# Patient Record
Sex: MALE | Race: WHITE | NOT HISPANIC OR LATINO | Employment: OTHER | URBAN - METROPOLITAN AREA
[De-identification: names, ages, dates, MRNs, and addresses within clinical notes are randomized per-mention and may not be internally consistent; named-entity substitution may affect disease eponyms.]

---

## 2021-05-14 ENCOUNTER — IMMUNIZATION (OUTPATIENT)
Dept: FAMILY PLANNING/WOMEN'S HEALTH CLINIC | Facility: IMMUNIZATION CENTER | Age: 41
End: 2021-05-14
Payer: MEDICARE

## 2021-05-14 DIAGNOSIS — Z23 ENCOUNTER FOR VACCINATION: Primary | ICD-10-CM

## 2021-05-14 PROCEDURE — 0002A PFIZER SARS-COV-2 VACCINE: CPT

## 2021-05-14 PROCEDURE — 91300 PFIZER SARS-COV-2 VACCINE: CPT

## 2022-03-09 PROCEDURE — RXMED WILLOW AMBULATORY MEDICATION CHARGE

## 2022-03-10 ENCOUNTER — PHARMACY VISIT (OUTPATIENT)
Dept: PHARMACY | Facility: MEDICAL CENTER | Age: 42
End: 2022-03-10
Payer: COMMERCIAL

## 2022-05-18 ENCOUNTER — TELEPHONE (OUTPATIENT)
Dept: SCHEDULING | Facility: IMAGING CENTER | Age: 42
End: 2022-05-18

## 2022-05-18 PROCEDURE — RXMED WILLOW AMBULATORY MEDICATION CHARGE

## 2022-05-19 PROCEDURE — RXMED WILLOW AMBULATORY MEDICATION CHARGE: Performed by: STUDENT IN AN ORGANIZED HEALTH CARE EDUCATION/TRAINING PROGRAM

## 2022-05-20 ENCOUNTER — OFFICE VISIT (OUTPATIENT)
Dept: MEDICAL GROUP | Facility: MEDICAL CENTER | Age: 42
End: 2022-05-20
Payer: COMMERCIAL

## 2022-05-20 ENCOUNTER — HOSPITAL ENCOUNTER (OUTPATIENT)
Dept: LAB | Facility: MEDICAL CENTER | Age: 42
End: 2022-05-20
Attending: INTERNAL MEDICINE
Payer: COMMERCIAL

## 2022-05-20 VITALS
HEART RATE: 94 BPM | SYSTOLIC BLOOD PRESSURE: 122 MMHG | WEIGHT: 203.8 LBS | HEIGHT: 74 IN | TEMPERATURE: 97.3 F | OXYGEN SATURATION: 97 % | DIASTOLIC BLOOD PRESSURE: 84 MMHG | BODY MASS INDEX: 26.15 KG/M2

## 2022-05-20 DIAGNOSIS — R53.82 CHRONIC FATIGUE: ICD-10-CM

## 2022-05-20 DIAGNOSIS — G47.33 OBSTRUCTIVE SLEEP APNEA: ICD-10-CM

## 2022-05-20 DIAGNOSIS — K64.8 OTHER HEMORRHOIDS: ICD-10-CM

## 2022-05-20 DIAGNOSIS — Z12.83 SKIN CANCER SCREENING: ICD-10-CM

## 2022-05-20 DIAGNOSIS — M26.04 MANDIBULAR HYPOPLASIA: ICD-10-CM

## 2022-05-20 DIAGNOSIS — K60.2 ANAL FISSURE: ICD-10-CM

## 2022-05-20 DIAGNOSIS — G47.10 HYPERSOMNOLENCE: ICD-10-CM

## 2022-05-20 DIAGNOSIS — K58.0 IRRITABLE BOWEL SYNDROME WITH DIARRHEA: ICD-10-CM

## 2022-05-20 DIAGNOSIS — L98.9 SKIN LESION OF FACE: ICD-10-CM

## 2022-05-20 DIAGNOSIS — K62.89 ANAL PAIN: ICD-10-CM

## 2022-05-20 DIAGNOSIS — Z00.00 WELL ADULT EXAM: ICD-10-CM

## 2022-05-20 DIAGNOSIS — R79.89 LOW TESTOSTERONE: ICD-10-CM

## 2022-05-20 DIAGNOSIS — J45.40 MODERATE PERSISTENT ASTHMA WITHOUT COMPLICATION: ICD-10-CM

## 2022-05-20 DIAGNOSIS — J30.89 NON-SEASONAL ALLERGIC RHINITIS, UNSPECIFIED TRIGGER: ICD-10-CM

## 2022-05-20 DIAGNOSIS — K21.9 GASTROESOPHAGEAL REFLUX DISEASE, UNSPECIFIED WHETHER ESOPHAGITIS PRESENT: ICD-10-CM

## 2022-05-20 PROBLEM — K76.0 FATTY LIVER: Status: ACTIVE | Noted: 2018-11-07

## 2022-05-20 PROBLEM — G43.909 MIGRAINES: Status: ACTIVE | Noted: 2018-11-07

## 2022-05-20 PROBLEM — J45.909 ASTHMA: Status: ACTIVE | Noted: 2018-11-07

## 2022-05-20 PROBLEM — J34.3 HYPERTROPHY, NASAL, TURBINATE: Status: ACTIVE | Noted: 2019-07-03

## 2022-05-20 PROBLEM — R68.84 JAW PAIN: Status: ACTIVE | Noted: 2017-07-18

## 2022-05-20 PROBLEM — K64.9 HEMORRHOIDS: Status: ACTIVE | Noted: 2017-03-15

## 2022-05-20 PROBLEM — M26.02 MAXILLARY HYPOPLASIA: Status: ACTIVE | Noted: 2021-02-10

## 2022-05-20 LAB
ALBUMIN SERPL BCP-MCNC: 4.7 G/DL (ref 3.2–4.9)
ALBUMIN/GLOB SERPL: 1.6 G/DL
ALP SERPL-CCNC: 69 U/L (ref 30–99)
ALT SERPL-CCNC: 23 U/L (ref 2–50)
ANION GAP SERPL CALC-SCNC: 10 MMOL/L (ref 7–16)
AST SERPL-CCNC: 21 U/L (ref 12–45)
BASOPHILS # BLD AUTO: 0.3 % (ref 0–1.8)
BASOPHILS # BLD: 0.02 K/UL (ref 0–0.12)
BILIRUB SERPL-MCNC: 1 MG/DL (ref 0.1–1.5)
BUN SERPL-MCNC: 15 MG/DL (ref 8–22)
CALCIUM SERPL-MCNC: 9.5 MG/DL (ref 8.4–10.2)
CHLORIDE SERPL-SCNC: 104 MMOL/L (ref 96–112)
CHOLEST SERPL-MCNC: 223 MG/DL (ref 100–199)
CO2 SERPL-SCNC: 25 MMOL/L (ref 20–33)
CREAT SERPL-MCNC: 0.89 MG/DL (ref 0.5–1.4)
EOSINOPHIL # BLD AUTO: 0.09 K/UL (ref 0–0.51)
EOSINOPHIL NFR BLD: 1.5 % (ref 0–6.9)
ERYTHROCYTE [DISTWIDTH] IN BLOOD BY AUTOMATED COUNT: 38.2 FL (ref 35.9–50)
FASTING STATUS PATIENT QL REPORTED: NORMAL
FERRITIN SERPL-MCNC: 101 NG/ML (ref 22–322)
GFR SERPLBLD CREATININE-BSD FMLA CKD-EPI: 110 ML/MIN/1.73 M 2
GLOBULIN SER CALC-MCNC: 2.9 G/DL (ref 1.9–3.5)
GLUCOSE SERPL-MCNC: 101 MG/DL (ref 65–99)
HCT VFR BLD AUTO: 46.5 % (ref 42–52)
HDLC SERPL-MCNC: 62 MG/DL
HGB BLD-MCNC: 15.9 G/DL (ref 14–18)
IMM GRANULOCYTES # BLD AUTO: 0.01 K/UL (ref 0–0.11)
IMM GRANULOCYTES NFR BLD AUTO: 0.2 % (ref 0–0.9)
LDLC SERPL CALC-MCNC: 141 MG/DL
LYMPHOCYTES # BLD AUTO: 1.63 K/UL (ref 1–4.8)
LYMPHOCYTES NFR BLD: 26.7 % (ref 22–41)
MCH RBC QN AUTO: 30.8 PG (ref 27–33)
MCHC RBC AUTO-ENTMCNC: 34.2 G/DL (ref 33.7–35.3)
MCV RBC AUTO: 90.1 FL (ref 81.4–97.8)
MONOCYTES # BLD AUTO: 0.68 K/UL (ref 0–0.85)
MONOCYTES NFR BLD AUTO: 11.1 % (ref 0–13.4)
NEUTROPHILS # BLD AUTO: 3.67 K/UL (ref 1.82–7.42)
NEUTROPHILS NFR BLD: 60.2 % (ref 44–72)
NRBC # BLD AUTO: 0 K/UL
NRBC BLD-RTO: 0 /100 WBC
PLATELET # BLD AUTO: 221 K/UL (ref 164–446)
PMV BLD AUTO: 8.7 FL (ref 9–12.9)
POTASSIUM SERPL-SCNC: 4.4 MMOL/L (ref 3.6–5.5)
PROT SERPL-MCNC: 7.6 G/DL (ref 6–8.2)
RBC # BLD AUTO: 5.16 M/UL (ref 4.7–6.1)
SODIUM SERPL-SCNC: 139 MMOL/L (ref 135–145)
T4 FREE SERPL-MCNC: 0.95 NG/DL (ref 0.93–1.7)
TESTOST SERPL-MCNC: 355 NG/DL (ref 175–781)
TRIGL SERPL-MCNC: 99 MG/DL (ref 0–149)
TSH SERPL DL<=0.005 MIU/L-ACNC: 5.85 UIU/ML (ref 0.38–5.33)
VIT B12 SERPL-MCNC: 624 PG/ML (ref 211–911)
WBC # BLD AUTO: 6.1 K/UL (ref 4.8–10.8)

## 2022-05-20 PROCEDURE — 84439 ASSAY OF FREE THYROXINE: CPT

## 2022-05-20 PROCEDURE — 84403 ASSAY OF TOTAL TESTOSTERONE: CPT

## 2022-05-20 PROCEDURE — 82728 ASSAY OF FERRITIN: CPT

## 2022-05-20 PROCEDURE — 82607 VITAMIN B-12: CPT

## 2022-05-20 PROCEDURE — 84443 ASSAY THYROID STIM HORMONE: CPT

## 2022-05-20 PROCEDURE — 99204 OFFICE O/P NEW MOD 45 MIN: CPT | Performed by: INTERNAL MEDICINE

## 2022-05-20 PROCEDURE — 80061 LIPID PANEL: CPT

## 2022-05-20 PROCEDURE — 85025 COMPLETE CBC W/AUTO DIFF WBC: CPT

## 2022-05-20 PROCEDURE — 36415 COLL VENOUS BLD VENIPUNCTURE: CPT

## 2022-05-20 PROCEDURE — 80053 COMPREHEN METABOLIC PANEL: CPT

## 2022-05-20 RX ORDER — FLUTICASONE PROPIONATE 50 MCG
1 SPRAY, SUSPENSION (ML) NASAL DAILY
COMMUNITY
End: 2022-05-20

## 2022-05-20 RX ORDER — DIAZEPAM 2 MG/1
2 TABLET ORAL
COMMUNITY
End: 2022-09-07

## 2022-05-20 RX ORDER — DICYCLOMINE HYDROCHLORIDE 10 MG/1
10 CAPSULE ORAL
COMMUNITY
End: 2023-02-13

## 2022-05-20 RX ORDER — CHOLESTYRAMINE 4 G/9G
POWDER, FOR SUSPENSION ORAL
COMMUNITY
Start: 2022-05-19 | End: 2022-09-07

## 2022-05-20 RX ORDER — FLUTICASONE PROPIONATE 50 MCG
SPRAY, SUSPENSION (ML) NASAL DAILY
COMMUNITY
End: 2022-09-07

## 2022-05-20 RX ORDER — OMEPRAZOLE 20 MG/1
20 CAPSULE, DELAYED RELEASE ORAL DAILY
COMMUNITY
End: 2022-08-17 | Stop reason: SDUPTHER

## 2022-05-20 RX ORDER — ONDANSETRON 4 MG/1
4 TABLET, ORALLY DISINTEGRATING ORAL EVERY 6 HOURS PRN
COMMUNITY

## 2022-05-20 ASSESSMENT — ANXIETY QUESTIONNAIRES
5. BEING SO RESTLESS THAT IT IS HARD TO SIT STILL: SEVERAL DAYS
4. TROUBLE RELAXING: SEVERAL DAYS
2. NOT BEING ABLE TO STOP OR CONTROL WORRYING: SEVERAL DAYS
1. FEELING NERVOUS, ANXIOUS, OR ON EDGE: MORE THAN HALF THE DAYS
GAD7 TOTAL SCORE: 10
3. WORRYING TOO MUCH ABOUT DIFFERENT THINGS: SEVERAL DAYS
6. BECOMING EASILY ANNOYED OR IRRITABLE: NEARLY EVERY DAY
7. FEELING AFRAID AS IF SOMETHING AWFUL MIGHT HAPPEN: SEVERAL DAYS

## 2022-05-20 ASSESSMENT — PATIENT HEALTH QUESTIONNAIRE - PHQ9
SUM OF ALL RESPONSES TO PHQ QUESTIONS 1-9: 18
5. POOR APPETITE OR OVEREATING: 1 - SEVERAL DAYS
CLINICAL INTERPRETATION OF PHQ2 SCORE: 4

## 2022-05-20 NOTE — PROGRESS NOTES
New Patient to Establish      CC: Discussion of multiple medical problems    HPI:   Abel is a 41 y.o. male who came into clinic for above.    His main health issue over the past decade has been chronic fatigue which was thought to be secondary to obstructive sleep apnea.  He tried Pap therapy several times with various masks and did not tolerate it due to insomnia with CPAP.   He finally got corrective surgery with ENT which helped to some degree but unclear benefit persisted. He was planning to have a second surgery when pandemic hits and things were postponed. he continues to need armodafinil 250 mg ( breaking half and takes twice a day) which is the only thing that keeps him awake.  He has been on disability because of chronic fatigue.  His brain feels foggy and he cannot remember unless he writes things down.    He has history of IBS, diarrhea type which has been under control with cholestyramine.  He takes dicyclomine and Valium when it flares.  Lately, he has been stable with lifestyle measure and cholestyramine.  He takes omeprazole and Zofran for GERD and nausea.    He has been having constant anal pain around 8 o'clock, thought to be related to fissure vs hemorrhoid, did not respond to hydrocortisone or lidocaine. He would like to see a specialist. His stools has been soft.    He has skin itching around his eyes, similar to xanthelasma but was told to be unrelated to cholesterol since his cholesterol was normal.  One of the lesion on left eye corner is irritating currently and he would like to get it removed.  She would like to get skin cancer screening for various moles on his body.     He has developed pain in proximal nose.  He tried stopping Flonase to see if it is triggered by Flonase but is not better.  He has chronic rhinitis and asthma.   Would like to see ENT and allergy specialist.    He has history of anxiety and depression, secondary to unresolved medical issues. He would like to optimize  medical problems before considering about treating depression and anxiety separately.  SIERRA-7 Questionnaire  Feeling nervous, anxious, or on edge: More than half the days  Not being able to sop or control worrying: Several days  Worrying too much about different things: Several days  Trouble relaxing: Several days  Being so restless that it's hard to sit still: Several days  Becoming easily annoyed or irritable: Nearly every day  Feeling afraid as if something awful might happen: Several days  Total: 10    Depression Screening  Little interest or pleasure in doing things?  2 - more than half the days   Feeling down, depressed , or hopeless? 2 - more than half the days   Trouble falling or staying asleep, or sleeping too much?  3 - nearly every day   Feeling tired or having little energy?  3 - nearly every day   Poor appetite or overeating?  1 - several days   Feeling bad about yourself - or that you are a failure or have let yourself or your family down? 2 - more than half the days   Trouble concentrating on things, such as reading the newspaper or watching television? 3 - nearly every day   Moving or speaking so slowly that other people could have noticed.  Or the opposite - being so fidgety or restless that you have been moving around a lot more than usual?  2 - more than half the days   Thoughts that you would be better off dead, or of hurting yourself?  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   • Hypertrophy, nasal, turbinate 07/03/2019   • Fatty liver 11/07/2018   • Migraines 11/07/2018   • Asthma 11/07/2018   • Jaw pain 07/18/2017   • Hemorrhoids  03/15/2017   • Gastroesophageal reflux disease 03/14/2017   • Major depressive disorder, recurrent episode (HCC) 08/31/2016   • Arthropathy of lumbar facet joint 07/12/2016   • Chronic low back pain 02/14/2016   • Obstructive sleep apnea 09/09/2015   • Plantar fasciitis, bilateral 08/19/2015   • Anal pain 05/12/2015   • Irritable bowel syndrome 11/17/2011   • Allergic rhinitis 05/24/1996       Past Medical History:   Diagnosis Date   • Arthropathy    • Asthma    • Chronic fatigue    • Chronic pain    • Fatty liver    • GERD (gastroesophageal reflux disease)    • Hypoplasia of mandible    • IBS (irritable bowel syndrome)    • Maxillary hypoplasia        Current Outpatient Medications   Medication Sig Dispense Refill   • dicyclomine (BENTYL) 10 MG Cap Take 10 mg by mouth 4 Times a Day,Before Meals and at Bedtime.     • albuterol (PROVENTIL) 2.5mg/0.5ml Nebu Soln Take 2.5 mg by nebulization every four hours as needed for Shortness of Breath.     • omeprazole (PRILOSEC) 20 MG delayed-release capsule Take 20 mg by mouth every day.     • ondansetron (ZOFRAN ODT) 4 MG TABLET DISPERSIBLE Take 4 mg by mouth every 6 hours as needed for Nausea.     • cholestyramine (QUESTRAN) 4 g packet TAKE 1 SCOOP FULL TWICE A DAY (PT PREFERS CANS NDC 3414-0386-73)     • DICYCLOMINE HCL PO Take 1 Tablet by mouth 1 time a day as needed.     • asa/apap/caffeine (EXCEDRIN) 250-250-65 MG Tab Take 1 Tablet by mouth every 6 hours as needed.     • Cholecalciferol 2000 UNIT Cap Take 2,000 Units by mouth.     • diazePAM (VALIUM) 2 MG Tab Take 2 mg by mouth 1 time a day as needed.     • fluticasone (FLONASE) 50 MCG/ACT nasal spray Administer  into affected nostril(S) every day.     • Albuterol Sulfate 108 (90 Base) MCG/ACT AEROSOL POWDER, BREATH ACTIVATED Inhale 90 mcg.     • Armodafinil 250 MG Tab Take 1 Tablet (250 mg total) by mouth daily 30 Tablet 0   • cholestyramine (QUESTRAN) 4 GM/DOSE powder MIX 1 SCOOP FULL in liquid and drink TWICE A DAY (PT  "PREFERS CANS NDC 8760-7642-30) 7348 g 2   • budesonide-formoterol (SYMBICORT) 160-4.5 MCG/ACT Aerosol Inhale 2 puffs by mouth twice daily 30.6 g 2     No current facility-administered medications for this visit.       Allergies as of 05/20/2022   • (Not on File)       Social History     Socioeconomic History   • Marital status:      Spouse name: Not on file   • Number of children: Not on file   • Years of education: Not on file   • Highest education level: Not on file   Occupational History   • Not on file   Tobacco Use   • Smoking status: Never Smoker   • Smokeless tobacco: Never Used   Vaping Use   • Vaping Use: Never used   Substance and Sexual Activity   • Alcohol use: Yes     Comment: occ   • Drug use: Never   • Sexual activity: Not on file   Other Topics Concern   • Not on file   Social History Narrative   • Not on file     Social Determinants of Health     Financial Resource Strain: Not on file   Food Insecurity: Not on file   Transportation Needs: Not on file   Physical Activity: Not on file   Stress: Not on file   Social Connections: Not on file   Intimate Partner Violence: Not on file   Housing Stability: Not on file       Family History   Problem Relation Age of Onset   • Colorectal Cancer Paternal Uncle         after age 50       History reviewed. No pertinent surgical history.      /84 (BP Location: Right arm, Patient Position: Sitting, BP Cuff Size: Adult)   Pulse 94   Temp 36.3 °C (97.3 °F) (Temporal)   Ht 1.88 m (6' 2\")   Wt 92.4 kg (203 lb 12.8 oz)   SpO2 97%   BMI 26.17 kg/m²     Physical Exam  General: Alert and oriented, No apparent distress.  Throat: Clear no erythema or exudates noted.  Neck: Supple. No cervical or supraclavicular lymphadenopathy noted. Thyroid not enlarged.  Lungs: Clear to auscultation bilaterally without any wheezing, crepitations.  Cardiovascular: Regular rate and rhythm. No murmurs, rubs or gallops.  Abdomen: Bowel sound +, soft, non tender, no rebound " or guarding, no palpable organomegaly.  Rectal exam showed no palpable /visible fissure or hemorrhoid. No palpable mass.  Extremities: No clubbing, cyanosis, edema.         Assessment and Plan    1. Chronic fatigue  ?  Not proportionate to the degree of sleep apnea since his AHI was 7.7 on home sleep study. (home study at 4/24/17  showed AHI of 7.7, RDI of 7.7, JOSE JUAN of 7.5, and lowest oxygen saturation of 87%.)  This could be reevaluated by ENT and sleep medicine.   Check other causes for fatigue.   - TSH WITH REFLEX TO FT4; Future  - VITAMIN B12; Future  - FERRITIN; Future    2. Obstructive sleep apnea  - Referral to ENT  - Referral to Pulmonary and Sleep Medicine    3. Hypersomnolence  -Continue armodafinil. He requested the prescription from previous physician waiting to hear about refill. If not, he will get refill from me until he can see sleep physician.    4. Mandibular hypoplasia  - Referral to ENT    5. Non-seasonal allergic rhinitis, unspecified trigger  - Referral to Allergy    6. Skin lesion of face  7. Skin cancer screening  - Referral to Dermatology    8. Low testosterone  Discussed that his sleep apnea needs to be treated before we can start on testosterone therapy.  - TESTOSTERONE SERUM; Future    9. Gastroesophageal reflux disease, unspecified whether esophagitis present  10. Irritable bowel syndrome with diarrhea  -Continue current medications.  He had sigmoidoscopy but not colonoscopy.    11. Anal pain  12. Anal fissure  - Referral to Colorectal Surgery per request    13. Other hemorrhoids  - Referral to Colorectal Surgery    14. Well adult exam  - CBC WITH DIFFERENTIAL; Future  - Comp Metabolic Panel; Future  - Lipid Profile; Future  - TSH WITH REFLEX TO FT4; Future    15. Moderate persistent asthma without complication  -He is on Symbicort 160 twice daily, albuterol as needed.  He has albuterol nebulizer for flares.    Followup: Based on results.         Signed by: Charu Ardon M.D.

## 2022-05-24 ENCOUNTER — PHARMACY VISIT (OUTPATIENT)
Dept: PHARMACY | Facility: MEDICAL CENTER | Age: 42
End: 2022-05-24
Payer: COMMERCIAL

## 2022-06-03 ENCOUNTER — PHARMACY VISIT (OUTPATIENT)
Dept: PHARMACY | Facility: MEDICAL CENTER | Age: 42
End: 2022-06-03
Payer: COMMERCIAL

## 2022-06-03 ENCOUNTER — OFFICE VISIT (OUTPATIENT)
Dept: MEDICAL GROUP | Facility: MEDICAL CENTER | Age: 42
End: 2022-06-03
Payer: COMMERCIAL

## 2022-06-03 VITALS
DIASTOLIC BLOOD PRESSURE: 78 MMHG | TEMPERATURE: 97.7 F | HEART RATE: 95 BPM | SYSTOLIC BLOOD PRESSURE: 122 MMHG | WEIGHT: 202.8 LBS | HEIGHT: 74 IN | BODY MASS INDEX: 26.03 KG/M2 | OXYGEN SATURATION: 98 %

## 2022-06-03 DIAGNOSIS — E03.8 SUBCLINICAL HYPOTHYROIDISM: ICD-10-CM

## 2022-06-03 DIAGNOSIS — R73.01 IFG (IMPAIRED FASTING GLUCOSE): ICD-10-CM

## 2022-06-03 DIAGNOSIS — E78.00 PURE HYPERCHOLESTEROLEMIA: ICD-10-CM

## 2022-06-03 DIAGNOSIS — R53.82 CHRONIC FATIGUE: ICD-10-CM

## 2022-06-03 PROCEDURE — 99214 OFFICE O/P EST MOD 30 MIN: CPT | Performed by: INTERNAL MEDICINE

## 2022-06-03 PROCEDURE — RXMED WILLOW AMBULATORY MEDICATION CHARGE: Performed by: INTERNAL MEDICINE

## 2022-06-03 RX ORDER — LEVOTHYROXINE SODIUM 0.03 MG/1
25 TABLET ORAL
Qty: 90 TABLET | Refills: 1 | Status: SHIPPED | OUTPATIENT
Start: 2022-06-03 | End: 2022-09-27 | Stop reason: SDUPTHER

## 2022-06-03 ASSESSMENT — FIBROSIS 4 INDEX: FIB4 SCORE: 0.81

## 2022-06-03 NOTE — PROGRESS NOTES
Established Patient    Abel presents today with the following:    CC: Lab review    HPI:   Abel is a 41 y.o. male who came in for above.    He is feeling foggy today because he would normally be asleep in the afternoon with his regular circadian rhythm (awake at night, asleep during the day). He usually sleeps from 11AM to 7 PM (sometimes a few hrs off). He has been this way his whole life and thought to be genetically geared to be functional at nighttime. He had hard time with school when he was young due to this reason.    He is here to review labs. Mild fasting glucose elevation 101, , TSH 5.8, FT4 0.95 were significant.  Testosterone is normal 355.  He was hoping to get testosterone supplementation because he was told that he needed it due to low testosterone in the past.    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   • Hypertrophy, nasal, turbinate 07/03/2019   • Fatty liver 11/07/2018   • Migraines 11/07/2018   • Asthma 11/07/2018   • Jaw pain 07/18/2017   • Hemorrhoids 03/15/2017   • Gastroesophageal reflux disease 03/14/2017   • Major depressive disorder, recurrent episode (HCC) 08/31/2016   • Arthropathy of lumbar facet joint 07/12/2016   • Chronic low back pain 02/14/2016   • Obstructive sleep apnea 09/09/2015   • Plantar fasciitis, bilateral 08/19/2015   • Anal pain 05/12/2015   • Irritable bowel syndrome 11/17/2011   • Allergic rhinitis 05/24/1996       Current Outpatient Medications   Medication Sig Dispense Refill   • levothyroxine (SYNTHROID) 25 MCG Tab Take 1 Tablet by mouth every morning on an empty stomach. 90 Tablet 1   • dicyclomine (BENTYL) 10 MG Cap Take 10 mg by mouth 4 Times a Day,Before Meals  "and at Bedtime.     • albuterol (PROVENTIL) 2.5mg/0.5ml Nebu Soln Take 2.5 mg by nebulization every four hours as needed for Shortness of Breath.     • omeprazole (PRILOSEC) 20 MG delayed-release capsule Take 20 mg by mouth every day.     • ondansetron (ZOFRAN ODT) 4 MG TABLET DISPERSIBLE Take 4 mg by mouth every 6 hours as needed for Nausea.     • DICYCLOMINE HCL PO Take 1 Tablet by mouth 1 time a day as needed.     • asa/apap/caffeine (EXCEDRIN) 250-250-65 MG Tab Take 1 Tablet by mouth every 6 hours as needed.     • Cholecalciferol 2000 UNIT Cap Take 2,000 Units by mouth.     • cholestyramine (QUESTRAN) 4 g packet TAKE 1 SCOOP FULL TWICE A DAY (PT PREFERS CANS NDC 9096-0783-06)     • diazePAM (VALIUM) 2 MG Tab Take 2 mg by mouth 1 time a day as needed.     • fluticasone (FLONASE) 50 MCG/ACT nasal spray Administer  into affected nostril(S) every day.     • Albuterol Sulfate 108 (90 Base) MCG/ACT AEROSOL POWDER, BREATH ACTIVATED Inhale 90 mcg.     • Armodafinil 250 MG Tab Take 1 Tablet (250 mg total) by mouth daily 30 Tablet 0   • cholestyramine (QUESTRAN) 4 GM/DOSE powder MIX 1 SCOOP FULL in liquid and drink TWICE A DAY (PT PREFERS CANS NDC 3677-1508-94) 2268 g 2   • budesonide-formoterol (SYMBICORT) 160-4.5 MCG/ACT Aerosol Inhale 2 puffs by mouth twice daily 30.6 g 2     No current facility-administered medications for this visit.         /78 (BP Location: Left arm, Patient Position: Sitting, BP Cuff Size: Adult)   Pulse 95   Temp 36.5 °C (97.7 °F) (Temporal)   Ht 1.88 m (6' 2\")   Wt 92 kg (202 lb 12.8 oz)   SpO2 98%   BMI 26.04 kg/m²     Physical Exam  General: Alert and oriented, No apparent distress.            Assessment and Plan    1. Chronic fatigue  Discussed to check for adrenal insufficiency since his degree of fatigue is disproportionate to degree of sleep apnea. He take armodafinil at night when he wakes up. Check cortisol after he wakes up.  - CORTISOL; Future  - Sed Rate; Future  - CRP " QUANTITIVE (NON-CARDIAC); Future    2. IFG (impaired fasting glucose)  He plans to do intermittent fasting with keto diet.  - HEMOGLOBIN A1C; Future    3. Pure hypercholesterolemia  He plans to do intermittent fasting with keto diet.  Discussed that keto diet can possibly worsen cholesterol in some people. We will have to wait and see how he responds since treatment. The 10-year ASCVD risk score (Blackwelldavida RENDON Jr., et al., 2013) is: 1.1% appropriate for lifestyle management at this point.  - Comp Metabolic Panel; Future  - Lipid Profile; Future    4. Subclinical hypothyroidism  Try low-dose levothyroxine supplement due to severe fatigue.  - TSH WITH REFLEX TO FT4; Future  - levothyroxine (SYNTHROID) 25 MCG Tab; Take 1 Tablet by mouth every morning on an empty stomach.  Dispense: 90 Tablet; Refill: 1        Return in about 3 months (around 9/3/2022).       Signed by: Charu Ardon M.D.

## 2022-06-13 ENCOUNTER — PATIENT MESSAGE (OUTPATIENT)
Dept: MEDICAL GROUP | Facility: MEDICAL CENTER | Age: 42
End: 2022-06-13
Payer: COMMERCIAL

## 2022-06-13 DIAGNOSIS — R53.82 CHRONIC FATIGUE: ICD-10-CM

## 2022-06-14 ENCOUNTER — PHARMACY VISIT (OUTPATIENT)
Dept: PHARMACY | Facility: MEDICAL CENTER | Age: 42
End: 2022-06-14
Payer: COMMERCIAL

## 2022-06-14 PROCEDURE — RXMED WILLOW AMBULATORY MEDICATION CHARGE: Performed by: ALLERGY & IMMUNOLOGY

## 2022-06-14 RX ORDER — AZELASTINE 1 MG/ML
SPRAY, METERED NASAL
Qty: 30 ML | Refills: 5 | Status: SHIPPED | OUTPATIENT
Start: 2022-06-14 | End: 2022-09-07

## 2022-08-03 PROCEDURE — RXMED WILLOW AMBULATORY MEDICATION CHARGE: Performed by: INTERNAL MEDICINE

## 2022-08-05 ENCOUNTER — PHARMACY VISIT (OUTPATIENT)
Dept: PHARMACY | Facility: MEDICAL CENTER | Age: 42
End: 2022-08-05
Payer: COMMERCIAL

## 2022-08-12 ENCOUNTER — APPOINTMENT (RX ONLY)
Dept: URBAN - METROPOLITAN AREA CLINIC 31 | Facility: CLINIC | Age: 42
Setting detail: DERMATOLOGY
End: 2022-08-12

## 2022-08-12 DIAGNOSIS — Z71.89 OTHER SPECIFIED COUNSELING: ICD-10-CM

## 2022-08-12 DIAGNOSIS — L82.1 OTHER SEBORRHEIC KERATOSIS: ICD-10-CM

## 2022-08-12 DIAGNOSIS — L72.0 EPIDERMAL CYST: ICD-10-CM

## 2022-08-12 DIAGNOSIS — L81.4 OTHER MELANIN HYPERPIGMENTATION: ICD-10-CM

## 2022-08-12 DIAGNOSIS — H02.6 XANTHELASMA OF EYELID: ICD-10-CM

## 2022-08-12 DIAGNOSIS — D22 MELANOCYTIC NEVI: ICD-10-CM

## 2022-08-12 DIAGNOSIS — D18.0 HEMANGIOMA: ICD-10-CM

## 2022-08-12 PROBLEM — D18.01 HEMANGIOMA OF SKIN AND SUBCUTANEOUS TISSUE: Status: ACTIVE | Noted: 2022-08-12

## 2022-08-12 PROBLEM — D23.71 OTHER BENIGN NEOPLASM OF SKIN OF RIGHT LOWER LIMB, INCLUDING HIP: Status: ACTIVE | Noted: 2022-08-12

## 2022-08-12 PROBLEM — H02.60 XANTHELASMA OF UNSPECIFIED EYE, UNSPECIFIED EYELID: Status: ACTIVE | Noted: 2022-08-12

## 2022-08-12 PROBLEM — D22.5 MELANOCYTIC NEVI OF TRUNK: Status: ACTIVE | Noted: 2022-08-12

## 2022-08-12 PROBLEM — H02.61 XANTHELASMA OF RIGHT UPPER EYELID: Status: ACTIVE | Noted: 2022-08-12

## 2022-08-12 PROBLEM — H02.64 XANTHELASMA OF LEFT UPPER EYELID: Status: ACTIVE | Noted: 2022-08-12

## 2022-08-12 PROCEDURE — ? ADDITIONAL NOTES

## 2022-08-12 PROCEDURE — ? COUNSELING

## 2022-08-12 PROCEDURE — 99203 OFFICE O/P NEW LOW 30 MIN: CPT

## 2022-08-12 ASSESSMENT — LOCATION SIMPLE DESCRIPTION DERM
LOCATION SIMPLE: RIGHT SUPERIOR EYELID
LOCATION SIMPLE: LEFT SUPERIOR EYELID
LOCATION SIMPLE: LEFT CHEEK
LOCATION SIMPLE: RIGHT CHEEK
LOCATION SIMPLE: RIGHT UPPER BACK

## 2022-08-12 ASSESSMENT — LOCATION ZONE DERM
LOCATION ZONE: FACE
LOCATION ZONE: TRUNK
LOCATION ZONE: EYELID

## 2022-08-12 ASSESSMENT — LOCATION DETAILED DESCRIPTION DERM
LOCATION DETAILED: LEFT SUPRATARSAL CREASE
LOCATION DETAILED: LEFT SUPERIOR CENTRAL MALAR CHEEK
LOCATION DETAILED: LEFT MEDIAL SUPERIOR EYELID
LOCATION DETAILED: RIGHT SUPERIOR CENTRAL MALAR CHEEK
LOCATION DETAILED: RIGHT INFERIOR UPPER BACK
LOCATION DETAILED: RIGHT SUPERIOR UPPER BACK
LOCATION DETAILED: RIGHT MEDIAL SUPERIOR EYELID
LOCATION DETAILED: RIGHT MID-UPPER BACK

## 2022-08-12 NOTE — PROCEDURE: ADDITIONAL NOTES
Additional Notes: Pt given Dr Madison's info for cosmetic surgical options
Detail Level: Detailed
Render Risk Assessment In Note?: no

## 2022-08-17 ENCOUNTER — OFFICE VISIT (OUTPATIENT)
Dept: SLEEP MEDICINE | Facility: MEDICAL CENTER | Age: 42
End: 2022-08-17
Payer: COMMERCIAL

## 2022-08-17 VITALS
SYSTOLIC BLOOD PRESSURE: 124 MMHG | WEIGHT: 203 LBS | HEART RATE: 91 BPM | HEIGHT: 74 IN | BODY MASS INDEX: 26.05 KG/M2 | RESPIRATION RATE: 16 BRPM | DIASTOLIC BLOOD PRESSURE: 84 MMHG | OXYGEN SATURATION: 96 %

## 2022-08-17 DIAGNOSIS — G47.33 OSA (OBSTRUCTIVE SLEEP APNEA): ICD-10-CM

## 2022-08-17 DIAGNOSIS — K21.9 GASTROESOPHAGEAL REFLUX DISEASE, UNSPECIFIED WHETHER ESOPHAGITIS PRESENT: ICD-10-CM

## 2022-08-17 PROCEDURE — 99203 OFFICE O/P NEW LOW 30 MIN: CPT | Performed by: PREVENTIVE MEDICINE

## 2022-08-17 RX ORDER — OMEPRAZOLE 20 MG/1
20 CAPSULE, DELAYED RELEASE ORAL DAILY
Qty: 90 CAPSULE | Refills: 3 | Status: SHIPPED | OUTPATIENT
Start: 2022-08-17 | End: 2022-09-07

## 2022-08-17 ASSESSMENT — FIBROSIS 4 INDEX: FIB4 SCORE: 0.83

## 2022-08-17 NOTE — PROGRESS NOTES
"CHIEF COMPLIANT: \"I will need another sleep study, because I want to be evaluated for Inspire\"   LAST SS done by [facility]  HISTORY OF PRESENT ILLNESS:  Abel Webster is a 42 y.o.male .He is here to to discuss INSPIRE.  He has a past medical history of chronic fatigue, fatty liver, chronic low back pain, GERD, irritable bowel syndrome, jaw pain, major depressive disorder, mandibular hypoplasia, migraines, asthma, obstructive sleep apnea, maxillary hypoplasia and allergic rhinitis. Patient has been seeing  Dr Salazar with [facility] and he is willing to do the evaluation for Inspire. First home sleep study in 2017 attempted CPAP but could not tolerate it.     Sleep History:  Night #1  Sleep study results: Done by [facility]  TYPE: HST   DATE:7/29/22  Diagnostic NURIA 13.8   Diagnostic  Oxygen Joseph: 80.8 under 89% 6.4 mins       Night#2  Sleep study results: Done by [facility]  TYPE: HST   DATE:7/31/22  Diagnostic NURIA: 11.6   Diagnostic  Oxygen Joseph: 81.7 under 89% for 1 minute     Severe excessive daytime sleepiness, loud snoring, and witnessed apneas for more than 10 years. He does not have a set bedtime because he sleeps whenever he possible can. He describes waking up himself like a zombie, he is unemployed because he can not sleep. He does not smoke tobacco, or marijuana, or drink alcohol. He drinks 2-3 caffeinated beverages a day and describes himself as disabled. He can not sleep on his back due to breathing difficulties. Patient is currently using armodafinil to stay awake. Patient has had two surgeries to correct sleep apnea UPPP and palate expansion.     Significant comorbidities and modifying factors: see HPI  PROBLEM LIST:  Patient Active Problem List    Diagnosis Date Noted    Skin lesion of face 05/20/2022    Low testosterone 05/20/2022    Anal fissure 05/20/2022    Moderate persistent asthma without complication 05/20/2022    Chronic fatigue 02/10/2021    " 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    Hypertrophy, nasal, turbinate 07/03/2019    Fatty liver 11/07/2018    Migraines 11/07/2018    Asthma 11/07/2018    Jaw pain 07/18/2017    Hemorrhoids 03/15/2017    Gastroesophageal reflux disease 03/14/2017    Major depressive disorder, recurrent episode (HCC) 08/31/2016    Arthropathy of lumbar facet joint 07/12/2016    Chronic low back pain 02/14/2016    Obstructive sleep apnea 09/09/2015    Plantar fasciitis, bilateral 08/19/2015    Anal pain 05/12/2015    Irritable bowel syndrome 11/17/2011    Allergic rhinitis 05/24/1996     PAST MEDICAL HISTORY:  Past Medical History:   Diagnosis Date    Arthropathy     Asthma     Chickenpox     Chronic fatigue     Chronic pain     Fatty liver     GERD (gastroesophageal reflux disease)     Hypoplasia of mandible     IBS (irritable bowel syndrome)     Maxillary hypoplasia       PAST SOCIAL HISTORY:  History reviewed. No pertinent surgical history.  PAST FAMILY HISTORY:  Family History   Problem Relation Age of Onset    Colorectal Cancer Paternal Uncle         after age 50     SOCIAL HISTORY:  Social History     Socioeconomic History    Marital status:      Spouse name: Not on file    Number of children: Not on file    Years of education: Not on file    Highest education level: Not on file   Occupational History    Not on file   Tobacco Use    Smoking status: Never    Smokeless tobacco: Never   Vaping Use    Vaping Use: Never used   Substance and Sexual Activity    Alcohol use: Yes     Comment: occ    Drug use: Never    Sexual activity: Not on file   Other Topics Concern    Not on file   Social History Narrative    Not on file     Social Determinants of Health     Financial Resource Strain: Not on file   Food Insecurity: Not on file   Transportation Needs: Not on file   Physical Activity: Not on file   Stress: Not on file   Social Connections: Not on file   Intimate Partner Violence: Not on file    Housing Stability: Not on file     ALLERGIES: Patient has no known allergies.  MEDICATIONS:  Current Outpatient Medications   Medication Sig Dispense Refill    Armodafinil 250 MG Tab Take 1 tablet (250 mg) by mouth every day for 30 days. 30 Tablet 0    levothyroxine (SYNTHROID) 25 MCG Tab Take 1 Tablet by mouth every morning on an empty stomach. 90 Tablet 1    albuterol (PROVENTIL) 2.5mg/0.5ml Nebu Soln Take 2.5 mg by nebulization every four hours as needed for Shortness of Breath.      ondansetron (ZOFRAN ODT) 4 MG TABLET DISPERSIBLE Take 4 mg by mouth every 6 hours as needed for Nausea.      Albuterol Sulfate 108 (90 Base) MCG/ACT AEROSOL POWDER, BREATH ACTIVATED Inhale 90 mcg.      cholestyramine (QUESTRAN) 4 GM/DOSE powder MIX 1 SCOOP FULL in liquid and drink TWICE A DAY (PT PREFERS CANS NDC 1316-6734-37) 2268 g 2    budesonide-formoterol (SYMBICORT) 160-4.5 MCG/ACT Aerosol Inhale 2 puffs by mouth twice daily 30.6 g 2    omeprazole (PRILOSEC) 20 MG delayed-release capsule Take 1 Capsule by mouth every day. 90 Capsule 3    azelastine (ASTELIN) 137 MCG/SPRAY nasal spray AER SPRAY NASAL 1-2 sprays each nostril twice a day as needed (Patient not taking: Reported on 8/17/2022) 30 mL 6    azelastine (ASTELIN) 137 MCG/SPRAY nasal spray Administer 2 sprays in each nostril twice a day as needed for nasal symptoms. Stop if nose bleed occurs (Patient not taking: Reported on 8/17/2022) 30 mL 5    dicyclomine (BENTYL) 10 MG Cap Take 10 mg by mouth 4 Times a Day,Before Meals and at Bedtime.      DICYCLOMINE HCL PO Take 1 Tablet by mouth 1 time a day as needed.      asa/apap/caffeine (EXCEDRIN) 250-250-65 MG Tab Take 1 Tablet by mouth every 6 hours as needed. (Patient not taking: Reported on 8/17/2022)      Cholecalciferol 2000 UNIT Cap Take 2,000 Units by mouth. (Patient not taking: Reported on 8/17/2022)      cholestyramine (QUESTRAN) 4 g packet TAKE 1 SCOOP FULL TWICE A DAY (PT PREFERS CANS NDC 3045-2862-72) (Patient not  "taking: Reported on 8/17/2022)      diazePAM (VALIUM) 2 MG Tab Take 2 mg by mouth 1 time a day as needed. (Patient not taking: Reported on 8/17/2022)      fluticasone (FLONASE) 50 MCG/ACT nasal spray Administer  into affected nostril(S) every day. (Patient not taking: Reported on 8/17/2022)       No current facility-administered medications for this visit.    \"CURRENT RX\"    REVIEW OF SYSTEMS:  Constitutional: Denies weight loss, endorses chronic daytime fatigue  Eyes: Denies vision changes  Ears/Nose/Mouth/Throat: Denies rhinitis/nasal congestion, injury, decayed teeth/toothaches.  Cardiovascular: Denies chest pain, tightness, palpitations, swelling in legs/feet, difficulty breathing when lying down but gets better when sitting up.   Respiratory: Denies shortness of breath while awake,  Sleep: per HPI  Gastrointestinal: Denies  difficulty swallowing,  heartburn.  Genitourinary: REPORTS nocturia  Musculoskeletal: Denies painful joints, sore muscles, back pain.   Neurological: Denies frequent headaches,weakness, dizziness.    PHYSICAL EXAM/VITALS:  /84 (BP Location: Left arm, Patient Position: Sitting, BP Cuff Size: Adult)   Pulse 91   Resp 16   Ht 1.88 m (6' 2\")   Wt 92.1 kg (203 lb)   SpO2 96%   BMI 26.06 kg/m²   Appearance: Well-nourished, well-developed,  looks stated age, no acute distress  Eyes:   EOMI  ENMT: MASKED  Neck: Supple, trachea midline  Respiratory effort:  No intercostal retractions or use of accessory muscles  Musculoskeletal:  Grossly normal; gait and station normal  Neurologic:  oriented to person, time, place, and purpose; judgement intact  Psychiatric:  No depression, anxiety, agitation    MEDICAL DECISION MAKING:  The medical record was reviewed in its as pertains to this referral. This includes records from primary care, consultants notes,  referral request, hospital records, labs, imaging. Any available diagnostic and titration nocturnal polysomnograms, home sleep apnea tests, " continuous nocturnal oximetry results, multiple sleep latency tests, and compliance reports were reviewed with the patient.    ASSESSMENT/PLAN:   Patient is interested in getting to get INSPIRE but HST did not allow for AHI criteria will repeat with in lab polysomnography and attempt to sleep supine. Patient failed CPAP.   1. JAYESH (obstructive sleep apnea)  - Polysomnography, 4 or More; Future  - Referral to ENT  Has been advised to continue the current Pap Therapy.  Also advised to clean equipment frequently, and get new mask and supplies as allowed by insurance and DME.  Patient was advised to NEVER use  OZONE containing cleaning systems such as So Clean.  The risks of untreated sleep apnea were discussed with the patient at length. Patients with JAYESH are at increased risk of cardiovascular disease including coronary artery disease, systemic arterial hypertension, pulmonary arterial hypertension, cardiac arrhythmias, and stroke. JAYESH patients have an increased risk of motor vehicle accidents, type 2 diabetes, chronic kidney disease, and non-alcoholic liver disease. The patient was advised to avoid driving a motor vehicle when drowsy.  Have advised the patient to follow up with the appropriate healthcare practitioners for all other medical problems and issues.    RETURN TO CLINIC: Return for 2 wks after to discuss sleep study with Dr. Cohn.  My total time spent caring for the patient on the day of the encounter was 40minutes. This includes time time spent on a thorough chart review including other physician notes, any type of sleep study, as well as critical labs and pulmonary and cardiac studies.  Additionally it includes discussions of good sleep hygiene, stimulus control and going over the need for consistency in terms of sleep preparation and practice.     Please note that this dictation was created using voice recognition software.  I have made every reasonable attempt to correct obvious errors, I expect that  there are errors of grammar and possibly content that I did not discover before finalizing this note.

## 2022-08-18 PROCEDURE — RXMED WILLOW AMBULATORY MEDICATION CHARGE: Performed by: INTERNAL MEDICINE

## 2022-08-19 ENCOUNTER — PHARMACY VISIT (OUTPATIENT)
Dept: PHARMACY | Facility: MEDICAL CENTER | Age: 42
End: 2022-08-19
Payer: COMMERCIAL

## 2022-08-29 PROCEDURE — RXMED WILLOW AMBULATORY MEDICATION CHARGE: Performed by: INTERNAL MEDICINE

## 2022-08-31 ENCOUNTER — PHARMACY VISIT (OUTPATIENT)
Dept: PHARMACY | Facility: MEDICAL CENTER | Age: 42
End: 2022-08-31
Payer: COMMERCIAL

## 2022-08-31 ENCOUNTER — SLEEP STUDY (OUTPATIENT)
Dept: SLEEP MEDICINE | Facility: MEDICAL CENTER | Age: 42
End: 2022-08-31
Payer: COMMERCIAL

## 2022-08-31 DIAGNOSIS — G47.33 OSA (OBSTRUCTIVE SLEEP APNEA): ICD-10-CM

## 2022-08-31 PROCEDURE — 95810 POLYSOM 6/> YRS 4/> PARAM: CPT | Performed by: INTERNAL MEDICINE

## 2022-08-31 PROCEDURE — RXMED WILLOW AMBULATORY MEDICATION CHARGE: Performed by: INTERNAL MEDICINE

## 2022-08-31 RX ORDER — BUDESONIDE AND FORMOTEROL FUMARATE DIHYDRATE 160; 4.5 UG/1; UG/1
AEROSOL RESPIRATORY (INHALATION)
Qty: 30.6 G | Refills: 2 | Status: CANCELLED | OUTPATIENT
Start: 2022-08-29

## 2022-09-02 ENCOUNTER — PHARMACY VISIT (OUTPATIENT)
Dept: PHARMACY | Facility: MEDICAL CENTER | Age: 42
End: 2022-09-02
Payer: COMMERCIAL

## 2022-09-06 ENCOUNTER — HOSPITAL ENCOUNTER (OUTPATIENT)
Facility: MEDICAL CENTER | Age: 42
End: 2022-09-06
Attending: INTERNAL MEDICINE
Payer: COMMERCIAL

## 2022-09-06 DIAGNOSIS — R53.82 CHRONIC FATIGUE: ICD-10-CM

## 2022-09-06 PROCEDURE — 82533 TOTAL CORTISOL: CPT

## 2022-09-06 NOTE — PROCEDURES
MONTAGE: Standard    STUDY TYPE: Diagnostic    RECORDING TECHNIQUE:   After the scalp was prepared, gold plated electrodes were applied to the scalp according to the International 10-20 System. EEG (electroencephalogram) was continuously monitored from the O1-M2, O2-M1, C3-M2, C4-M1, F3-M2, and F4-M1. EOGs (electrooculograms) were monitored by electrodes placed at the left and right outer canthi. Chin EMG (electromyogram) was monitored by electrodes placed on the mentalis and sub-mentalis muscles. Nasal and oral airflow were monitored using a triple port thermocouple as well as oronasal pressure transducer. Respiratory effort was measured by inductive plethysmography technology employing abdominal and thoracic belts. Blood oxygen saturation and pulse were monitored by pulse oximetry. Heart rhythm was monitored by surface electrocardiogram. Leg EMG was studied using surface electrodes placed on left and right anterior tibialis. A microphone was used to monitor tracheal sounds and snoring. Body position was monitored and documented by technician observation.     SCORING CRITERIA:   A modification of the AASM manual for scoring of sleep and associated events was used. Obstructive apneas were scored by cessation of airflow for at least 10 seconds with continuing respiratory effort. Central apneas were scored by cessation of airflow for at least 10 seconds with no respiratory effort. Hypopneas were scored by a 30% or more reduction in airflow for at least 10 seconds accompanied by arterial oxygen desaturation of 3% or an arousal. For CMS (Medicare) patients, per AASM rule 1B, hypopneas are scored by 30% with mild reduction in airflow for at least 10 seconds accompanied by arterial saturation decreased at 4%.    Study start time was 10:24:30 PM. Diagnostic recording time was 7h 22.0m with a total sleep time of 6h 34.5m resulting in a sleep efficiency of 89.25%%. Sleep latency from the start of the study was 12 minutes and  the latency from sleep to REM was 66 minutes. In total,136 arousals were scored for an arousal index of 20.7.  Respiratory:  There were a total of 11 apneas consisting of 0 obstructive apneas, 0 mixed apneas, and 11 central apneas. A total of 101 hypopneas were scored. The apnea index was 1.67 per hour and the hypopnea index was 15.36 per hour resulting in an overall AHI of 17.03. AHI during rem was 19.8 and AHI while supine was 17.03.  Oximetry:  There was a mean oxygen saturation of 91.0%. The minimum oxygen saturation in NREM was 81.0 % and in REM was 84.0. The patient spent 47.9 minutes of TST with SaO2 <88%.  Cardiac:  The highest heart rate seen while awake was 98 BPM while the highest heart rate during sleep was 100 BPM with an average sleeping heart rate of 66 BPM.  Limb Movements:  There were a total of 10 PLMs during sleep which resulted in a PLMS index of 1.5. Of these, 19 were associated with arousals which resulted in a PLMS arousal index of 2.9.      ASSESSMENT:    Moderate obstructive sleep apnea hypopnea - AHI 17.0  Persistent nocturnal desaturation - gin saturation 81% - saturations less than or equal to 88% for 12.1% of the TST  Failure to meet the split-night protocol secondary to an insufficient number of qualifying respiratory events before 2 AM      RECOMMENDATION:    Recommend a Pap titration.  Clinical correlation is needed.  An empiric trial of auto titrating CPAP may be an acceptable option.        Dr. Dillon Esquivel MD

## 2022-09-07 ENCOUNTER — OFFICE VISIT (OUTPATIENT)
Dept: MEDICAL GROUP | Facility: MEDICAL CENTER | Age: 42
End: 2022-09-07
Payer: COMMERCIAL

## 2022-09-07 VITALS
SYSTOLIC BLOOD PRESSURE: 118 MMHG | HEIGHT: 74 IN | OXYGEN SATURATION: 97 % | TEMPERATURE: 97.5 F | HEART RATE: 111 BPM | DIASTOLIC BLOOD PRESSURE: 74 MMHG | BODY MASS INDEX: 25.49 KG/M2 | WEIGHT: 198.6 LBS

## 2022-09-07 DIAGNOSIS — E78.00 PURE HYPERCHOLESTEROLEMIA: ICD-10-CM

## 2022-09-07 DIAGNOSIS — E03.8 SUBCLINICAL HYPOTHYROIDISM: ICD-10-CM

## 2022-09-07 DIAGNOSIS — R73.01 IFG (IMPAIRED FASTING GLUCOSE): ICD-10-CM

## 2022-09-07 DIAGNOSIS — G47.33 OBSTRUCTIVE SLEEP APNEA: ICD-10-CM

## 2022-09-07 DIAGNOSIS — K21.9 GASTROESOPHAGEAL REFLUX DISEASE, UNSPECIFIED WHETHER ESOPHAGITIS PRESENT: ICD-10-CM

## 2022-09-07 DIAGNOSIS — R53.82 CHRONIC FATIGUE: ICD-10-CM

## 2022-09-07 PROCEDURE — 99214 OFFICE O/P EST MOD 30 MIN: CPT | Performed by: INTERNAL MEDICINE

## 2022-09-07 PROCEDURE — RXMED WILLOW AMBULATORY MEDICATION CHARGE: Performed by: INTERNAL MEDICINE

## 2022-09-07 RX ORDER — PANTOPRAZOLE SODIUM 40 MG/1
40 TABLET, DELAYED RELEASE ORAL DAILY
Qty: 90 TABLET | Refills: 3 | Status: SHIPPED | OUTPATIENT
Start: 2022-09-07 | End: 2023-11-09 | Stop reason: SDUPTHER

## 2022-09-07 RX ORDER — ARMODAFINIL 250 MG/1
250 TABLET ORAL DAILY
Qty: 30 TABLET | Refills: 2 | Status: SHIPPED | OUTPATIENT
Start: 2022-09-07 | End: 2023-03-21 | Stop reason: SDUPTHER

## 2022-09-07 ASSESSMENT — FIBROSIS 4 INDEX: FIB4 SCORE: 0.83

## 2022-09-08 ENCOUNTER — PHARMACY VISIT (OUTPATIENT)
Dept: PHARMACY | Facility: MEDICAL CENTER | Age: 42
End: 2022-09-08
Payer: COMMERCIAL

## 2022-09-08 NOTE — PROGRESS NOTES
Established Patient    Abel presents today with the following:    CC: Follow-up for chronic medical problems    HPI:   Abel is a 42 y.o. male who came in for above.    He has seen sleep medicine and repeat his sleep study which showed moderate sleep apnea with AHI of 17.  Sleep physician has recommended him to see ENT for inspire.     His GERD has not been under control.  He continues to take omeprazole.  He was recommended to start alginate complex supplement OTC which is expensive.  He is wondering if there is any other alternative.    He has not taken labs due to appointment date change.  He has been taking low-dose levothyroxine.  He has stopped the keto and is doing intermittent fasting.  He lost a few pounds low 200.  he is anxious because he has never lost below 200.  He is wondering what would be appropriate for further weight loss.      ROS:   As above    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    Hypertrophy, nasal, turbinate 07/03/2019    Fatty liver 11/07/2018    Migraines 11/07/2018    Asthma 11/07/2018    Jaw pain 07/18/2017    Hemorrhoids 03/15/2017    Gastroesophageal reflux disease 03/14/2017    Major depressive disorder, recurrent episode (HCC) 08/31/2016    Arthropathy of lumbar facet joint 07/12/2016    Chronic low back pain 02/14/2016    Obstructive sleep apnea 09/09/2015    Plantar fasciitis, bilateral 08/19/2015    Anal pain 05/12/2015    Irritable bowel syndrome 11/17/2011    Allergic rhinitis 05/24/1996       Current Outpatient Medications   Medication Sig Dispense Refill    pantoprazole (PROTONIX) 40 MG Tablet Delayed Response Take 1 Tablet by mouth every day. 90 Tablet 3    Armodafinil 250 MG Tab Take 250 mg by mouth  "every day for 90 days. 30 Tablet 2    budesonide-formoterol (SYMBICORT) 160-4.5 MCG/ACT Aerosol Inhale 2 puffs by mouth twice daily 30.6 g 11    levothyroxine (SYNTHROID) 25 MCG Tab Take 1 Tablet by mouth every morning on an empty stomach. 90 Tablet 1    dicyclomine (BENTYL) 10 MG Cap Take 10 mg by mouth 4 Times a Day,Before Meals and at Bedtime.      albuterol (PROVENTIL) 2.5mg/0.5ml Nebu Soln Take 2.5 mg by nebulization every four hours as needed for Shortness of Breath.      ondansetron (ZOFRAN ODT) 4 MG TABLET DISPERSIBLE Take 4 mg by mouth every 6 hours as needed for Nausea.      Albuterol Sulfate 108 (90 Base) MCG/ACT AEROSOL POWDER, BREATH ACTIVATED Inhale 90 mcg.      cholestyramine (QUESTRAN) 4 GM/DOSE powder MIX 1 SCOOP FULL in liquid and drink TWICE A DAY (PT PREFERS CANS NDC 6105-7733-75) 2268 g 2     No current facility-administered medications for this visit.         /74   Pulse (!) 111   Temp 36.4 °C (97.5 °F) (Temporal)   Ht 1.88 m (6' 2\")   Wt 90.1 kg (198 lb 9.6 oz)   SpO2 97%   BMI 25.50 kg/m²     Physical Exam  General: Alert and oriented, No apparent distress.         Assessment and Plan    1. Chronic fatigue  For now, the only thing that is keeping him awake is armodafinil.  Once his sleep apnea is treated, we agreed to stop this medication.  His general goal is to not be on any medication.  - Armodafinil 250 MG Tab; Take 250 mg by mouth every day for 90 days.  Dispense: 30 Tablet; Refill: 2  - CBC WITH DIFFERENTIAL; Future  - VITAMIN D,25 HYDROXY (DEFICIENCY); Future    2. Gastroesophageal reflux disease, unspecified whether esophagitis present  Consider using Tums instead of alginate complex which includes sodium bicarb.  This could be cheaper.   Once he figures out response to Tums, he can start pantoprazole next month for better acid suppression.  - pantoprazole (PROTONIX) 40 MG Tablet Delayed Response; Take 1 Tablet by mouth every day.  Dispense: 90 Tablet; Refill: 3    3. Pure " hypercholesterolemia  - Comp Metabolic Panel; Future  - Lipid Profile; Future    4. Subclinical hypothyroidism  - TSH WITH REFLEX TO FT4; Future    5. IFG (impaired fasting glucose)  - HEMOGLOBIN A1C; Future    6. Obstructive sleep apnea  - Armodafinil 250 MG Tab; Take 250 mg by mouth every day for 90 days.  Dispense: 30 Tablet; Refill: 2        Return in about 4 months (around 1/7/2023).        Signed by: Charu Ardon M.D.

## 2022-09-09 LAB — CORTIS SAL-MCNC: NORMAL UG/DL

## 2022-09-20 ENCOUNTER — HOSPITAL ENCOUNTER (OUTPATIENT)
Dept: LAB | Facility: MEDICAL CENTER | Age: 42
End: 2022-09-20
Attending: INTERNAL MEDICINE
Payer: COMMERCIAL

## 2022-09-26 ENCOUNTER — HOSPITAL ENCOUNTER (OUTPATIENT)
Dept: LAB | Facility: MEDICAL CENTER | Age: 42
End: 2022-09-26
Attending: INTERNAL MEDICINE
Payer: COMMERCIAL

## 2022-09-26 DIAGNOSIS — E03.8 SUBCLINICAL HYPOTHYROIDISM: ICD-10-CM

## 2022-09-26 DIAGNOSIS — R53.82 CHRONIC FATIGUE: ICD-10-CM

## 2022-09-26 DIAGNOSIS — R73.01 IFG (IMPAIRED FASTING GLUCOSE): ICD-10-CM

## 2022-09-26 DIAGNOSIS — E78.00 PURE HYPERCHOLESTEROLEMIA: ICD-10-CM

## 2022-09-26 LAB
ALBUMIN SERPL BCP-MCNC: 4.7 G/DL (ref 3.2–4.9)
ALBUMIN/GLOB SERPL: 1.7 G/DL
ALP SERPL-CCNC: 68 U/L (ref 30–99)
ALT SERPL-CCNC: 32 U/L (ref 2–50)
ANION GAP SERPL CALC-SCNC: 11 MMOL/L (ref 7–16)
AST SERPL-CCNC: 22 U/L (ref 12–45)
BILIRUB SERPL-MCNC: 1.6 MG/DL (ref 0.1–1.5)
BUN SERPL-MCNC: 10 MG/DL (ref 8–22)
CALCIUM SERPL-MCNC: 9.9 MG/DL (ref 8.5–10.5)
CHLORIDE SERPL-SCNC: 102 MMOL/L (ref 96–112)
CHOLEST SERPL-MCNC: 234 MG/DL (ref 100–199)
CO2 SERPL-SCNC: 26 MMOL/L (ref 20–33)
CREAT SERPL-MCNC: 0.93 MG/DL (ref 0.5–1.4)
CRP SERPL HS-MCNC: <0.3 MG/DL (ref 0–0.75)
ERYTHROCYTE [SEDIMENTATION RATE] IN BLOOD BY WESTERGREN METHOD: 7 MM/HOUR (ref 0–20)
FASTING STATUS PATIENT QL REPORTED: NORMAL
GFR SERPLBLD CREATININE-BSD FMLA CKD-EPI: 105 ML/MIN/1.73 M 2
GLOBULIN SER CALC-MCNC: 2.7 G/DL (ref 1.9–3.5)
GLUCOSE SERPL-MCNC: 105 MG/DL (ref 65–99)
HDLC SERPL-MCNC: 52 MG/DL
LDLC SERPL CALC-MCNC: 142 MG/DL
POTASSIUM SERPL-SCNC: 4.6 MMOL/L (ref 3.6–5.5)
PROT SERPL-MCNC: 7.4 G/DL (ref 6–8.2)
SODIUM SERPL-SCNC: 139 MMOL/L (ref 135–145)
TRIGL SERPL-MCNC: 199 MG/DL (ref 0–149)
TSH SERPL DL<=0.005 MIU/L-ACNC: 3.55 UIU/ML (ref 0.38–5.33)

## 2022-09-26 PROCEDURE — 80053 COMPREHEN METABOLIC PANEL: CPT

## 2022-09-26 PROCEDURE — 85652 RBC SED RATE AUTOMATED: CPT

## 2022-09-26 PROCEDURE — 84443 ASSAY THYROID STIM HORMONE: CPT

## 2022-09-26 PROCEDURE — 80061 LIPID PANEL: CPT

## 2022-09-26 PROCEDURE — 86140 C-REACTIVE PROTEIN: CPT

## 2022-09-26 PROCEDURE — 36415 COLL VENOUS BLD VENIPUNCTURE: CPT

## 2022-09-26 PROCEDURE — 83036 HEMOGLOBIN GLYCOSYLATED A1C: CPT

## 2022-09-27 DIAGNOSIS — E03.8 SUBCLINICAL HYPOTHYROIDISM: ICD-10-CM

## 2022-09-27 LAB
EST. AVERAGE GLUCOSE BLD GHB EST-MCNC: 105 MG/DL
HBA1C MFR BLD: 5.3 % (ref 4–5.6)

## 2022-09-27 RX ORDER — LEVOTHYROXINE SODIUM 0.03 MG/1
25 TABLET ORAL
Qty: 90 TABLET | Refills: 1 | Status: SHIPPED | OUTPATIENT
Start: 2022-09-27 | End: 2023-01-11 | Stop reason: SDUPTHER

## 2022-11-02 ENCOUNTER — PATIENT MESSAGE (OUTPATIENT)
Dept: HEALTH INFORMATION MANAGEMENT | Facility: OTHER | Age: 42
End: 2022-11-02

## 2022-11-06 PROCEDURE — RXMED WILLOW AMBULATORY MEDICATION CHARGE: Performed by: INTERNAL MEDICINE

## 2022-11-16 PROCEDURE — RXMED WILLOW AMBULATORY MEDICATION CHARGE: Performed by: INTERNAL MEDICINE

## 2022-11-17 ENCOUNTER — PHARMACY VISIT (OUTPATIENT)
Dept: PHARMACY | Facility: MEDICAL CENTER | Age: 42
End: 2022-11-17
Payer: COMMERCIAL

## 2022-11-17 PROCEDURE — RXMED WILLOW AMBULATORY MEDICATION CHARGE: Performed by: INTERNAL MEDICINE

## 2022-11-17 RX ORDER — CEPHALEXIN 500 MG/1
CAPSULE ORAL
Qty: 20 CAPSULE | Refills: 0 | Status: SHIPPED | OUTPATIENT
Start: 2022-11-17 | End: 2023-01-03 | Stop reason: SDUPTHER

## 2022-11-18 ENCOUNTER — PHARMACY VISIT (OUTPATIENT)
Dept: PHARMACY | Facility: MEDICAL CENTER | Age: 42
End: 2022-11-18
Payer: COMMERCIAL

## 2022-11-29 PROCEDURE — RXMED WILLOW AMBULATORY MEDICATION CHARGE: Performed by: OTOLARYNGOLOGY

## 2022-11-30 ENCOUNTER — PHARMACY VISIT (OUTPATIENT)
Dept: PHARMACY | Facility: MEDICAL CENTER | Age: 42
End: 2022-11-30
Payer: COMMERCIAL

## 2022-12-03 ENCOUNTER — TELEPHONE (OUTPATIENT)
Dept: MEDICAL GROUP | Facility: PHYSICIAN GROUP | Age: 42
End: 2022-12-03
Payer: COMMERCIAL

## 2022-12-03 NOTE — TELEPHONE ENCOUNTER
I received a message from the call center that patient needed a refill of his Norco.  Patient recently underwent septal surgery and had an incident where he hit his nose at home requiring additional medication use.  Patient currently has 6 tablets left.  Patient has tried to reach out to his surgeon but unfortunately they are unable to refill the medication over the weekend.  Patient does have a follow-up appointment with them on 12/6.  I discussed with the patient that unfortunately I cannot refill a controlled substance over the weekend and he needs to follow-up with his surgeon or his PCP.  Informed the patient that if the pain is uncontrolled and he runs out of [place] he will need to be seen in urgent care or the emergency room.

## 2022-12-05 ENCOUNTER — PHARMACY VISIT (OUTPATIENT)
Dept: PHARMACY | Facility: MEDICAL CENTER | Age: 42
End: 2022-12-05
Payer: COMMERCIAL

## 2022-12-05 PROCEDURE — RXMED WILLOW AMBULATORY MEDICATION CHARGE: Performed by: OTOLARYNGOLOGY

## 2022-12-05 RX ORDER — HYDROCODONE BITARTRATE AND ACETAMINOPHEN 5; 325 MG/1; MG/1
TABLET ORAL
Qty: 10 TABLET | Refills: 0 | OUTPATIENT
Start: 2022-12-05 | End: 2023-01-11

## 2022-12-05 RX ORDER — ONDANSETRON 4 MG/1
TABLET, ORALLY DISINTEGRATING ORAL
Qty: 20 TABLET | Refills: 0 | Status: SHIPPED | OUTPATIENT
Start: 2022-12-05 | End: 2023-01-11

## 2023-01-03 ENCOUNTER — PHARMACY VISIT (OUTPATIENT)
Dept: PHARMACY | Facility: MEDICAL CENTER | Age: 43
End: 2023-01-03
Payer: COMMERCIAL

## 2023-01-03 ENCOUNTER — HOSPITAL ENCOUNTER (OUTPATIENT)
Dept: LAB | Facility: MEDICAL CENTER | Age: 43
End: 2023-01-03
Attending: INTERNAL MEDICINE
Payer: COMMERCIAL

## 2023-01-03 DIAGNOSIS — E78.00 PURE HYPERCHOLESTEROLEMIA: ICD-10-CM

## 2023-01-03 DIAGNOSIS — R73.01 IFG (IMPAIRED FASTING GLUCOSE): ICD-10-CM

## 2023-01-03 DIAGNOSIS — R53.82 CHRONIC FATIGUE: ICD-10-CM

## 2023-01-03 DIAGNOSIS — E03.8 SUBCLINICAL HYPOTHYROIDISM: ICD-10-CM

## 2023-01-03 LAB
25(OH)D3 SERPL-MCNC: 14 NG/ML (ref 30–100)
ALBUMIN SERPL BCP-MCNC: 4.8 G/DL (ref 3.2–4.9)
ALBUMIN/GLOB SERPL: 1.6 G/DL
ALP SERPL-CCNC: 76 U/L (ref 30–99)
ALT SERPL-CCNC: 69 U/L (ref 2–50)
ANION GAP SERPL CALC-SCNC: 11 MMOL/L (ref 7–16)
AST SERPL-CCNC: 41 U/L (ref 12–45)
BASOPHILS # BLD AUTO: 0.5 % (ref 0–1.8)
BASOPHILS # BLD: 0.03 K/UL (ref 0–0.12)
BILIRUB SERPL-MCNC: 0.7 MG/DL (ref 0.1–1.5)
BUN SERPL-MCNC: 12 MG/DL (ref 8–22)
CALCIUM ALBUM COR SERPL-MCNC: 9 MG/DL (ref 8.5–10.5)
CALCIUM SERPL-MCNC: 9.6 MG/DL (ref 8.5–10.5)
CHLORIDE SERPL-SCNC: 102 MMOL/L (ref 96–112)
CHOLEST SERPL-MCNC: 246 MG/DL (ref 100–199)
CO2 SERPL-SCNC: 24 MMOL/L (ref 20–33)
CREAT SERPL-MCNC: 0.92 MG/DL (ref 0.5–1.4)
EOSINOPHIL # BLD AUTO: 0.11 K/UL (ref 0–0.51)
EOSINOPHIL NFR BLD: 1.8 % (ref 0–6.9)
ERYTHROCYTE [DISTWIDTH] IN BLOOD BY AUTOMATED COUNT: 41.2 FL (ref 35.9–50)
EST. AVERAGE GLUCOSE BLD GHB EST-MCNC: 108 MG/DL
FASTING STATUS PATIENT QL REPORTED: NORMAL
GFR SERPLBLD CREATININE-BSD FMLA CKD-EPI: 106 ML/MIN/1.73 M 2
GLOBULIN SER CALC-MCNC: 3 G/DL (ref 1.9–3.5)
GLUCOSE SERPL-MCNC: 86 MG/DL (ref 65–99)
HBA1C MFR BLD: 5.4 % (ref 4–5.6)
HCT VFR BLD AUTO: 45 % (ref 42–52)
HDLC SERPL-MCNC: 47 MG/DL
HGB BLD-MCNC: 15.3 G/DL (ref 14–18)
IMM GRANULOCYTES # BLD AUTO: 0.01 K/UL (ref 0–0.11)
IMM GRANULOCYTES NFR BLD AUTO: 0.2 % (ref 0–0.9)
LDLC SERPL CALC-MCNC: 149 MG/DL
LYMPHOCYTES # BLD AUTO: 2.38 K/UL (ref 1–4.8)
LYMPHOCYTES NFR BLD: 39.3 % (ref 22–41)
MCH RBC QN AUTO: 30.9 PG (ref 27–33)
MCHC RBC AUTO-ENTMCNC: 34 G/DL (ref 33.7–35.3)
MCV RBC AUTO: 90.9 FL (ref 81.4–97.8)
MONOCYTES # BLD AUTO: 0.64 K/UL (ref 0–0.85)
MONOCYTES NFR BLD AUTO: 10.6 % (ref 0–13.4)
NEUTROPHILS # BLD AUTO: 2.88 K/UL (ref 1.82–7.42)
NEUTROPHILS NFR BLD: 47.6 % (ref 44–72)
NRBC # BLD AUTO: 0 K/UL
NRBC BLD-RTO: 0 /100 WBC
PLATELET # BLD AUTO: 205 K/UL (ref 164–446)
PMV BLD AUTO: 9.3 FL (ref 9–12.9)
POTASSIUM SERPL-SCNC: 4.5 MMOL/L (ref 3.6–5.5)
PROT SERPL-MCNC: 7.8 G/DL (ref 6–8.2)
RBC # BLD AUTO: 4.95 M/UL (ref 4.7–6.1)
SODIUM SERPL-SCNC: 137 MMOL/L (ref 135–145)
TRIGL SERPL-MCNC: 250 MG/DL (ref 0–149)
TSH SERPL DL<=0.005 MIU/L-ACNC: 4.46 UIU/ML (ref 0.38–5.33)
WBC # BLD AUTO: 6.1 K/UL (ref 4.8–10.8)

## 2023-01-03 PROCEDURE — 85025 COMPLETE CBC W/AUTO DIFF WBC: CPT

## 2023-01-03 PROCEDURE — 83036 HEMOGLOBIN GLYCOSYLATED A1C: CPT

## 2023-01-03 PROCEDURE — 80061 LIPID PANEL: CPT

## 2023-01-03 PROCEDURE — 84443 ASSAY THYROID STIM HORMONE: CPT

## 2023-01-03 PROCEDURE — 36415 COLL VENOUS BLD VENIPUNCTURE: CPT

## 2023-01-03 PROCEDURE — 82306 VITAMIN D 25 HYDROXY: CPT

## 2023-01-03 PROCEDURE — RXMED WILLOW AMBULATORY MEDICATION CHARGE: Performed by: OTOLARYNGOLOGY

## 2023-01-03 PROCEDURE — 80053 COMPREHEN METABOLIC PANEL: CPT

## 2023-01-03 RX ORDER — CEPHALEXIN 500 MG/1
CAPSULE ORAL
Qty: 21 CAPSULE | Refills: 0 | Status: SHIPPED | OUTPATIENT
Start: 2023-01-03 | End: 2023-02-13

## 2023-01-11 ENCOUNTER — TELEMEDICINE (OUTPATIENT)
Dept: MEDICAL GROUP | Facility: MEDICAL CENTER | Age: 43
End: 2023-01-11
Payer: COMMERCIAL

## 2023-01-11 VITALS — HEIGHT: 74 IN | BODY MASS INDEX: 28.23 KG/M2 | WEIGHT: 220 LBS

## 2023-01-11 DIAGNOSIS — R73.01 IFG (IMPAIRED FASTING GLUCOSE): ICD-10-CM

## 2023-01-11 DIAGNOSIS — G47.33 OBSTRUCTIVE SLEEP APNEA: ICD-10-CM

## 2023-01-11 DIAGNOSIS — K62.89 RECTAL PAIN: ICD-10-CM

## 2023-01-11 DIAGNOSIS — R74.01 ELEVATED ALT MEASUREMENT: ICD-10-CM

## 2023-01-11 DIAGNOSIS — E55.9 VITAMIN D DEFICIENCY: ICD-10-CM

## 2023-01-11 DIAGNOSIS — G47.10 HYPERSOMNOLENCE: ICD-10-CM

## 2023-01-11 DIAGNOSIS — E03.8 SUBCLINICAL HYPOTHYROIDISM: ICD-10-CM

## 2023-01-11 DIAGNOSIS — E78.00 PURE HYPERCHOLESTEROLEMIA: ICD-10-CM

## 2023-01-11 DIAGNOSIS — K21.9 GASTROESOPHAGEAL REFLUX DISEASE, UNSPECIFIED WHETHER ESOPHAGITIS PRESENT: ICD-10-CM

## 2023-01-11 PROCEDURE — RXMED WILLOW AMBULATORY MEDICATION CHARGE: Performed by: INTERNAL MEDICINE

## 2023-01-11 PROCEDURE — 99214 OFFICE O/P EST MOD 30 MIN: CPT | Mod: 95 | Performed by: INTERNAL MEDICINE

## 2023-01-11 RX ORDER — LEVOTHYROXINE SODIUM 0.03 MG/1
25 TABLET ORAL
Qty: 90 TABLET | Refills: 1 | Status: SHIPPED | OUTPATIENT
Start: 2023-01-11 | End: 2023-05-30 | Stop reason: SDUPTHER

## 2023-01-11 ASSESSMENT — FIBROSIS 4 INDEX: FIB4 SCORE: 1.01

## 2023-01-11 ASSESSMENT — PATIENT HEALTH QUESTIONNAIRE - PHQ9: CLINICAL INTERPRETATION OF PHQ2 SCORE: 0

## 2023-01-11 NOTE — PROGRESS NOTES
Virtual Visit: Established Patient   This visit was conducted via Zoom using secure and encrypted videoconferencing technology. The patient was in a private location in the state of [place].    The patient's identity was confirmed and verbal consent was obtained for this virtual visit.    Subjective:   CC:   Chief Complaint   Patient presents with    Lab Results    Follow-Up       Abel Webster is a 42 y.o. male presenting for evaluation and management of above:    He got nasal septal surgery 5 weeks ago and recovering well.  He will get Inspire in February. He is planning to go to [facility] in May.  He is wondering about travel vaccines, malaria prophylaxis.    He saw colorectal surgeon for anal/ rectal pain which he is having for a decade.  Years ago, it was initially thought to be related to hemorrhoid or fissure that did not respond to hydrocortisone or lidocaine.  His stools have been soft.  Sigmoidoscopy was said to be normal.  This time, the surgeon thought that it may be related to prostatitis and prescribed antibiotics which did not make any difference. The surgeon he saw retired.         ROS    As above    No Known Allergies    Current medicines (including changes today)  Current Outpatient Medications   Medication Sig Dispense Refill    levothyroxine (SYNTHROID) 25 MCG Tab Take 1 Tablet by mouth every morning on an empty stomach. 90 Tablet 1    cephALEXin (KEFLEX) 500 MG Cap take 1 capsule by mouth three times daily 21 Capsule 0    pantoprazole (PROTONIX) 40 MG Tablet Delayed Response Take 1 Tablet by mouth every day. 90 Tablet 3    Armodafinil 250 MG Tab Take 1 tablet by mouth every day for 90 days. 30 Tablet 2    budesonide-formoterol (SYMBICORT) 160-4.5 MCG/ACT Aerosol Inhale 2 puffs by mouth twice daily 30.6 g 11    dicyclomine (BENTYL) 10 MG Cap Take 10 mg by mouth 4 Times a Day,Before Meals and at Bedtime.      albuterol (PROVENTIL) 2.5mg/0.5ml Nebu Soln Take 2.5 mg by nebulization every four  "hours as needed for Shortness of Breath.      ondansetron (ZOFRAN ODT) 4 MG TABLET DISPERSIBLE Take 4 mg by mouth every 6 hours as needed for Nausea.      Albuterol Sulfate 108 (90 Base) MCG/ACT AEROSOL POWDER, BREATH ACTIVATED Inhale 90 mcg.      cholestyramine (QUESTRAN) 4 GM/DOSE powder MIX 1 SCOOP FULL in liquid and drink TWICE A DAY (PT PREFERS CANS NDC 4906-3512-38) 2268 g 2     No current facility-administered medications for this visit.       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    Hypertrophy, nasal, turbinate 07/03/2019    Fatty liver 11/07/2018    Migraines 11/07/2018    Asthma 11/07/2018    Jaw pain 07/18/2017    Hemorrhoids 03/15/2017    Gastroesophageal reflux disease 03/14/2017    Major depressive disorder, recurrent episode (HCC) 08/31/2016    Arthropathy of lumbar facet joint 07/12/2016    Chronic low back pain 02/14/2016    Obstructive sleep apnea 09/09/2015    Plantar fasciitis, bilateral 08/19/2015    Anal pain 05/12/2015    Irritable bowel syndrome 11/17/2011    Allergic rhinitis 05/24/1996          Objective:   Ht 1.88 m (6' 2\")   Wt 99.8 kg (220 lb)   BMI 28.25 kg/m²     Physical Exam:   Constitutional: Alert, no distress, well-groomed.  Skin: No rashes in visible areas.  Eye: Round. Conjunctiva clear, lids normal. No icterus.   ENMT: Lips pink without lesions, moist mucous membranes. Phonation normal.  Neck: No visible masses   Respiratory: Unlabored respiratory effort, no cough or audible wheeze  Psych: Alert and oriented x3, normal affect and mood.       Assessment and Plan:   The following treatment plan was discussed:     1. Vitamin D deficiency  He has started vitamin D3 2000 IU since he saw the result.  Continue " indefinitely at this lower dose as he has moderate deficiency. We will recheck in 6 months.  - VITAMIN D,25 HYDROXY (DEFICIENCY); Future    2. Subclinical hypothyroidism  Stable on low-dose levothyroxine.  - TSH WITH REFLEX TO FT4; Future  - levothyroxine (SYNTHROID) 25 MCG Tab; Take 1 Tablet by mouth every morning on an empty stomach.  Dispense: 90 Tablet; Refill: 1    3. Elevated ALT measurement  -Likely secondary to fatty liver. He is continuing healthy lifestyle.  He expects treating sleep apnea will help with his metabolic syndrome, weight regulation and fatty liver. We will recheck in 6 months.    4. Pure hypercholesterolemia  The 10-year ASCVD risk score (Lee DK, et al., 2019) is: 2%  Continue healthy lifestyle.  - Comp Metabolic Panel; Future  - Lipid Profile; Future    5. IFG (impaired fasting glucose)  Stable.  - HEMOGLOBIN A1C; Future    6. Gastroesophageal reflux disease, unspecified whether esophagitis present  - on pantoprazole.  He would like to get off in the future when sleep apnea is under better control.    7. Rectal pain  ? Nerve pain or deep pelvic pain presenting as rectal pain as there is no structural explanation so far by previous sigmoidoscopy, MRI pelvis. We don't have pelvic physiatry in [place]. I'll try getting him to Dr Osorio for further opinion.  - Referral to Urogynecology    8. Obstructive sleep apnea  9. Hypersomnolence  -He remains on armodafinil until sleep apnea is treated better to be functional during the day. His last dispense was more than a month ago.  He has been getting by with extra pills he has from skipping a day here and there.      Follow-up: Return in about 6 months (around 7/11/2023).

## 2023-01-12 ENCOUNTER — PHARMACY VISIT (OUTPATIENT)
Dept: PHARMACY | Facility: MEDICAL CENTER | Age: 43
End: 2023-01-12
Payer: COMMERCIAL

## 2023-01-13 PROCEDURE — RXMED WILLOW AMBULATORY MEDICATION CHARGE: Performed by: INTERNAL MEDICINE

## 2023-01-17 NOTE — TELEPHONE ENCOUNTER
Received request via: Patient    Was the patient seen in the last year in this department? Yes    Does the patient have an active prescription (recently filled or refills available) for medication(s) requested? No    Does the patient have nursing home Plus and need 100 day supply (blood pressure, diabetes and cholesterol meds only)? Patient does not have SCP

## 2023-01-20 DIAGNOSIS — J45.40 MODERATE PERSISTENT ASTHMA WITHOUT COMPLICATION: ICD-10-CM

## 2023-01-20 PROCEDURE — RXMED WILLOW AMBULATORY MEDICATION CHARGE: Performed by: INTERNAL MEDICINE

## 2023-01-20 RX ORDER — ALBUTEROL SULFATE 90 UG/1
2 AEROSOL, METERED RESPIRATORY (INHALATION) EVERY 4 HOURS PRN
Qty: 8.5 G | Refills: 3 | Status: SHIPPED | OUTPATIENT
Start: 2023-01-20

## 2023-01-24 ENCOUNTER — PHARMACY VISIT (OUTPATIENT)
Dept: PHARMACY | Facility: MEDICAL CENTER | Age: 43
End: 2023-01-24
Payer: COMMERCIAL

## 2023-01-24 PROCEDURE — RXMED WILLOW AMBULATORY MEDICATION CHARGE: Performed by: STUDENT IN AN ORGANIZED HEALTH CARE EDUCATION/TRAINING PROGRAM

## 2023-02-01 PROCEDURE — RXMED WILLOW AMBULATORY MEDICATION CHARGE: Performed by: INTERNAL MEDICINE

## 2023-02-02 ENCOUNTER — PHARMACY VISIT (OUTPATIENT)
Dept: PHARMACY | Facility: MEDICAL CENTER | Age: 43
End: 2023-02-02
Payer: COMMERCIAL

## 2023-02-13 ENCOUNTER — PRE-ADMISSION TESTING (OUTPATIENT)
Dept: ADMISSIONS | Facility: MEDICAL CENTER | Age: 43
End: 2023-02-13
Attending: OTOLARYNGOLOGY
Payer: COMMERCIAL

## 2023-02-13 PROCEDURE — RXMED WILLOW AMBULATORY MEDICATION CHARGE: Performed by: INTERNAL MEDICINE

## 2023-02-13 RX ORDER — ARMODAFINIL 250 MG/1
TABLET ORAL DAILY
COMMUNITY
End: 2023-03-22

## 2023-02-14 ENCOUNTER — PHARMACY VISIT (OUTPATIENT)
Dept: PHARMACY | Facility: MEDICAL CENTER | Age: 43
End: 2023-02-14
Payer: COMMERCIAL

## 2023-02-27 ENCOUNTER — HOSPITAL ENCOUNTER (OUTPATIENT)
Facility: MEDICAL CENTER | Age: 43
End: 2023-02-27
Attending: OTOLARYNGOLOGY | Admitting: OTOLARYNGOLOGY
Payer: COMMERCIAL

## 2023-02-27 VITALS
RESPIRATION RATE: 18 BRPM | SYSTOLIC BLOOD PRESSURE: 135 MMHG | HEART RATE: 91 BPM | OXYGEN SATURATION: 97 % | HEIGHT: 74 IN | TEMPERATURE: 97.3 F | BODY MASS INDEX: 26.77 KG/M2 | WEIGHT: 208.56 LBS | DIASTOLIC BLOOD PRESSURE: 78 MMHG

## 2023-02-27 RX ORDER — OXYMETAZOLINE HYDROCHLORIDE 0.05 G/100ML
2 SPRAY NASAL ONCE
Status: DISCONTINUED | OUTPATIENT
Start: 2023-02-27 | End: 2023-02-27 | Stop reason: HOSPADM

## 2023-02-27 RX ORDER — SODIUM CHLORIDE, SODIUM LACTATE, POTASSIUM CHLORIDE, CALCIUM CHLORIDE 600; 310; 30; 20 MG/100ML; MG/100ML; MG/100ML; MG/100ML
INJECTION, SOLUTION INTRAVENOUS CONTINUOUS
Status: DISCONTINUED | OUTPATIENT
Start: 2023-02-27 | End: 2023-02-27 | Stop reason: HOSPADM

## 2023-02-27 ASSESSMENT — FIBROSIS 4 INDEX: FIB4 SCORE: 1.01

## 2023-03-06 ENCOUNTER — HOSPITAL ENCOUNTER (OUTPATIENT)
Facility: MEDICAL CENTER | Age: 43
End: 2023-03-06
Attending: OTOLARYNGOLOGY | Admitting: OTOLARYNGOLOGY
Payer: COMMERCIAL

## 2023-03-06 ENCOUNTER — ANESTHESIA (OUTPATIENT)
Dept: SURGERY | Facility: MEDICAL CENTER | Age: 43
End: 2023-03-06
Payer: COMMERCIAL

## 2023-03-06 ENCOUNTER — PHARMACY VISIT (OUTPATIENT)
Dept: PHARMACY | Facility: MEDICAL CENTER | Age: 43
End: 2023-03-06
Payer: COMMERCIAL

## 2023-03-06 ENCOUNTER — ANESTHESIA EVENT (OUTPATIENT)
Dept: SURGERY | Facility: MEDICAL CENTER | Age: 43
End: 2023-03-06
Payer: COMMERCIAL

## 2023-03-06 ENCOUNTER — APPOINTMENT (OUTPATIENT)
Dept: RADIOLOGY | Facility: MEDICAL CENTER | Age: 43
End: 2023-03-06
Attending: OTOLARYNGOLOGY
Payer: COMMERCIAL

## 2023-03-06 VITALS
HEIGHT: 74 IN | HEART RATE: 84 BPM | OXYGEN SATURATION: 94 % | BODY MASS INDEX: 26.45 KG/M2 | TEMPERATURE: 97.2 F | SYSTOLIC BLOOD PRESSURE: 104 MMHG | WEIGHT: 206.13 LBS | DIASTOLIC BLOOD PRESSURE: 63 MMHG | RESPIRATION RATE: 18 BRPM

## 2023-03-06 DIAGNOSIS — G89.18 POSTOPERATIVE PAIN: ICD-10-CM

## 2023-03-06 LAB
ALBUMIN SERPL BCP-MCNC: 4.8 G/DL (ref 3.2–4.9)
ALBUMIN/GLOB SERPL: 1.6 G/DL
ALP SERPL-CCNC: 83 U/L (ref 30–99)
ALT SERPL-CCNC: 50 U/L (ref 2–50)
ANION GAP SERPL CALC-SCNC: 11 MMOL/L (ref 7–16)
AST SERPL-CCNC: 29 U/L (ref 12–45)
BILIRUB SERPL-MCNC: 1 MG/DL (ref 0.1–1.5)
BUN SERPL-MCNC: 12 MG/DL (ref 8–22)
CALCIUM ALBUM COR SERPL-MCNC: 9.1 MG/DL (ref 8.5–10.5)
CALCIUM SERPL-MCNC: 9.7 MG/DL (ref 8.5–10.5)
CHLORIDE SERPL-SCNC: 104 MMOL/L (ref 96–112)
CO2 SERPL-SCNC: 22 MMOL/L (ref 20–33)
CREAT SERPL-MCNC: 0.85 MG/DL (ref 0.5–1.4)
GFR SERPLBLD CREATININE-BSD FMLA CKD-EPI: 111 ML/MIN/1.73 M 2
GLOBULIN SER CALC-MCNC: 3 G/DL (ref 1.9–3.5)
GLUCOSE SERPL-MCNC: 109 MG/DL (ref 65–99)
POTASSIUM SERPL-SCNC: 4.3 MMOL/L (ref 3.6–5.5)
PROT SERPL-MCNC: 7.8 G/DL (ref 6–8.2)
SODIUM SERPL-SCNC: 137 MMOL/L (ref 135–145)

## 2023-03-06 PROCEDURE — 85027 COMPLETE CBC AUTOMATED: CPT

## 2023-03-06 PROCEDURE — 71045 X-RAY EXAM CHEST 1 VIEW: CPT

## 2023-03-06 PROCEDURE — 160009 HCHG ANES TIME/MIN: Performed by: OTOLARYNGOLOGY

## 2023-03-06 PROCEDURE — A9270 NON-COVERED ITEM OR SERVICE: HCPCS | Performed by: ANESTHESIOLOGY

## 2023-03-06 PROCEDURE — 160035 HCHG PACU - 1ST 60 MINS PHASE I: Performed by: OTOLARYNGOLOGY

## 2023-03-06 PROCEDURE — 700101 HCHG RX REV CODE 250: Performed by: ANESTHESIOLOGY

## 2023-03-06 PROCEDURE — 700102 HCHG RX REV CODE 250 W/ 637 OVERRIDE(OP): Performed by: OTOLARYNGOLOGY

## 2023-03-06 PROCEDURE — A9270 NON-COVERED ITEM OR SERVICE: HCPCS | Performed by: OTOLARYNGOLOGY

## 2023-03-06 PROCEDURE — 00300 ANES ALL PX INTEG H/N/PTRUNK: CPT | Performed by: ANESTHESIOLOGY

## 2023-03-06 PROCEDURE — 700105 HCHG RX REV CODE 258: Performed by: ANESTHESIOLOGY

## 2023-03-06 PROCEDURE — 160048 HCHG OR STATISTICAL LEVEL 1-5: Performed by: OTOLARYNGOLOGY

## 2023-03-06 PROCEDURE — C1767 GENERATOR, NEURO NON-RECHARG: HCPCS | Performed by: OTOLARYNGOLOGY

## 2023-03-06 PROCEDURE — 36415 COLL VENOUS BLD VENIPUNCTURE: CPT

## 2023-03-06 PROCEDURE — 700101 HCHG RX REV CODE 250: Performed by: OTOLARYNGOLOGY

## 2023-03-06 PROCEDURE — 700111 HCHG RX REV CODE 636 W/ 250 OVERRIDE (IP): Performed by: ANESTHESIOLOGY

## 2023-03-06 PROCEDURE — 160046 HCHG PACU - 1ST 60 MINS PHASE II: Performed by: OTOLARYNGOLOGY

## 2023-03-06 PROCEDURE — C1778 LEAD, NEUROSTIMULATOR: HCPCS | Performed by: OTOLARYNGOLOGY

## 2023-03-06 PROCEDURE — RXMED WILLOW AMBULATORY MEDICATION CHARGE: Performed by: OTOLARYNGOLOGY

## 2023-03-06 PROCEDURE — 160029 HCHG SURGERY MINUTES - 1ST 30 MINS LEVEL 4: Performed by: OTOLARYNGOLOGY

## 2023-03-06 PROCEDURE — 160036 HCHG PACU - EA ADDL 30 MINS PHASE I: Performed by: OTOLARYNGOLOGY

## 2023-03-06 PROCEDURE — 80053 COMPREHEN METABOLIC PANEL: CPT

## 2023-03-06 PROCEDURE — 160041 HCHG SURGERY MINUTES - EA ADDL 1 MIN LEVEL 4: Performed by: OTOLARYNGOLOGY

## 2023-03-06 PROCEDURE — 160002 HCHG RECOVERY MINUTES (STAT): Performed by: OTOLARYNGOLOGY

## 2023-03-06 PROCEDURE — 160025 RECOVERY II MINUTES (STATS): Performed by: OTOLARYNGOLOGY

## 2023-03-06 PROCEDURE — 700102 HCHG RX REV CODE 250 W/ 637 OVERRIDE(OP): Performed by: ANESTHESIOLOGY

## 2023-03-06 DEVICE — IMPLANTABLE PULSE GENERATOR INSPIRE (1EA): Type: IMPLANTABLE DEVICE | Site: CHEST | Status: FUNCTIONAL

## 2023-03-06 DEVICE — IMPLANTABLE STIMULATION LEAD INSPIRE (1EA): Type: IMPLANTABLE DEVICE | Site: CHEST | Status: FUNCTIONAL

## 2023-03-06 DEVICE — IMPLANTABLE RESPIRATORY SENSING LEAD INSPIRE (1EA): Type: IMPLANTABLE DEVICE | Site: CHEST | Status: FUNCTIONAL

## 2023-03-06 RX ORDER — DIPHENHYDRAMINE HYDROCHLORIDE 50 MG/ML
12.5 INJECTION INTRAMUSCULAR; INTRAVENOUS
Status: DISCONTINUED | OUTPATIENT
Start: 2023-03-06 | End: 2023-03-06 | Stop reason: HOSPADM

## 2023-03-06 RX ORDER — LIDOCAINE HYDROCHLORIDE 20 MG/ML
INJECTION, SOLUTION EPIDURAL; INFILTRATION; INTRACAUDAL; PERINEURAL PRN
Status: DISCONTINUED | OUTPATIENT
Start: 2023-03-06 | End: 2023-03-06 | Stop reason: SURG

## 2023-03-06 RX ORDER — CEFAZOLIN SODIUM 1 G/3ML
INJECTION, POWDER, FOR SOLUTION INTRAMUSCULAR; INTRAVENOUS PRN
Status: DISCONTINUED | OUTPATIENT
Start: 2023-03-06 | End: 2023-03-06 | Stop reason: SURG

## 2023-03-06 RX ORDER — IPRATROPIUM BROMIDE AND ALBUTEROL SULFATE 2.5; .5 MG/3ML; MG/3ML
3 SOLUTION RESPIRATORY (INHALATION)
Status: DISCONTINUED | OUTPATIENT
Start: 2023-03-06 | End: 2023-03-06 | Stop reason: HOSPADM

## 2023-03-06 RX ORDER — DEXAMETHASONE SODIUM PHOSPHATE 4 MG/ML
INJECTION, SOLUTION INTRA-ARTICULAR; INTRALESIONAL; INTRAMUSCULAR; INTRAVENOUS; SOFT TISSUE PRN
Status: DISCONTINUED | OUTPATIENT
Start: 2023-03-06 | End: 2023-03-06 | Stop reason: SURG

## 2023-03-06 RX ORDER — EPINEPHRINE 1 MG/ML(1)
AMPUL (ML) INJECTION
Status: DISCONTINUED
Start: 2023-03-06 | End: 2023-03-06 | Stop reason: HOSPADM

## 2023-03-06 RX ORDER — ONDANSETRON 2 MG/ML
4 INJECTION INTRAMUSCULAR; INTRAVENOUS ONCE
Status: COMPLETED | OUTPATIENT
Start: 2023-03-06 | End: 2023-03-06

## 2023-03-06 RX ORDER — KETOROLAC TROMETHAMINE 30 MG/ML
INJECTION, SOLUTION INTRAMUSCULAR; INTRAVENOUS PRN
Status: DISCONTINUED | OUTPATIENT
Start: 2023-03-06 | End: 2023-03-06 | Stop reason: SURG

## 2023-03-06 RX ORDER — LIDOCAINE HYDROCHLORIDE 10 MG/ML
INJECTION, SOLUTION EPIDURAL; INFILTRATION; INTRACAUDAL; PERINEURAL
Status: DISCONTINUED
Start: 2023-03-06 | End: 2023-03-06 | Stop reason: HOSPADM

## 2023-03-06 RX ORDER — LIDOCAINE HYDROCHLORIDE AND EPINEPHRINE 10; 10 MG/ML; UG/ML
INJECTION, SOLUTION INFILTRATION; PERINEURAL
Status: DISCONTINUED | OUTPATIENT
Start: 2023-03-06 | End: 2023-03-06 | Stop reason: HOSPADM

## 2023-03-06 RX ORDER — ONDANSETRON 2 MG/ML
INJECTION INTRAMUSCULAR; INTRAVENOUS PRN
Status: DISCONTINUED | OUTPATIENT
Start: 2023-03-06 | End: 2023-03-06 | Stop reason: SURG

## 2023-03-06 RX ORDER — SODIUM CHLORIDE, SODIUM LACTATE, POTASSIUM CHLORIDE, CALCIUM CHLORIDE 600; 310; 30; 20 MG/100ML; MG/100ML; MG/100ML; MG/100ML
INJECTION, SOLUTION INTRAVENOUS
Status: DISCONTINUED | OUTPATIENT
Start: 2023-03-06 | End: 2023-03-06 | Stop reason: SURG

## 2023-03-06 RX ORDER — OXYCODONE HCL 5 MG/5 ML
10 SOLUTION, ORAL ORAL
Status: COMPLETED | OUTPATIENT
Start: 2023-03-06 | End: 2023-03-06

## 2023-03-06 RX ORDER — HYDROMORPHONE HYDROCHLORIDE 1 MG/ML
0.1 INJECTION, SOLUTION INTRAMUSCULAR; INTRAVENOUS; SUBCUTANEOUS
Status: DISCONTINUED | OUTPATIENT
Start: 2023-03-06 | End: 2023-03-06 | Stop reason: HOSPADM

## 2023-03-06 RX ORDER — SODIUM CHLORIDE, SODIUM LACTATE, POTASSIUM CHLORIDE, CALCIUM CHLORIDE 600; 310; 30; 20 MG/100ML; MG/100ML; MG/100ML; MG/100ML
INJECTION, SOLUTION INTRAVENOUS CONTINUOUS
Status: DISCONTINUED | OUTPATIENT
Start: 2023-03-06 | End: 2023-03-06 | Stop reason: HOSPADM

## 2023-03-06 RX ORDER — MEPERIDINE HYDROCHLORIDE 25 MG/ML
25 INJECTION INTRAMUSCULAR; INTRAVENOUS; SUBCUTANEOUS
Status: DISCONTINUED | OUTPATIENT
Start: 2023-03-06 | End: 2023-03-06 | Stop reason: HOSPADM

## 2023-03-06 RX ORDER — BACITRACIN ZINC 500 [USP'U]/G
OINTMENT TOPICAL
Status: DISCONTINUED | OUTPATIENT
Start: 2023-03-06 | End: 2023-03-06 | Stop reason: HOSPADM

## 2023-03-06 RX ORDER — HYDROMORPHONE HYDROCHLORIDE 1 MG/ML
0.5 INJECTION, SOLUTION INTRAMUSCULAR; INTRAVENOUS; SUBCUTANEOUS
Status: DISCONTINUED | OUTPATIENT
Start: 2023-03-06 | End: 2023-03-06 | Stop reason: HOSPADM

## 2023-03-06 RX ORDER — PHENYLEPHRINE HYDROCHLORIDE 10 MG/ML
INJECTION, SOLUTION INTRAMUSCULAR; INTRAVENOUS; SUBCUTANEOUS PRN
Status: DISCONTINUED | OUTPATIENT
Start: 2023-03-06 | End: 2023-03-06 | Stop reason: SURG

## 2023-03-06 RX ORDER — MIDAZOLAM HYDROCHLORIDE 1 MG/ML
1 INJECTION INTRAMUSCULAR; INTRAVENOUS
Status: DISCONTINUED | OUTPATIENT
Start: 2023-03-06 | End: 2023-03-06 | Stop reason: HOSPADM

## 2023-03-06 RX ORDER — OXYCODONE HCL 5 MG/5 ML
5 SOLUTION, ORAL ORAL
Status: COMPLETED | OUTPATIENT
Start: 2023-03-06 | End: 2023-03-06

## 2023-03-06 RX ORDER — BACITRACIN ZINC 500 [USP'U]/G
OINTMENT TOPICAL
Status: DISCONTINUED
Start: 2023-03-06 | End: 2023-03-06 | Stop reason: HOSPADM

## 2023-03-06 RX ORDER — HYDROMORPHONE HYDROCHLORIDE 1 MG/ML
0.2 INJECTION, SOLUTION INTRAMUSCULAR; INTRAVENOUS; SUBCUTANEOUS
Status: DISCONTINUED | OUTPATIENT
Start: 2023-03-06 | End: 2023-03-06 | Stop reason: HOSPADM

## 2023-03-06 RX ORDER — HYDROCODONE BITARTRATE AND ACETAMINOPHEN 7.5; 325 MG/1; MG/1
.5-1 TABLET ORAL EVERY 4 HOURS PRN
Qty: 20 TABLET | Refills: 0 | Status: SHIPPED | OUTPATIENT
Start: 2023-03-06 | End: 2023-03-11

## 2023-03-06 RX ORDER — ONDANSETRON 4 MG/1
4 TABLET, ORALLY DISINTEGRATING ORAL EVERY 6 HOURS PRN
Qty: 10 TABLET | Refills: 0 | Status: SHIPPED | OUTPATIENT
Start: 2023-03-06 | End: 2023-03-21 | Stop reason: SDUPTHER

## 2023-03-06 RX ADMIN — PHENYLEPHRINE HYDROCHLORIDE 100 MCG: 10 INJECTION INTRAVENOUS at 14:30

## 2023-03-06 RX ADMIN — HYDROMORPHONE HYDROCHLORIDE 0.5 MG: 1 INJECTION, SOLUTION INTRAMUSCULAR; INTRAVENOUS; SUBCUTANEOUS at 16:31

## 2023-03-06 RX ADMIN — PHENYLEPHRINE HYDROCHLORIDE 100 MCG: 10 INJECTION INTRAVENOUS at 14:45

## 2023-03-06 RX ADMIN — PHENYLEPHRINE HYDROCHLORIDE 100 MCG: 10 INJECTION INTRAVENOUS at 13:50

## 2023-03-06 RX ADMIN — ONDANSETRON 4 MG: 2 INJECTION INTRAMUSCULAR; INTRAVENOUS at 13:18

## 2023-03-06 RX ADMIN — PHENYLEPHRINE HYDROCHLORIDE 100 MCG: 10 INJECTION INTRAVENOUS at 14:40

## 2023-03-06 RX ADMIN — OXYCODONE HYDROCHLORIDE 10 MG: 5 SOLUTION ORAL at 15:35

## 2023-03-06 RX ADMIN — PHENYLEPHRINE HYDROCHLORIDE 100 MCG: 10 INJECTION INTRAVENOUS at 14:50

## 2023-03-06 RX ADMIN — FENTANYL CITRATE 50 MCG: 50 INJECTION, SOLUTION INTRAMUSCULAR; INTRAVENOUS at 13:56

## 2023-03-06 RX ADMIN — FENTANYL CITRATE 50 MCG: 50 INJECTION, SOLUTION INTRAMUSCULAR; INTRAVENOUS at 16:06

## 2023-03-06 RX ADMIN — MIDAZOLAM 2 MG: 1 INJECTION INTRAMUSCULAR; INTRAVENOUS at 13:00

## 2023-03-06 RX ADMIN — CEFAZOLIN 2 G: 330 INJECTION, POWDER, FOR SOLUTION INTRAMUSCULAR; INTRAVENOUS at 13:00

## 2023-03-06 RX ADMIN — DEXAMETHASONE SODIUM PHOSPHATE 8 MG: 4 INJECTION, SOLUTION INTRA-ARTICULAR; INTRALESIONAL; INTRAMUSCULAR; INTRAVENOUS; SOFT TISSUE at 13:18

## 2023-03-06 RX ADMIN — PHENYLEPHRINE HYDROCHLORIDE 100 MCG: 10 INJECTION INTRAVENOUS at 14:20

## 2023-03-06 RX ADMIN — KETOROLAC TROMETHAMINE 30 MG: 30 INJECTION, SOLUTION INTRAMUSCULAR at 13:18

## 2023-03-06 RX ADMIN — PHENYLEPHRINE HYDROCHLORIDE 100 MCG: 10 INJECTION INTRAVENOUS at 14:00

## 2023-03-06 RX ADMIN — PHENYLEPHRINE HYDROCHLORIDE 100 MCG: 10 INJECTION INTRAVENOUS at 14:34

## 2023-03-06 RX ADMIN — SUGAMMADEX 200 MG: 100 INJECTION, SOLUTION INTRAVENOUS at 14:50

## 2023-03-06 RX ADMIN — PHENYLEPHRINE HYDROCHLORIDE 100 MCG: 10 INJECTION INTRAVENOUS at 13:40

## 2023-03-06 RX ADMIN — ROCURONIUM BROMIDE 40 MG: 10 INJECTION, SOLUTION INTRAVENOUS at 13:05

## 2023-03-06 RX ADMIN — FENTANYL CITRATE 50 MCG: 50 INJECTION, SOLUTION INTRAMUSCULAR; INTRAVENOUS at 14:44

## 2023-03-06 RX ADMIN — PROPOFOL 200 MG: 10 INJECTION, EMULSION INTRAVENOUS at 13:05

## 2023-03-06 RX ADMIN — FENTANYL CITRATE 100 MCG: 50 INJECTION, SOLUTION INTRAMUSCULAR; INTRAVENOUS at 13:05

## 2023-03-06 RX ADMIN — PHENYLEPHRINE HYDROCHLORIDE 100 MCG: 10 INJECTION INTRAVENOUS at 13:31

## 2023-03-06 RX ADMIN — SODIUM CHLORIDE, POTASSIUM CHLORIDE, SODIUM LACTATE AND CALCIUM CHLORIDE: 600; 310; 30; 20 INJECTION, SOLUTION INTRAVENOUS at 13:00

## 2023-03-06 RX ADMIN — ONDANSETRON 4 MG: 2 INJECTION INTRAMUSCULAR; INTRAVENOUS at 15:33

## 2023-03-06 RX ADMIN — LIDOCAINE HYDROCHLORIDE 60 MG: 20 INJECTION, SOLUTION EPIDURAL; INFILTRATION; INTRACAUDAL at 13:05

## 2023-03-06 RX ADMIN — PHENYLEPHRINE HYDROCHLORIDE 100 MCG: 10 INJECTION INTRAVENOUS at 13:25

## 2023-03-06 RX ADMIN — PHENYLEPHRINE HYDROCHLORIDE 100 MCG: 10 INJECTION INTRAVENOUS at 14:10

## 2023-03-06 RX ADMIN — FENTANYL CITRATE 25 MCG: 50 INJECTION, SOLUTION INTRAMUSCULAR; INTRAVENOUS at 15:33

## 2023-03-06 ASSESSMENT — FIBROSIS 4 INDEX: FIB4 SCORE: 1.01

## 2023-03-06 ASSESSMENT — PAIN SCALES - GENERAL: PAIN_LEVEL: 3

## 2023-03-06 NOTE — ANESTHESIA POSTPROCEDURE EVALUATION
Patient: Abel Webster    Procedure Summary     Date: 03/06/23 Room / Location: [facility] ROOM 28 / SURGERY SAME DAY [facility]    Anesthesia Start: 1300 Anesthesia Stop: 1508    Procedure: INSERTION OF HYPOGLOSSAL NERVE NEUROSTIMULATOR ELECTRODE AND GENERATOR AND BREATHING SENSOR ELECTRODE RIGHT (Right: Chest) Diagnosis: (OBSTRUCTIVE SLEEP APNEA, BODY MASS INDEX 25)    Surgeons: Pedro Wen M.D. Responsible Provider: Edin Fitzgerald M.D.    Anesthesia Type: general ASA Status: 2          Final Anesthesia Type: general  Last vitals  BP   Blood Pressure: 101/62    Temp   36.2 °C (97.2 °F)    Pulse   95   Resp   18    SpO2   97 %      Anesthesia Post Evaluation    Patient location during evaluation: PACU  Patient participation: complete - patient participated  Level of consciousness: awake and alert  Pain score: 3    Airway patency: patent  Anesthetic complications: no  Cardiovascular status: hemodynamically stable  Respiratory status: acceptable  Hydration status: euvolemic    PONV: none          No notable events documented.

## 2023-03-06 NOTE — OP REPORT
DATE OF OPERATION: 3/6/23     PREOPERATIVE DIAGNOSIS:Obstructive Sleep Apnea    POSTOPERATIVE DIAGNOSIS: Same    PROCEDURE PERFORMED: Right cranial nerve stimulation implant (Inspire)    SURGEON: Pedro Wen M.D.    ANESTHESIA: General endotracheal anesthesia.    INDICATIONS: The patient is a 42 y.o.-year-old male with obstructive sleep apnea refractory to CPAP. They have passed their DISE evaluation.     FINDINGS: Implant stimulated well at the end of the case    SPECIMEN: None    ESTIMATED BLOOD LOSS: 35 mL.    PROCEDURE: Following informed consent, the patient was properly identified, taken to the operating room and placed in supine position where general endotracheal anesthesia was administered. Intravenous antibiotics were administered by the anesthesiologist in correct time interval. Sequential compression devices were employed. The neck and chest were draped and prepared in the normal surgical fashion for this procedure with betadine and ioban dressing.     After two leads were placed in the right tongue and the right floor of mouth, patient was then   marked at the appropriate location for the 2 incisions in the right sub-mentum   and the right chest.      First, the submental area was addressed.  An incision   was made through the skin and platysma.  The anterior border of the   submandibular gland was developed and carried to the mylohyoid.  The digastric   nerve was retracted inferiorly with two 3-0 silks and secured in place with   clamps.  The mylohyoid was retracted anteriorly and the submandibular gland   was retracted posteriorly and the hypoglossal nerve was visualized.  The   fascia overlying it was carefully removed.  C1 was visualized inferiorly and   the vasa vasorum breakpoint was visualized.  Dissection was carried through   this area and a small pocket was created.  It was then stimulated and found to   be with inclusion branches while superior to this were found to be exclusion    branches.  The cuff was then placed around the nerve in this location and   secured with the previously placed 3-0 silks.  Saline was introduced   underneath of the cuff and then to the right neck incision, which was then   packed with a wet 4 x 4.      Attention was then turned to the chest where an   incision was made through the skin just inferior to the second clavicle.    Dissection was carried down to the anterior border of the pectoralis major and   a small pocket was created in this area inferiorly for the processor.  Blunt   dissection was carried through the pectoralis major to the anterior border of   the chest where the external and internal intercostal muscles were visualized.    Small pocket was then created through the external intercostal muscles and   the sensory probe was then placed in the pocket and secured anteriorly with   four 3-0 silks at the anchor site.  It was then further secured at the second   anchor site with four 3-0 silks.  The previously placed lead in the right neck   was then tunneled under the skin through the inferior flap into the chest   incision where it was then hooked up to the battery/processor along with the   sensory lead was then coiled underneath of the processor, which was then placed in   the chest pocket. Testing indicated an appropriate response. The processor was then   further secured with two 2-0 silks. Then the wound was closed with   interrupted 2-0 Vicryl for deep, 3-0 Vicryl for the skin and then a running   2-0 Prolene for the skin.  Attention was then turned back up to the neck where   it too was closed using a 2-0 Vicryl for the platysma, 3-0 Vicryl for the   deep dermal and then a running 5-0 subcuticular Monocryl for the skin with   overlying bacitracin ointment placed on both incisions.  At this point, my   portion of the procedure was terminated and the patient was turned back over   to anesthesia for emergence.    The patient tolerated the  procedure well and there were no apparent complication. All sponge, needle, and instrument counts were correct on 2 separate occasions. He  was awakened, extubated, and transferred to the recovery room in satisfactory condition.           ____________________________________   Pedro Wen M.D.    DD: 12/6/2021  2:28 PM

## 2023-03-06 NOTE — OR NURSING
1456 Pt arrived from OR, anesthesia and RN gave report. Pt resting comfortably, VSS, no oral airway in place.     1529 Report given to Rhiannon PELAYO

## 2023-03-06 NOTE — DISCHARGE INSTRUCTIONS
Special instructions:   -Keep incision dry for 48 hours, then may get wet but do not soak.   -Clean incisions twice a day with gentle soap and keep covered with ointment such as bacitracin neomycin or Vaseline    If any questions arise, call your provider.  If your provider is not available, please feel free to call the Surgical Center at [phone].    MEDICATIONS: Resume taking daily medication.  Take prescribed pain medication with food.  If no medication is prescribed, you may take non-aspirin pain medication if needed.  PAIN MEDICATION CAN BE VERY CONSTIPATING.  Take a stool softener or laxative such as senokot, pericolace, or milk of magnesia if needed.    Last pain medication given at Oxycodone at 3:30 pm, next dose after 7:30 pm    What to Expect Post Anesthesia    Rest and take it easy for the first 24 hours.  A responsible adult is recommended to remain with you during that time.  It is normal to feel sleepy.  We encourage you to not do anything that requires balance, judgment or coordination.    FOR 24 HOURS DO NOT:  Drive, operate machinery or run household appliances.  Drink beer or alcoholic beverages.  Make important decisions or sign legal documents.    To avoid nausea, slowly advance diet as tolerated, avoiding spicy or greasy foods for the first day.  Add more substantial food to your diet according to your provider's instructions.  Babies can be fed formula or breast milk as soon as they are hungry.  INCREASE FLUIDS AND FIBER TO AVOID CONSTIPATION.    MILD FLU-LIKE SYMPTOMS ARE NORMAL.  YOU MAY EXPERIENCE GENERALIZED MUSCLE ACHES, THROAT IRRITATION, HEADACHE AND/OR SOME NAUSEA.

## 2023-03-06 NOTE — ANESTHESIA TIME REPORT
Anesthesia Start and Stop Event Times     Date Time Event    3/6/2023 1217 Ready for Procedure     1300 Anesthesia Start     1508 Anesthesia Stop        Responsible Staff  03/06/23    Name Role Begin End    Edin Fitzgerald M.D. Anesth 1300 1508        Overtime Reason:  no overtime (within assigned shift)    Comments:

## 2023-03-06 NOTE — ANESTHESIA PROCEDURE NOTES
Airway    Date/Time: 3/6/2023 1:06 PM  Performed by: Edin Fitzgerald M.D.  Authorized by: Edin Fitzgerald M.D.     Location:  OR  Urgency:  Elective  Indications for Airway Management:  Anesthesia      Spontaneous Ventilation: absent    Sedation Level:  Deep  Preoxygenated: Yes    Patient Position:  Sniffing  Final Airway Type:  Endotracheal airway  Final Endotracheal Airway:  ETT  Cuffed: Yes    Technique Used for Successful ETT Placement:  Direct laryngoscopy    Insertion Site:  Oral  Blade Type:  Tor  Laryngoscope Blade/Videolaryngoscope Blade Size:  3  ETT Size (mm):  7.0  Measured from:  Teeth  ETT to Teeth (cm):  21  Placement Verified by: auscultation and capnometry    Cormack-Lehane Classification:  Grade I - full view of glottis  Number of Attempts at Approach:  1

## 2023-03-06 NOTE — OR NURSING
1529 Assumed care    1533 Oxycodone, fentanyl and zofran given for pain and nausea     1550 Handoff to Layne PELAYO

## 2023-03-06 NOTE — ANESTHESIA PREPROCEDURE EVALUATION
Case: [id number] Date/Time: 03/06/23 1400    Procedure: INSERTION OF HYPOGLOSSAL NERVE NEUROSTIMULATOR ELECTRODE AND GENERATOR AND BREATHING SENSOR ELECTRODE RIGHT    Pre-op diagnosis: OBSTRUCTIVE SLEEP APNEA, BODY MASS INDEX 25    Location: [facility] ROOM 28 / SURGERY SAME DAY [facility]    Surgeons: Pedro Wen M.D.          Relevant Problems   ANESTHESIA   (positive) Obstructive sleep apnea      PULMONARY   (positive) Asthma   (positive) Moderate persistent asthma without complication      NEURO   (positive) Migraines      CARDIAC   (positive) Hemorrhoids   (positive) Migraines      GI   (positive) Gastroesophageal reflux disease         (positive) Fatty liver       Physical Exam    Airway   Mallampati: II  TM distance: >3 FB  Neck ROM: full       Cardiovascular - normal exam  Rhythm: regular  Rate: normal  (-) murmur     Dental - normal exam           Pulmonary - normal exam  Breath sounds clear to auscultation     Abdominal    Neurological - normal exam                 Anesthesia Plan    ASA 2       Plan - general       Airway plan will be ETT          Induction: intravenous    Postoperative Plan: Postoperative administration of opioids is intended.    Pertinent diagnostic labs and testing reviewed    Informed Consent:    Anesthetic plan and risks discussed with patient.    Use of blood products discussed with: patient whom consented to blood products.

## 2023-03-07 LAB
ERYTHROCYTE [DISTWIDTH] IN BLOOD BY AUTOMATED COUNT: 42.5 FL (ref 35.9–50)
HCT VFR BLD AUTO: 48.6 % (ref 42–52)
HGB BLD-MCNC: 16.4 G/DL (ref 14–18)
MCH RBC QN AUTO: 30.6 PG (ref 27–33)
MCHC RBC AUTO-ENTMCNC: 33.7 G/DL (ref 33.7–35.3)
MCV RBC AUTO: 90.7 FL (ref 81.4–97.8)
PLATELET # BLD AUTO: 247 K/UL (ref 164–446)
PMV BLD AUTO: 10.1 FL (ref 9–12.9)
RBC # BLD AUTO: 5.36 M/UL (ref 4.7–6.1)
WBC # BLD AUTO: 7.5 K/UL (ref 4.8–10.8)

## 2023-03-07 NOTE — OR NURSING
1550 Report received from Rhiannon PELAYO.     1606 Patient stating he has 8/10 pain in face. Subsequent dose of fentanyl given per MAR.     1630 RN went over discharge instructions with patients wife. All questions answered.     1631 Patient requesting more pain medication for pain in head. 0.5 of dilaudid given per MAR.     1645 Patient meets phase two criteria. Patient sipping PO liquids without complication.    1720 IV removed    1725 Patient meets discharge criteria. VSS. Pt discharged via wheelchair by RN. Pt in possession of all personal belongings.

## 2023-03-14 ENCOUNTER — TELEPHONE (OUTPATIENT)
Dept: SLEEP MEDICINE | Facility: MEDICAL CENTER | Age: 43
End: 2023-03-14
Payer: COMMERCIAL

## 2023-03-14 NOTE — TELEPHONE ENCOUNTER
NIM for the pt wanting to schedule an [employer] activation [facility] apt. I have an apt on hold for the pt on 4/20/23 with Dr. Cohn at 2:40 and asked pt if this apt works for him and I advise him to give me a call back to confirm. I did provide pt my direct number.

## 2023-03-21 DIAGNOSIS — G47.33 OBSTRUCTIVE SLEEP APNEA: ICD-10-CM

## 2023-03-21 DIAGNOSIS — R53.82 CHRONIC FATIGUE: ICD-10-CM

## 2023-03-21 DIAGNOSIS — G89.18 POSTOPERATIVE PAIN: ICD-10-CM

## 2023-03-22 PROCEDURE — RXMED WILLOW AMBULATORY MEDICATION CHARGE: Performed by: INTERNAL MEDICINE

## 2023-03-22 RX ORDER — ARMODAFINIL 250 MG/1
250 TABLET ORAL DAILY
Qty: 30 TABLET | Refills: 2 | Status: SHIPPED | OUTPATIENT
Start: 2023-03-22 | End: 2023-08-04 | Stop reason: SDUPTHER

## 2023-03-22 RX ORDER — ONDANSETRON 4 MG/1
4 TABLET, ORALLY DISINTEGRATING ORAL EVERY 6 HOURS PRN
Qty: 30 TABLET | Refills: 1 | Status: SHIPPED | OUTPATIENT
Start: 2023-03-22 | End: 2023-08-12

## 2023-03-29 ENCOUNTER — PHARMACY VISIT (OUTPATIENT)
Dept: PHARMACY | Facility: MEDICAL CENTER | Age: 43
End: 2023-03-29
Payer: COMMERCIAL

## 2023-04-03 PROCEDURE — RXMED WILLOW AMBULATORY MEDICATION CHARGE: Performed by: STUDENT IN AN ORGANIZED HEALTH CARE EDUCATION/TRAINING PROGRAM

## 2023-04-10 PROCEDURE — RXMED WILLOW AMBULATORY MEDICATION CHARGE: Performed by: INTERNAL MEDICINE

## 2023-04-11 ENCOUNTER — OFFICE VISIT (OUTPATIENT)
Dept: SLEEP MEDICINE | Facility: MEDICAL CENTER | Age: 43
End: 2023-04-11
Attending: STUDENT IN AN ORGANIZED HEALTH CARE EDUCATION/TRAINING PROGRAM
Payer: COMMERCIAL

## 2023-04-11 VITALS
RESPIRATION RATE: 16 BRPM | SYSTOLIC BLOOD PRESSURE: 108 MMHG | HEART RATE: 94 BPM | DIASTOLIC BLOOD PRESSURE: 78 MMHG | OXYGEN SATURATION: 98 % | HEIGHT: 74 IN | BODY MASS INDEX: 26.44 KG/M2 | WEIGHT: 206 LBS

## 2023-04-11 DIAGNOSIS — Z96.82 S/P INSERTION OF HYPOGLOSSAL NERVE STIMULATOR: ICD-10-CM

## 2023-04-11 DIAGNOSIS — Z45.42 ENCOUNTER FOR ADJUSTMENT AND MANAGEMENT OF NEUROSTIMULATOR: ICD-10-CM

## 2023-04-11 DIAGNOSIS — G47.33 OSA (OBSTRUCTIVE SLEEP APNEA): Primary | ICD-10-CM

## 2023-04-11 PROCEDURE — 99214 OFFICE O/P EST MOD 30 MIN: CPT | Mod: 25 | Performed by: STUDENT IN AN ORGANIZED HEALTH CARE EDUCATION/TRAINING PROGRAM

## 2023-04-11 PROCEDURE — 99212 OFFICE O/P EST SF 10 MIN: CPT | Performed by: STUDENT IN AN ORGANIZED HEALTH CARE EDUCATION/TRAINING PROGRAM

## 2023-04-11 PROCEDURE — 95977 ALYS CPLX CN NPGT PRGRMG: CPT | Performed by: STUDENT IN AN ORGANIZED HEALTH CARE EDUCATION/TRAINING PROGRAM

## 2023-04-11 ASSESSMENT — FIBROSIS 4 INDEX: FIB4 SCORE: 0.7

## 2023-04-11 NOTE — PROGRESS NOTES
[facility] Follow Up Note     Date: 4/11/2023 / Time: 2:26 PM    Patient ID:   Name:             Abel Webster   YOB: 1980  Age:                 42 y.o.  male   MRN:               [id number]    CC: Management of obstructive sleep apnea and activation of hypoglossal nerve stimulator    HISTORY OF PRESENT ILLNESS:       Abel Webster is a 42 y.o. male with MDD, GERD, Asthma, and obstructive sleep apnea status post hypoglossal nerve stimulator.  Presents to Sleep Clinic for management of obstructive sleep apnea and hypoglossal nerve stimulator.     He is currently not using his CPAP.  He has had difficulty using CPAP in the past.  He had his stimulator implanted at the beginning of March.  Since implantation he had some neck discomfort.  States he was chewing a steak or other hard foods he will sometimes have some discomfort.  Overall he has not needed any pain medication.  Denies any trouble with speech or swallowing.    He does report that he goes back and forth between sleeping during the night and sleeping during the day.  When he is on a dayshift schedule he will sleep between 11 PM and 7 AM.  When he is on a night schedule he will go to bed between 8 and 10 AM and sleep approximately 7 to 8 hours.  He states it generally takes him 15 to 20 minutes to fall asleep.  Once he is asleep he may awaken 3-4 times a night.  He generally does not have trouble getting back to sleep.    He states he is somewhat nervous regarding his hypoglossal nerve stimulator.    He is eager to start therapy and get his sleep apnea treated.  He reports that he does not feel rested and is extremely tired at times during the day.  He is helpful with that using inspire device will help improve his sleep apnea.    He is planning a trip to [facility] in 10 days.    Sleep Schedule    Sleep-onset latency: 15-20min   Awakenings from sleep: 3-4 times a night   Difficulty falling back asleep: no  Bedroom  partner: yes   Naps: No       Programming and interrogation of hypoglossal nerve stimulator  Device was interrogated, thresholds were obtained consisting of:    (+-+)  Sensation: 0.5 v, Functional Threshold: 0.6v:     Waveform to asses respiratory sense lead integrity was run and saved to  report: normal    Programmed settings   1)Start Delay: 45 min  2)Pause Time: 30 min  3)Total run time: 9 hours  4) Minimum voltage 0.4 v   5) Maximum voltage 1.2v  6) Pulse configuration (+-+)  7) Current voltage 0.5v      SLEEP HISTORY   8/31/2022 PSG showed moderate obstructive sleep apnea with an overall AHI of 17 events an hour.  Minimal oxygen saturation of 81%, time out of below 88% saturation of 48 minutes         REVIEW OF SYSTEMS  Constitutional: Denies fevers, Denies weight changes  Ears/Nose/Throat/Mouth: Denies nasal congestion or sore throat, difficulty swallowing, speech changes, facial numbness  Cardiovascular: Denies chest pain or palpitations   Respiratory: Denies shortness of breath, Denies cough  Gastrointestinal/Hepatic: Denies abdominal pain, nausea, vomiting, diarrhea  Musculoskeletal/Rheum: Denies  joint pain and swelling   Sleep: See HPI    PMH:  has a past medical history of Arthropathy, Asthma (02/13/2023), Chickenpox, Chronic fatigue, Chronic pain (02/13/2023), Disorder of thyroid (02/13/2023), Fatty liver, GERD (gastroesophageal reflux disease), Heart burn (02/13/2023), Hypoplasia of mandible, IBS (irritable bowel syndrome) (02/13/2023), Maxillary hypoplasia, and Sleep apnea (02/13/2023).  MEDS:   Current Outpatient Medications:     ondansetron (ZOFRAN ODT) 4 MG TABLET DISPERSIBLE, Take 1 Tablet by mouth every 6 hours as needed for Nausea/Vomiting., Disp: 30 Tablet, Rfl: 1    Armodafinil 250 MG Tab, Take 1 tablet by mouth every day for 90 days., Disp: 30 Tablet, Rfl: 2    VITAMIN D PO, Take  by mouth every day., Disp: , Rfl:     B Complex Vitamins (VITAMIN B COMPLEX PO), Take  by mouth every  day., Disp: , Rfl:     albuterol 108 (90 Base) MCG/ACT Aero Soln inhalation aerosol, Inhale 2 Puffs every four hours as needed for Shortness of Breath., Disp: 8.5 g, Rfl: 3    levothyroxine (SYNTHROID) 25 MCG Tab, Take 1 Tablet by mouth every morning on an empty stomach., Disp: 90 Tablet, Rfl: 1    pantoprazole (PROTONIX) 40 MG Tablet Delayed Response, Take 1 Tablet by mouth every day., Disp: 90 Tablet, Rfl: 3    budesonide-formoterol (SYMBICORT) 160-4.5 MCG/ACT Aerosol, Inhale 2 puffs by mouth twice daily, Disp: 30.6 g, Rfl: 11    albuterol (PROVENTIL) 2.5mg/0.5ml Nebu Soln, Take 2.5 mg by nebulization every four hours as needed for Shortness of Breath., Disp: , Rfl:     ondansetron (ZOFRAN ODT) 4 MG TABLET DISPERSIBLE, Take 4 mg by mouth every 6 hours as needed for Nausea., Disp: , Rfl:     cholestyramine (QUESTRAN) 4 GM/DOSE powder, MIX 1 SCOOP FULL in liquid and drink TWICE A DAY (PT PREFERS CANS NDC 7639-5670-40), Disp: 2268 g, Rfl: 2  ALLERGIES: No Known Allergies  SURGHX:   Past Surgical History:   Procedure Laterality Date    SD OPEN IMPLTJ HPGLSL NRV NSTIM RA PG&RESPIR SENSOR Right 3/6/2023    Procedure: INSERTION OF HYPOGLOSSAL NERVE NEUROSTIMULATOR ELECTRODE AND GENERATOR AND BREATHING SENSOR ELECTRODE RIGHT;  Surgeon: Pedro Wen M.D.;  Location: SURGERY SAME DAY [facility];  Service: Ent    OTHER  02/13/2023 11/2021 septoplasty    OTHER  02/13/2023 2021 Hard palate expander placed    OTHER ABDOMINAL SURGERY       SOCHX:  reports that he has never smoked. He has never used smokeless tobacco. He reports current alcohol use. He reports that he does not use drugs..  FH:   Family History   Problem Relation Age of Onset    Colorectal Cancer Paternal Uncle         after age 50         Physical Exam:  Vitals/ General Appearance:   Weight/BMI: Body mass index is 26.45 kg/m².  /78 (BP Location: Left arm, Patient Position: Sitting, BP Cuff Size: Large adult)   Pulse 94   Resp 16   Ht 1.88 m (6'  "2\")   Wt 93.4 kg (206 lb)   SpO2 98%   Vitals:    04/11/23 1422   BP: 108/78   BP Location: Left arm   Patient Position: Sitting   BP Cuff Size: Large adult   Pulse: 94   Resp: 16   SpO2: 98%   Weight: 93.4 kg (206 lb)   Height: 1.88 m (6' 2\")       Pt. is alert and oriented to time, place and person. Cooperative and in no apparent distress.     Constitutional: Alert, no distress, well-groomed.  Skin: Surgical incision sites are C/D/I on chest and neck. No signs of hematoma, infection or drainage.  Eye: Round. Conjunctiva clear, lids normal. No icterus.   ENMT: Lips pink without lesions, good dentition, moist mucous membranes. Phonation normal.  Tongue: movement bilaterally intact, no fasciculations, no atrophy, Up and down movement intact.    Neck: No masses, no thyromegaly. Moves freely without pain.  CV: Pulse as reported by patient  Respiratory: Unlabored respiratory effort, no cough or audible wheeze  Psych: Alert and oriented x3, normal affect and mood.     ASSESSMENT AND PLAN   Abel Webster is a 42 y.o. male with MDD, GERD, Asthma, and obstructive sleep apnea status post hypoglossal nerve stimulator.  Presents to Sleep Clinic for management of obstructive sleep apnea and hypoglossal nerve stimulator.     1.  Moderate sleep Apnea .He  was previously on CPAP CPAP however due to intolerance He had hypoglossal nerve stimulator implanted on 3/6/2023.     The pathophysiology of sleep anea and the increased risk of cardiovascular morbidity from untreated sleep apnea is discussed in detail with the patient.    He is urged to avoid supine sleep, weight gain and alcoholic beverages since all of these can worsen sleep apnea. He is cautioned against drowsy driving. If He feels sleepy while driving, He must pull over for a break/nap, rather than persist on the road, in the interest of He own safety and that of others on the road.   Plan   - Hypoglossal nerve stimulator was interrogated.  Programming was done and " initial settings are (+-+) Sensation: 0.5 v, Functional Threshold: 0.6 v: .Patient control: 0.4 v - 1.2 v   - F/u in 4-8 weeks to assess the efficiacy of the treatment    - The patient will advance his voltage 1 setting every week as tolerable.. He will reach out to us if any questions or concerns arise before then. Teaching performed with: batteries, remote use, how to titrate voltage and to providers if patient requires dental work or pacemaker in the future. Additional teaching included: MRI/CT/ultrasound/airport security screening precautions and compatibility. Handout was provided to the patient.       2. Regarding treatment of other past medical problems and general health maintenance,  He is urged to follow up with PCP.    Spent 31 minutes discussing care plan along with care pathway related to his hypoglossal nerve stimulator.  An additional 3 minutes was spent reviewing patient chart total care time spent 34 minutes.  This time is separate from device programming and analysis on this date.      Please note portions of this record was created using voice recognition software. I have made every reasonable attempt to correct obvious errors, but I expect that there are errors of grammar and possibly content I did not discover before finalizing the note.

## 2023-04-17 ENCOUNTER — PHARMACY VISIT (OUTPATIENT)
Dept: PHARMACY | Facility: MEDICAL CENTER | Age: 43
End: 2023-04-17
Payer: COMMERCIAL

## 2023-04-17 PROCEDURE — RXMED WILLOW AMBULATORY MEDICATION CHARGE: Performed by: INTERNAL MEDICINE

## 2023-04-17 RX ORDER — INFLUENZA A VIRUS A/BRISBANE/02/2018 IVR-190 (H1N1) ANTIGEN (FORMALDEHYDE INACTIVATED), INFLUENZA A VIRUS A/KANSAS/14/2017 X-327 (H3N2) ANTIGEN (FORMALDEHYDE INACTIVATED), INFLUENZA B VIRUS B/PHUKET/3073/2013 ANTIGEN (FORMALDEHYDE INACTIVATED), AND INFLUENZA B VIRUS B/MARYLAND/15/2016 BX-69A ANTIGEN (FORMALDEHYDE INACTIVATED) 15; 15; 15; 15 UG/.5ML; UG/.5ML; UG/.5ML; UG/.5ML
0.5 INJECTION, SUSPENSION INTRAMUSCULAR
Qty: 0.5 ML | Refills: 0 | OUTPATIENT
Start: 2023-04-17 | End: 2023-04-18

## 2023-04-17 RX ORDER — BNT162B2 ORIGINAL AND OMICRON BA.4/BA.5 .1125; .1125 MG/2.25ML; MG/2.25ML
0.3 INJECTION, SUSPENSION INTRAMUSCULAR
Qty: 0.3 ML | Refills: 0 | OUTPATIENT
Start: 2023-04-17 | End: 2023-04-18

## 2023-04-18 ENCOUNTER — PHARMACY VISIT (OUTPATIENT)
Dept: PHARMACY | Facility: MEDICAL CENTER | Age: 43
End: 2023-04-18
Payer: COMMERCIAL

## 2023-04-18 PROCEDURE — RXMED WILLOW AMBULATORY MEDICATION CHARGE: Performed by: INTERNAL MEDICINE

## 2023-04-19 ENCOUNTER — PHARMACY VISIT (OUTPATIENT)
Dept: PHARMACY | Facility: MEDICAL CENTER | Age: 43
End: 2023-04-19
Payer: COMMERCIAL

## 2023-04-19 PROCEDURE — RXOTC WILLOW AMBULATORY OTC CHARGE: Performed by: PHARMACIST

## 2023-05-23 ENCOUNTER — APPOINTMENT (OUTPATIENT)
Dept: MEDICAL GROUP | Facility: MEDICAL CENTER | Age: 43
End: 2023-05-23
Payer: COMMERCIAL

## 2023-05-30 ENCOUNTER — OFFICE VISIT (OUTPATIENT)
Dept: SLEEP MEDICINE | Facility: MEDICAL CENTER | Age: 43
End: 2023-05-30
Attending: PREVENTIVE MEDICINE
Payer: COMMERCIAL

## 2023-05-30 VITALS
DIASTOLIC BLOOD PRESSURE: 70 MMHG | HEIGHT: 75 IN | BODY MASS INDEX: 27.35 KG/M2 | SYSTOLIC BLOOD PRESSURE: 120 MMHG | RESPIRATION RATE: 16 BRPM | HEART RATE: 76 BPM | WEIGHT: 220 LBS | OXYGEN SATURATION: 99 %

## 2023-05-30 DIAGNOSIS — G47.33 OSA (OBSTRUCTIVE SLEEP APNEA): ICD-10-CM

## 2023-05-30 DIAGNOSIS — Z96.82 S/P INSERTION OF HYPOGLOSSAL NERVE STIMULATOR: ICD-10-CM

## 2023-05-30 DIAGNOSIS — E03.8 SUBCLINICAL HYPOTHYROIDISM: ICD-10-CM

## 2023-05-30 PROCEDURE — 99214 OFFICE O/P EST MOD 30 MIN: CPT | Performed by: PREVENTIVE MEDICINE

## 2023-05-30 PROCEDURE — 3074F SYST BP LT 130 MM HG: CPT | Performed by: PREVENTIVE MEDICINE

## 2023-05-30 PROCEDURE — RXMED WILLOW AMBULATORY MEDICATION CHARGE: Performed by: INTERNAL MEDICINE

## 2023-05-30 PROCEDURE — 99212 OFFICE O/P EST SF 10 MIN: CPT | Performed by: PREVENTIVE MEDICINE

## 2023-05-30 PROCEDURE — 3078F DIAST BP <80 MM HG: CPT | Performed by: PREVENTIVE MEDICINE

## 2023-05-30 ASSESSMENT — FIBROSIS 4 INDEX: FIB4 SCORE: 0.7

## 2023-05-31 ENCOUNTER — PHARMACY VISIT (OUTPATIENT)
Dept: PHARMACY | Facility: MEDICAL CENTER | Age: 43
End: 2023-05-31
Payer: COMMERCIAL

## 2023-05-31 NOTE — TELEPHONE ENCOUNTER
Received request via: Pharmacy    Was the patient seen in the last year in this department? Yes    Does the patient have an active prescription (recently filled or refills available) for medication(s) requested?  yes    Does the patient have [facility] and need 100 day supply (blood pressure, diabetes and cholesterol meds only)? Patient does not have SCP  Requested Prescriptions    Pending Prescriptions Disp Refills   levothyroxine (SYNTHROID) 25 MCG Tab 90 Tablet 1    Sig: Take 1 Tablet by mouth every morning on an empty stomach.

## 2023-06-01 RX ORDER — LEVOTHYROXINE SODIUM 0.03 MG/1
25 TABLET ORAL
Qty: 90 TABLET | Refills: 1 | Status: SHIPPED | OUTPATIENT
Start: 2023-06-01 | End: 2024-02-28 | Stop reason: SDUPTHER

## 2023-06-01 NOTE — PROGRESS NOTES
[facility] Follow Up Note    Hypoglossal nerve stimulation follow-up visit    Patient ID:  Name:             Abel Webster   YOB: 1980  Age:                 42 y.o.  male   MRN:               [id number]    HISTORY OF PRESENT ILLNESS:   Today the pt reports, difficult with initial voltage surge, and intermittent arousals at night, and painful voltage increases over the past few weeks. The patient was traveling in [place] so his sleep hours were challenged the patient describes himself as exhausted with no benefit from INSPIRE.  Patient thought he was advised to increase voltage every 5 to 7 days regardless of pain or discomfort.     JAYESH History:  Sleep History:  Night #1  Sleep study results: Done by [place] [facility]  TYPE: HST   DATE:7/29/22  Diagnostic NURIA 13.8   Diagnostic  Oxygen Joseph: 80.8 under 89% 6.4 mins      Night#2  Sleep study results: Done by [place] [facility]  TYPE: HST   DATE:7/31/22  Diagnostic NURIA: 11.6   Diagnostic  Oxygen Joseph: 81.7 under 89% for 1 minute     TYPE: diagnostic   DATE: 08/31/2022  Diagnostic:AHI:overall AHI of 17.03. AHI during rem was 19.8 and AHI while supine was 17.03.  Diagnostic  Oxygen Joseph: 81% with 47.9mins at or under 88%       Severe excessive daytime sleepiness, loud snoring, and witnessed apneas for more than 10 years. He does not have a set bedtime because he sleeps whenever he possible can. He describes waking up himself like a zombie, he is unemployed because he can not sleep. He does not smoke tobacco, or marijuana, or drink alcohol. He drinks 2-3 caffeinated beverages a day and describes himself as disabled. He can not sleep on his back due to breathing difficulties. Patient is currently using armodafinil to stay awake. Patient has had two surgeries to correct sleep apnea UPPP and palate expansion.      POST INSPIRE DEVICE IMPLANTATION: HYPOGLOSSAL nerve stimulation        This is an INSPIRE patient. Implant date  was 03/06/23   by Dr. Wen.     ACTIVATION  DATE: 04/11/23 Dr. Osborn     POST IMPLANTATION:  Incisions: well-healed  Speech impairment: none   SENSATION (V):   0.5   FUNCTIONAL (V):   0.6  Electrical configuration:+-+  Waveform:    within normal limits   Symptoms reported: none   Surgical complications: none    POST IMPLANT SLEEP STUDIES DONE : N/a         KEY INSPIRE CHANGES MADE  TO DATE: Rate decreased to 30 Hz, pulse width increased to 120mcs on 05/30/23.       TODAY:  OVER the past 6 weeks this  patient increased his voltage from 0.5 up to 1.2V.     Patient usage:  7hrs 17min  @  90% greater than 4 hours.     INCOMING SETTINGS:  Start Delay (min): 45  Pause Time (min): 30  Total Sleep Time: 9 hours   Range (V):  0.4  to 1.2     AMPLITUDE (V): 1.1 (painful and disruptive)  Electrical configuration:+-+  RATE: 33  PULSE WIDTH: 90    CHANGED TODAY:  OUTGOING SETTINGS: patient reports significant improvement in decreased pain with lower voltage and no significant tug or pull of the tongue. The range was limited this time and the patient was instructed to not move his voltage up no more frequently than every 10 days.     SETTINGS:  Start Delay (min): 75  Pause Time (min): 30  Total Sleep Time:11 hours   Range (V):  0.6  to 1.0     AMPLITUDE (V): 0.7  Electrical configuration:+-+  Pulse width: 120  Rate: 30    REVIEW OF SYSTEMS:   Sleep: see HPI/CC    PMH:  has a past medical history of Arthropathy, Asthma (02/13/2023), Chickenpox, Chronic fatigue, Chronic pain (02/13/2023), Disorder of thyroid (02/13/2023), Fatty liver, GERD (gastroesophageal reflux disease), Heart burn (02/13/2023), Hypoplasia of mandible, IBS (irritable bowel syndrome) (02/13/2023), Maxillary hypoplasia, and Sleep apnea (02/13/2023).  MEDS:   Current Outpatient Medications:     ondansetron (ZOFRAN ODT) 4 MG TABLET DISPERSIBLE, Dissolve 1 Tablet by mouth every 6 hours as needed for Nausea/Vomiting., Disp: 30 Tablet, Rfl: 1    Armodafinil 250 MG  Tab, Take 1 tablet by mouth every day for 90 days., Disp: 30 Tablet, Rfl: 2    VITAMIN D PO, Take  by mouth every day., Disp: , Rfl:     B Complex Vitamins (VITAMIN B COMPLEX PO), Take  by mouth every day., Disp: , Rfl:     albuterol 108 (90 Base) MCG/ACT Aero Soln inhalation aerosol, Inhale 2 Puffs every four hours as needed for Shortness of Breath., Disp: 8.5 g, Rfl: 3    levothyroxine (SYNTHROID) 25 MCG Tab, Take 1 Tablet by mouth every morning on an empty stomach., Disp: 90 Tablet, Rfl: 1    pantoprazole (PROTONIX) 40 MG Tablet Delayed Response, Take 1 Tablet by mouth every day., Disp: 90 Tablet, Rfl: 3    budesonide-formoterol (SYMBICORT) 160-4.5 MCG/ACT Aerosol, Inhale 2 puffs by mouth twice daily, Disp: 30.6 g, Rfl: 11    albuterol (PROVENTIL) 2.5mg/0.5ml Nebu Soln, Take 2.5 mg by nebulization every four hours as needed for Shortness of Breath., Disp: , Rfl:     ondansetron (ZOFRAN ODT) 4 MG TABLET DISPERSIBLE, Take 4 mg by mouth every 6 hours as needed for Nausea., Disp: , Rfl:     cholestyramine (QUESTRAN) 4 GM/DOSE powder, MIX 1 SCOOP FULL in liquid and drink TWICE A DAY (PT PREFERS CANS NDC 3300-5552-84), Disp: 2268 g, Rfl: 2  ALLERGIES: No Known Allergies  SURGHX:   Past Surgical History:   Procedure Laterality Date    OK OPEN IMPLTJ HPGLSL NRV NSTIM RA PG&RESPIR SENSOR Right 3/6/2023    Procedure: INSERTION OF HYPOGLOSSAL NERVE NEUROSTIMULATOR ELECTRODE AND GENERATOR AND BREATHING SENSOR ELECTRODE RIGHT;  Surgeon: Pedro Wen M.D.;  Location: SURGERY SAME DAY [facility];  Service: Ent    OTHER  02/13/2023 11/2021 septoplasty    OTHER  02/13/2023 2021 Hard palate expander placed    OTHER ABDOMINAL SURGERY       SOCHX:  reports that he has never smoked. He has never used smokeless tobacco. He reports current alcohol use. He reports that he does not use drugs..  FH:   Family History   Problem Relation Age of Onset    Colorectal Cancer Paternal Uncle         after age 50       Physical  "Exam:  Vitals/ General Appearance:   Weight/BMI: Body mass index is 27.5 kg/m².  /70 (BP Location: Left arm, Patient Position: Sitting, BP Cuff Size: Adult)   Pulse 76   Resp 16   Ht 1.905 m (6' 3\")   Wt 99.8 kg (220 lb)   SpO2 99%   Vitals:    05/30/23 1500   BP: 120/70   BP Location: Left arm   Patient Position: Sitting   BP Cuff Size: Adult   Pulse: 76   Resp: 16   SpO2: 99%   Weight: 99.8 kg (220 lb)   Height: 1.905 m (6' 3\")         MEDICAL DECISION MAKING:  The medical record was reviewed as it pertains to the diagnoses listed below. This includes records from primary care,consultants notes, referral request, hospital records, labs and imaging. Any available diagnostic and titration nocturnal polysomnograms, home sleep apnea tests, continuous nocturnal oximetry results, multiple sleep latency tests, and recent compliance reports were reviewed with the patient.     ASSESSMENT AND PLAN  Abel Webster is a 42 y.o.male who is not doing well using INSPIRE due to the fact that he advanced his voltage too quickly. He has been advised to slow his voltage titration upward and to keep a sleep diary with any symptom improvement.   1. S/P insertion of hypoglossal nerve stimulator    2. JAYESH (obstructive sleep apnea)       OUTGOING settings: See above in note      I. The patient will advance his voltage every 10 days days if it is tolerated. He will reach out to us if any questions or concerns arise before then. Teaching performed with: batteries, remote use, how to titrate voltage and to providers if patient requires dental work or pacemaker in the future. Additional teaching included: MRI/CT/ultrasound/airport security screening precautions and compatibility.       II. Regarding treatment of other past medical problems and general health maintenance,  He is urged to follow up with PCP.    RETURN TO CLINIC: Return in about 8 weeks (around 7/25/2023) for inspire  follow up, with any available " provider.    My total time spent caring for the patient on the day of the encounter was 40 minutes. This includes time spent on a thorough chart review including other physician notes, all sleep studies, as well as critical labs and pulmonary and cardiac studies.  Additionally, it includes a thorough discussion of good sleep hygiene and stimulus control, as well as  the need for consistency in terms of sleep preparation and practice.    Please note that this dictation was created using voice recognition software.  I have made every reasonable attempt to correct obvious errors, I expect that there are errors of grammar and possibly content that I did not discover before finalizing this note.

## 2023-06-05 ENCOUNTER — HOSPITAL ENCOUNTER (OUTPATIENT)
Dept: LAB | Facility: MEDICAL CENTER | Age: 43
End: 2023-06-05
Attending: INTERNAL MEDICINE
Payer: COMMERCIAL

## 2023-06-05 DIAGNOSIS — E55.9 VITAMIN D DEFICIENCY: ICD-10-CM

## 2023-06-05 DIAGNOSIS — E03.8 SUBCLINICAL HYPOTHYROIDISM: ICD-10-CM

## 2023-06-05 DIAGNOSIS — E78.00 PURE HYPERCHOLESTEROLEMIA: ICD-10-CM

## 2023-06-05 DIAGNOSIS — R73.01 IFG (IMPAIRED FASTING GLUCOSE): ICD-10-CM

## 2023-06-05 LAB
25(OH)D3 SERPL-MCNC: 23 NG/ML (ref 30–100)
ALBUMIN SERPL BCP-MCNC: 4.9 G/DL (ref 3.2–4.9)
ALBUMIN/GLOB SERPL: 1.7 G/DL
ALP SERPL-CCNC: 76 U/L (ref 30–99)
ALT SERPL-CCNC: 44 U/L (ref 2–50)
ANION GAP SERPL CALC-SCNC: 11 MMOL/L (ref 7–16)
AST SERPL-CCNC: 28 U/L (ref 12–45)
BILIRUB SERPL-MCNC: 1.1 MG/DL (ref 0.1–1.5)
BUN SERPL-MCNC: 14 MG/DL (ref 8–22)
CALCIUM ALBUM COR SERPL-MCNC: 9.3 MG/DL (ref 8.5–10.5)
CALCIUM SERPL-MCNC: 10 MG/DL (ref 8.5–10.5)
CHLORIDE SERPL-SCNC: 104 MMOL/L (ref 96–112)
CHOLEST SERPL-MCNC: 207 MG/DL (ref 100–199)
CO2 SERPL-SCNC: 26 MMOL/L (ref 20–33)
CREAT SERPL-MCNC: 0.92 MG/DL (ref 0.5–1.4)
EST. AVERAGE GLUCOSE BLD GHB EST-MCNC: 114 MG/DL
GFR SERPLBLD CREATININE-BSD FMLA CKD-EPI: 106 ML/MIN/1.73 M 2
GLOBULIN SER CALC-MCNC: 2.9 G/DL (ref 1.9–3.5)
GLUCOSE SERPL-MCNC: 107 MG/DL (ref 65–99)
HBA1C MFR BLD: 5.6 % (ref 4–5.6)
HDLC SERPL-MCNC: 47 MG/DL
LDLC SERPL CALC-MCNC: 132 MG/DL
POTASSIUM SERPL-SCNC: 4.9 MMOL/L (ref 3.6–5.5)
PROT SERPL-MCNC: 7.8 G/DL (ref 6–8.2)
SODIUM SERPL-SCNC: 141 MMOL/L (ref 135–145)
TRIGL SERPL-MCNC: 142 MG/DL (ref 0–149)
TSH SERPL DL<=0.005 MIU/L-ACNC: 2.88 UIU/ML (ref 0.38–5.33)

## 2023-06-05 PROCEDURE — 84443 ASSAY THYROID STIM HORMONE: CPT

## 2023-06-05 PROCEDURE — 80053 COMPREHEN METABOLIC PANEL: CPT

## 2023-06-05 PROCEDURE — 82306 VITAMIN D 25 HYDROXY: CPT

## 2023-06-05 PROCEDURE — 80061 LIPID PANEL: CPT

## 2023-06-05 PROCEDURE — 83036 HEMOGLOBIN GLYCOSYLATED A1C: CPT

## 2023-06-05 PROCEDURE — 36415 COLL VENOUS BLD VENIPUNCTURE: CPT

## 2023-06-07 ENCOUNTER — APPOINTMENT (OUTPATIENT)
Dept: MEDICAL GROUP | Facility: MEDICAL CENTER | Age: 43
End: 2023-06-07
Payer: COMMERCIAL

## 2023-06-14 ENCOUNTER — TELEMEDICINE (OUTPATIENT)
Dept: MEDICAL GROUP | Facility: MEDICAL CENTER | Age: 43
End: 2023-06-14
Payer: COMMERCIAL

## 2023-06-14 VITALS — BODY MASS INDEX: 26.11 KG/M2 | WEIGHT: 210 LBS | HEIGHT: 75 IN

## 2023-06-14 DIAGNOSIS — G47.33 OBSTRUCTIVE SLEEP APNEA: ICD-10-CM

## 2023-06-14 DIAGNOSIS — S76.312A STRAIN OF LEFT HAMSTRING MUSCLE, INITIAL ENCOUNTER: ICD-10-CM

## 2023-06-14 DIAGNOSIS — R73.01 IFG (IMPAIRED FASTING GLUCOSE): ICD-10-CM

## 2023-06-14 DIAGNOSIS — E55.9 VITAMIN D DEFICIENCY: ICD-10-CM

## 2023-06-14 DIAGNOSIS — E03.8 SUBCLINICAL HYPOTHYROIDISM: ICD-10-CM

## 2023-06-14 DIAGNOSIS — Z11.59 NEED FOR HEPATITIS C SCREENING TEST: ICD-10-CM

## 2023-06-14 DIAGNOSIS — E78.00 PURE HYPERCHOLESTEROLEMIA: ICD-10-CM

## 2023-06-14 PROCEDURE — 99214 OFFICE O/P EST MOD 30 MIN: CPT | Mod: 95 | Performed by: INTERNAL MEDICINE

## 2023-06-14 ASSESSMENT — PATIENT HEALTH QUESTIONNAIRE - PHQ9
SUM OF ALL RESPONSES TO PHQ QUESTIONS 1-9: 0
4. FEELING TIRED OR HAVING LITTLE ENERGY: NOT AT ALL
8. MOVING OR SPEAKING SO SLOWLY THAT OTHER PEOPLE COULD HAVE NOTICED. OR THE OPPOSITE, BEING SO FIGETY OR RESTLESS THAT YOU HAVE BEEN MOVING AROUND A LOT MORE THAN USUAL: NOT AT ALL
7. TROUBLE CONCENTRATING ON THINGS, SUCH AS READING THE NEWSPAPER OR WATCHING TELEVISION: NOT AT ALL
3. TROUBLE FALLING OR STAYING ASLEEP OR SLEEPING TOO MUCH: NOT AT ALL
9. THOUGHTS THAT YOU WOULD BE BETTER OFF DEAD, OR OF HURTING YOURSELF: NOT AT ALL
SUM OF ALL RESPONSES TO PHQ9 QUESTIONS 1 AND 2: 0
1. LITTLE INTEREST OR PLEASURE IN DOING THINGS: NOT AT ALL
2. FEELING DOWN, DEPRESSED, IRRITABLE, OR HOPELESS: NOT AT ALL
6. FEELING BAD ABOUT YOURSELF - OR THAT YOU ARE A FAILURE OR HAVE LET YOURSELF OR YOUR FAMILY DOWN: NOT AL ALL
5. POOR APPETITE OR OVEREATING: NOT AT ALL

## 2023-06-14 ASSESSMENT — FIBROSIS 4 INDEX: FIB4 SCORE: 0.72

## 2023-06-14 NOTE — PROGRESS NOTES
Virtual Visit: Established Patient   This visit was conducted via Zoom using secure and encrypted videoconferencing technology. The patient was in a private location in the state of [place].    The patient's identity was confirmed and verbal consent was obtained for this virtual visit.    Subjective:   CC:   Chief Complaint   Patient presents with    Follow-Up    Lab Results       Abel Webster is a 42 y.o. male presenting for evaluation and management of above:    Came back from international trip to [place] [employer] and [place] a few weeks ago. He is feeling fine now.  Asthma was flaring up a little with adjustment which is okay now.  He has been noticing back of upper leg pain when he swings his legs to walk.  This is intermittent.    He is getting adjustments will inspire device.  It is not functioning optimally yet.  He has follow-up in 40 days after adjustment.  He is frustrated that the process is taking longer.    ROS    As above    No Known Allergies    Current medicines (including changes today)  Current Outpatient Medications   Medication Sig Dispense Refill    levothyroxine (SYNTHROID) 25 MCG Tab Take 1 Tablet by mouth every morning on an empty stomach. 90 Tablet 1    ondansetron (ZOFRAN ODT) 4 MG TABLET DISPERSIBLE Dissolve 1 Tablet by mouth every 6 hours as needed for Nausea/Vomiting. 30 Tablet 1    Armodafinil 250 MG Tab Take 1 tablet by mouth every day for 90 days. 30 Tablet 2    VITAMIN D PO Take  by mouth every day.      B Complex Vitamins (VITAMIN B COMPLEX PO) Take  by mouth every day.      albuterol 108 (90 Base) MCG/ACT Aero Soln inhalation aerosol Inhale 2 Puffs every four hours as needed for Shortness of Breath. 8.5 g 3    pantoprazole (PROTONIX) 40 MG Tablet Delayed Response Take 1 Tablet by mouth every day. 90 Tablet 3    budesonide-formoterol (SYMBICORT) 160-4.5 MCG/ACT Aerosol Inhale 2 puffs by mouth twice daily 30.6 g 11    albuterol (PROVENTIL) 2.5mg/0.5ml Nebu Soln Take 2.5 mg by  "nebulization every four hours as needed for Shortness of Breath.      ondansetron (ZOFRAN ODT) 4 MG TABLET DISPERSIBLE Take 4 mg by mouth every 6 hours as needed for Nausea.      cholestyramine (QUESTRAN) 4 GM/DOSE powder MIX 1 SCOOP FULL in liquid and drink TWICE A DAY (PT PREFERS [place] NDC 4765-3605-54) 2268 g 2     No current facility-administered medications for this visit.       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    Hypertrophy, nasal, turbinate 07/03/2019    Fatty liver 11/07/2018    Migraines 11/07/2018    Asthma 11/07/2018    Jaw pain 07/18/2017    Hemorrhoids 03/15/2017    Gastroesophageal reflux disease 03/14/2017    Major depressive disorder, recurrent episode (HCC) 08/31/2016    Arthropathy of lumbar facet joint 07/12/2016    Chronic low back pain 02/14/2016    Obstructive sleep apnea 09/09/2015    Plantar fasciitis, bilateral 08/19/2015    Anal pain 05/12/2015    Irritable bowel syndrome 11/17/2011    Allergic rhinitis 05/24/1996          Objective:   Ht 1.905 m (6' 3\")   Wt 95.3 kg (210 lb)   BMI 26.25 kg/m²     Physical Exam:   Constitutional: Alert, no distress, well-groomed.  Skin: No rashes in visible areas.  Eye: Round. Conjunctiva clear, lids normal. No icterus.   ENMT: Lips pink without lesions, moist mucous membranes. Phonation normal.  Neck: No visible masses   Respiratory: Unlabored respiratory effort, no cough or audible wheeze  Psych: Alert and oriented x3, normal affect and mood.       Assessment and Plan:   The following treatment plan was discussed:     1. Strain of left hamstring muscle, initial encounter  - Likely muscle tension/tightness after airplane rides.  Recommended regular mobility, repositioning every hour. "     2. Subclinical hypothyroidism  Stable.  Continue current dose.  - TSH WITH REFLEX TO FT4; Future    3. Vitamin D deficiency  Improving but not normal yet.  He did not take supplement during the trip.  She is restarting now.  Continue current dose and recheck in 6 months.  - VITAMIN D,25 HYDROXY (DEFICIENCY); Future    4. Pure hypercholesterolemia  The 10-year ASCVD risk score (Lee GARCIA, et al., 2019) is: 1.5%  No indication for statin.  Continue healthy lifestyle.  - Comp Metabolic Panel; Future  - Lipid Profile; Future    5. IFG (impaired fasting glucose)  A1c is increasing to normal upper level.  Discussed healthy plate model, healthy protein, whole grain carb, vegetables, healthy fat.   - HEMOGLOBIN A1C; Future    6. Need for hepatitis C screening test  - HEP C VIRUS ANTIBODY; Future    7. Obstructive sleep apnea  - f/b sleep medicine.      Follow-up: Return in about 6 months (around 12/14/2023).

## 2023-07-18 PROCEDURE — RXMED WILLOW AMBULATORY MEDICATION CHARGE: Performed by: PREVENTIVE MEDICINE

## 2023-07-19 ENCOUNTER — PHARMACY VISIT (OUTPATIENT)
Dept: PHARMACY | Facility: MEDICAL CENTER | Age: 43
End: 2023-07-19
Payer: COMMERCIAL

## 2023-08-03 ENCOUNTER — OFFICE VISIT (OUTPATIENT)
Dept: SLEEP MEDICINE | Facility: MEDICAL CENTER | Age: 43
End: 2023-08-03
Attending: STUDENT IN AN ORGANIZED HEALTH CARE EDUCATION/TRAINING PROGRAM
Payer: COMMERCIAL

## 2023-08-03 VITALS
DIASTOLIC BLOOD PRESSURE: 82 MMHG | HEART RATE: 100 BPM | BODY MASS INDEX: 26.18 KG/M2 | HEIGHT: 74 IN | WEIGHT: 204 LBS | RESPIRATION RATE: 16 BRPM | OXYGEN SATURATION: 98 % | SYSTOLIC BLOOD PRESSURE: 110 MMHG

## 2023-08-03 DIAGNOSIS — Z45.42 ENCOUNTER FOR ADJUSTMENT AND MANAGEMENT OF NEUROSTIMULATOR: ICD-10-CM

## 2023-08-03 DIAGNOSIS — G47.33 OSA (OBSTRUCTIVE SLEEP APNEA): Primary | ICD-10-CM

## 2023-08-03 DIAGNOSIS — Z96.82 S/P INSERTION OF HYPOGLOSSAL NERVE STIMULATOR: ICD-10-CM

## 2023-08-03 PROCEDURE — 95977 ALYS CPLX CN NPGT PRGRMG: CPT | Performed by: STUDENT IN AN ORGANIZED HEALTH CARE EDUCATION/TRAINING PROGRAM

## 2023-08-03 PROCEDURE — 3074F SYST BP LT 130 MM HG: CPT | Performed by: STUDENT IN AN ORGANIZED HEALTH CARE EDUCATION/TRAINING PROGRAM

## 2023-08-03 PROCEDURE — 99214 OFFICE O/P EST MOD 30 MIN: CPT | Mod: 25 | Performed by: STUDENT IN AN ORGANIZED HEALTH CARE EDUCATION/TRAINING PROGRAM

## 2023-08-03 PROCEDURE — 3079F DIAST BP 80-89 MM HG: CPT | Performed by: STUDENT IN AN ORGANIZED HEALTH CARE EDUCATION/TRAINING PROGRAM

## 2023-08-03 PROCEDURE — 99212 OFFICE O/P EST SF 10 MIN: CPT | Performed by: STUDENT IN AN ORGANIZED HEALTH CARE EDUCATION/TRAINING PROGRAM

## 2023-08-03 ASSESSMENT — FIBROSIS 4 INDEX: FIB4 SCORE: 0.72

## 2023-08-03 NOTE — PROGRESS NOTES
[facility] Follow-up Visit    CC: Follow-up regarding obstructive sleep apnea management      HPI:  Abel Webster is a 42 y.o.male  with MDD, GERD, asthma, and obstructive sleep apnea status post hypoglossal nerve stimulator.  Presents today to follow-up regarding management of obstructive sleep apnea.    He is accompanied by his wife at today's visit.    He states he is been having difficulty using the device.  This pertains to if he tries to go above level 1 it will wake him up at night.  He feels that he has shallow periods of sleep during his sleep time that if the device is on it wakes him up immediately.  He will then have a hard time getting back to sleep.  He feels that the cycles throughout the night and he never gets a decent night rest.    He did try the hydroxyzine which was prescribed since last visit.  In doing so he felt that he was extensively drowsy the next day.  He tried of both with inspire device and without.    He feels a sensation at times that with the device is on he has a throat closing sensation.    He is becoming frustrated with not getting enough sleep.  He feels that due to not getting enough sleep and having poor sleep that he is tired during his weight.  During the night.  He has low energy during the day and motivation to do anything.  He does not exercise regularly due to his decreased energy.    When he lays on his left side with his arm near his neck and his head slightly down he feels a buzzing sensation from the device when it is on.  He has the buzzing in his ear which wakes him up.  He states he does not have sensation if he uses a pillow but more if it is a hard device.    His wife did call inspire since last visit who recommended slowly decreasing the voltage and seeing if his sensation as well as his buzzing improves.  He reports he has not been using his device in the past week.      Current wake and sleep times  6-7am bedtime   6-7pm awake time      Programming and interrogation of hypoglossal nerve stimulator  Stimulation was delivered to incoming voltage and settings which elicited tongue protrusion midline.  Increasing voltage to 0.7 V did increase the tongue protrusion but patient found this uncomfortable.  Decreased pulse width and changed rate back to default settings and patient did not notice a significant difference from previous.  Due to difficulty using device at default settings electrode configurations were explored.  Off minus off, minus off minus, minus minus minus, overall use.  Voltage range during this time was between 0.2 to 0.5 V.    Patient did best with off minus off.  At this setting there was a leftward deviation noted on his tongue protrusion.  Different voltages were tried at 0.3 to 0.4 V.  In addition to different pulse width were used between 90 µs and 120 µs.  Patient did feel that at 0.4 V in the off minus off setting at 90 µs pulse width he found this to be comfortable with a leftward deviation that was opening the back of his tongue and not giving him a choking sensation as previous settings had.  Waveform was ran at this voltage to allow for patient to experience stimulation.  He found it comfortable and would like to proceed with this at home.  Normal waveform was seen.    Incoming settings  1)Start Delay: 75 min  2)Pause Time: 30 min  3)Total run time: 11 hours  4) Minimum voltage 0.6 v   5) Maximum voltage 1.0v  6) Pulse configuration (+-+)  7) Pulse Width 120 microseconds  8) Rate 30 Hz  9) Current voltage 0.6v    Outgoing (changes in bold)  1)Start Delay: 75 min  2)Pause Time: 30 min  3)Total run time: 11 hours  4) Minimum voltage 0.3 v   5) Maximum voltage 0.7v  6) Pulse configuration (-off-)  7) Pulse Width 90 microseconds  8) Rate 33 Hz  9) Current voltage 0.4v        Patient Active Problem List    Diagnosis Date Noted    Skin lesion of face 05/20/2022    Low testosterone 05/20/2022    Anal fissure 05/20/2022     Moderate persistent asthma without complication 05/20/2022    Chronic fatigue 02/10/2021    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    Hypertrophy, nasal, turbinate 07/03/2019    Fatty liver 11/07/2018    Migraines 11/07/2018    Asthma 11/07/2018    Jaw pain 07/18/2017    Hemorrhoids 03/15/2017    Gastroesophageal reflux disease 03/14/2017    Major depressive disorder, recurrent episode (HCC) 08/31/2016    Arthropathy of lumbar facet joint 07/12/2016    Chronic low back pain 02/14/2016    Obstructive sleep apnea 09/09/2015    Plantar fasciitis, bilateral 08/19/2015    Anal pain 05/12/2015    Irritable bowel syndrome 11/17/2011    Allergic rhinitis 05/24/1996       Past Medical History:   Diagnosis Date    Arthropathy     Asthma 02/13/2023    inhalers daily and prn    Chickenpox     Chronic fatigue     Chronic pain 02/13/2023    back, buttocks    Disorder of thyroid 02/13/2023    medicated    Fatty liver     GERD (gastroesophageal reflux disease)     Heart burn 02/13/2023    medicated    Hypoplasia of mandible     IBS (irritable bowel syndrome) 02/13/2023    medicated    Maxillary hypoplasia     Sleep apnea 02/13/2023    positive JAYESH, but is unable to use CPAP        Past Surgical History:   Procedure Laterality Date    [place] OPEN IMPLTJ HPGLSL NRV NSTIM RA PG&RESPIR SENSOR Right 3/6/2023    Procedure: INSERTION OF HYPOGLOSSAL NERVE NEUROSTIMULATOR ELECTRODE AND GENERATOR AND BREATHING SENSOR ELECTRODE RIGHT;  Surgeon: Pedro Wen M.D.;  Location: SURGERY SAME DAY [facility];  Service: Ent    OTHER  02/13/2023 11/2021 septoplasty    OTHER  02/13/2023 2021 Hard palate expander placed    OTHER ABDOMINAL SURGERY         Family History   Problem Relation Age of Onset    Colorectal Cancer Paternal Uncle         after age 50       Social History     Socioeconomic History    Marital status:      Spouse name: Not on file    Number of children: Not on file    Years of education: Not on  file    Highest education level: Not on file   Occupational History    Not on file   Tobacco Use    Smoking status: Never    Smokeless tobacco: Never   Vaping Use    Vaping Use: Never used   Substance and Sexual Activity    Alcohol use: Yes     Comment: very rarely    Drug use: Never    Sexual activity: Yes     Partners: Female     Birth control/protection: None   Other Topics Concern    Not on file   Social History Narrative    Not on file     Social Determinants of Health     Financial Resource Strain: Not on file   Food Insecurity: Not on file   Transportation Needs: Not on file   Physical Activity: Not on file   Stress: Not on file   Social Connections: Not on file   Intimate Partner Violence: Not on file   Housing Stability: Not on file       Current Outpatient Medications   Medication Sig Dispense Refill    hydrOXYzine HCl (ATARAX) 25 MG Tab Take 1 Tablet by mouth at bedtime as needed for Anxiety (May taking 1 or 2 tabs as needed for sleep.) for up to 20 days. 40 tablets is enough for 20 days. 40 Tablet 0    levothyroxine (SYNTHROID) 25 MCG Tab Take 1 Tablet by mouth every morning on an empty stomach. 90 Tablet 1    ondansetron (ZOFRAN ODT) 4 MG TABLET DISPERSIBLE Dissolve 1 Tablet by mouth every 6 hours as needed for Nausea/Vomiting. 30 Tablet 1    VITAMIN D PO Take  by mouth every day.      B Complex Vitamins (VITAMIN B COMPLEX PO) Take  by mouth every day.      albuterol 108 (90 Base) MCG/ACT Aero Soln inhalation aerosol Inhale 2 Puffs every four hours as needed for Shortness of Breath. 8.5 g 3    pantoprazole (PROTONIX) 40 MG Tablet Delayed Response Take 1 Tablet by mouth every day. 90 Tablet 3    budesonide-formoterol (SYMBICORT) 160-4.5 MCG/ACT Aerosol Inhale 2 puffs by mouth twice daily 30.6 g 11    albuterol (PROVENTIL) 2.5mg/0.5ml Nebu Soln Take 2.5 mg by nebulization every four hours as needed for Shortness of Breath.      ondansetron (ZOFRAN ODT) 4 MG TABLET DISPERSIBLE Take 4 mg by mouth every 6  "hours as needed for Nausea.      cholestyramine (QUESTRAN) 4 GM/DOSE powder MIX 1 SCOOP FULL in liquid and drink TWICE A DAY (PT PREFERS CANS NDC 6518-8321-79) 2268 g 2     No current facility-administered medications for this visit.        ALLERGIES: Patient has no known allergies.    ROS  Constitutional: Denies fevers, Denies weight changes  Ears/Nose/Throat/Mouth: Denies nasal congestion or sore throat   Cardiovascular: Denies chest pain  Respiratory: Denies shortness of breath, Denies cough  Gastrointestinal/Hepatic: Denies nausea, vomiting  Sleep: see HPI      PHYSICAL EXAM  /82 (BP Location: Left arm, Patient Position: Sitting, BP Cuff Size: Large adult)   Pulse 100   Resp 16   Ht 1.88 m (6' 2\")   Wt 92.5 kg (204 lb)   SpO2 98%   BMI 26.19 kg/m²   Appearance: Well-nourished, well-developed, no acute distress  Eyes:  No scleral icterus , EOMI  Musculoskeletal:  Grossly normal; gait and station normal; digits and nails normal  Skin:  No rashes, petechiae, cyanosis  Neurologic: without focal signs; oriented to person, time, place, and purpose; judgement intact      Medical Decision Making   Assessment and Plan  Abel Webster is a 42 y.o.male  with MDD, GERD, asthma, and obstructive sleep apnea status post hypoglossal nerve stimulator.  Presents today to follow-up regarding management of obstructive sleep apnea.    The medical record was reviewed.    Obstructive sleep apnea  Discussed inspire device in depth including advanced settings that can be used.  Discussed this with him and his partner. Reviewed how changing different settings on the device can modify the stimulation and movement of the tongue.  Advised that patient would benefit from continued use of device if we can find comfortable settings at today's visit.  Extensive programming was done at today's visit exploring multiple options.    Answered all of patient's questions related to inspire and sleep apnea and sleep.  There is multiple " discussions about sleep schedule as well as importance of controlling sleep apnea.    Advised patient not to increase his voltage for at least 2 weeks.  If the stimulation is too strong 10 at tonight's visit may decrease voltage by 1 setting.  Patient has a very low threshold for tongue movement as well as sensation.  Discussed that this may be making it difficult to have titration and may have over titrated initially.    PLAN:   -Continue to use hypoglossal nerve stimulator nightly  -Increase voltage settings very slowly and may stop once he starts noticing symptomatic benefit  -Advised to reach out via MyChart with questions     Patients with JAYESH are at increased risk of cardiovascular disease including coronary artery disease, systemic arterial hypertension, pulmonary arterial hypertension, cardiac arrythmias, and stroke. The patient was advised to avoid driving a motor vehicle when drowsy.    Total time care for the patient this date was 40 minutes. Time spent includes chart review, lab review, discussion with myself. This time was spent separate from device analysis /programming on this date.   Total length of visit with patient face to face with procedure was 80 min     Have advised the patient to follow up with the appropriate healthcare practitioners for all other medical problems and issues.    Return in about 2 months (around 10/3/2023).      Please note portions of this record was created using voice recognition software. I have made every reasonable attempt to correct obvious errors, but I expect that there are errors of grammar and possibly content I did not discover before finalizing the note.

## 2023-08-04 ENCOUNTER — PHARMACY VISIT (OUTPATIENT)
Dept: PHARMACY | Facility: MEDICAL CENTER | Age: 43
End: 2023-08-04

## 2023-08-04 DIAGNOSIS — G47.33 OBSTRUCTIVE SLEEP APNEA: ICD-10-CM

## 2023-08-04 DIAGNOSIS — R53.82 CHRONIC FATIGUE: ICD-10-CM

## 2023-08-04 PROCEDURE — RXMED WILLOW AMBULATORY MEDICATION CHARGE: Performed by: INTERNAL MEDICINE

## 2023-08-04 PROCEDURE — RXOTC WILLOW AMBULATORY OTC CHARGE: Performed by: PHARMACIST

## 2023-08-04 RX ORDER — ARMODAFINIL 250 MG/1
250 TABLET ORAL DAILY
Qty: 30 TABLET | Refills: 2 | Status: SHIPPED | OUTPATIENT
Start: 2023-08-04 | End: 2023-12-13 | Stop reason: SDUPTHER

## 2023-08-08 ENCOUNTER — PHARMACY VISIT (OUTPATIENT)
Dept: PHARMACY | Facility: MEDICAL CENTER | Age: 43
End: 2023-08-08
Payer: COMMERCIAL

## 2023-08-08 PROCEDURE — RXMED WILLOW AMBULATORY MEDICATION CHARGE: Performed by: INTERNAL MEDICINE

## 2023-08-09 ENCOUNTER — PHARMACY VISIT (OUTPATIENT)
Dept: PHARMACY | Facility: MEDICAL CENTER | Age: 43
End: 2023-08-09
Payer: COMMERCIAL

## 2023-08-09 RX ORDER — BUDESONIDE AND FORMOTEROL FUMARATE DIHYDRATE 160; 4.5 UG/1; UG/1
AEROSOL RESPIRATORY (INHALATION)
Qty: 30.6 G | Refills: 11 | Status: SHIPPED | OUTPATIENT
Start: 2023-08-09

## 2023-08-12 ENCOUNTER — OFFICE VISIT (OUTPATIENT)
Dept: URGENT CARE | Facility: CLINIC | Age: 43
End: 2023-08-12
Payer: COMMERCIAL

## 2023-08-12 VITALS
BODY MASS INDEX: 27.27 KG/M2 | TEMPERATURE: 97.7 F | OXYGEN SATURATION: 96 % | HEIGHT: 74 IN | HEART RATE: 102 BPM | SYSTOLIC BLOOD PRESSURE: 116 MMHG | WEIGHT: 212.5 LBS | RESPIRATION RATE: 16 BRPM | DIASTOLIC BLOOD PRESSURE: 82 MMHG

## 2023-08-12 DIAGNOSIS — M79.672 LEFT FOOT PAIN: ICD-10-CM

## 2023-08-12 PROCEDURE — 3079F DIAST BP 80-89 MM HG: CPT | Performed by: NURSE PRACTITIONER

## 2023-08-12 PROCEDURE — 99213 OFFICE O/P EST LOW 20 MIN: CPT | Performed by: NURSE PRACTITIONER

## 2023-08-12 PROCEDURE — 3074F SYST BP LT 130 MM HG: CPT | Performed by: NURSE PRACTITIONER

## 2023-08-12 ASSESSMENT — FIBROSIS 4 INDEX: FIB4 SCORE: 0.73

## 2023-08-12 ASSESSMENT — ENCOUNTER SYMPTOMS
NUMBNESS: 0
JOINT SWELLING: 0

## 2023-08-13 NOTE — PROGRESS NOTES
Subjective:   Abel Webster is a 43 y.o. male who presents for Foot Injury ((L) foot bottom arch, painful, kicked a rock in the river X today)      Foot Problem  This is a new problem. The current episode started today (Injured left foot while floating the river today.  Hit his foot directly on a rock causing pain). The problem occurs constantly. The problem has been gradually worsening. Pertinent negatives include no joint swelling, numbness or rash. Associated symptoms comments: Left foot pain. The symptoms are aggravated by walking. He has tried nothing for the symptoms.       Review of Systems   Musculoskeletal:  Positive for joint pain. Negative for joint swelling.   Skin:  Negative for itching and rash.   Neurological:  Negative for numbness.       Medications:    albuterol Aers  albuterol Nebu  Armodafinil Tabs  budesonide-formoterol Aero  cholestyramine  levothyroxine Tabs  ondansetron Tbdp  pantoprazole Tbec  VITAMIN B COMPLEX PO  VITAMIN D PO    Allergies: Patient has no known allergies.    Problem List: Abel Webster does not have any pertinent problems on file.    Surgical History:  Past Surgical History:   Procedure Laterality Date    [place] OPEN IMPLTJ HPGLSL NRV NSTIM RA PG&RESPIR SENSOR Right 3/6/2023    Procedure: INSERTION OF HYPOGLOSSAL NERVE NEUROSTIMULATOR ELECTRODE AND GENERATOR AND BREATHING SENSOR ELECTRODE RIGHT;  Surgeon: Pedro Wen M.D.;  Location: SURGERY SAME DAY [facility];  Service: Ent    OTHER  02/13/2023 11/2021 septoplasty    OTHER  02/13/2023 2021 Hard palate expander placed    OTHER ABDOMINAL SURGERY         Past Social Hx: Abel Webster  reports that he has never smoked. He has never used smokeless tobacco. He reports current alcohol use. He reports that he does not use drugs.     Past Family Hx:  Abel Webster family history includes Colorectal Cancer in his paternal uncle.     Problem list, medications, and allergies reviewed by myself today in  "Epic.     Objective:     /82 (BP Location: Left arm, Patient Position: Sitting, BP Cuff Size: Large adult long)   Pulse (!) 102   Temp 36.5 °C (97.7 °F) (Temporal)   Resp 16   Ht 1.88 m (6' 2\")   Wt 96.4 kg (212 lb 8 oz)   SpO2 96%   BMI 27.28 kg/m²     Physical Exam  Constitutional:       Appearance: Normal appearance. He is not ill-appearing or toxic-appearing.   HENT:      Head: Normocephalic.      Right Ear: External ear normal.      Left Ear: External ear normal.      Nose: Nose normal.      Mouth/Throat:      Lips: Pink.   Eyes:      General: Lids are normal.   Pulmonary:      Effort: Pulmonary effort is normal. No accessory muscle usage.   Musculoskeletal:      Cervical back: Full passive range of motion without pain.      Left foot: Normal range of motion and normal capillary refill. Tenderness and bony tenderness present. No swelling, deformity or crepitus. Normal pulse.        Feet:    Feet:      Left foot:      Skin integrity: No erythema.      Comments: Tenderness palpation to the plantar aspect left foot, no gross deformities,  Skin:     Findings: No bruising or ecchymosis.   Neurological:      Mental Status: He is alert and oriented to person, place, and time.   Psychiatric:         Mood and Affect: Mood normal.         Thought Content: Thought content normal.         Assessment/Plan:     Diagnosis and associated orders:     1. Left foot pain  DX-FOOT-COMPLETE 3+ LEFT    CANCELED: DX-FOOT-COMPLETE 3+ RIGHT         Comments/MDM:     Patient is a 43-year-old male present with the stated above, patient have an injury to the left foot unfortunately x-ray imaging unavailable at clinical location this evening.  Provided patient with a foot x-ray will follow-up with results.  Patient having pain with ambulation did provide a walking boot and crutches advised weightbearing as tolerated.  Recommended to rest, ice, Tyle Motrin as needed for pain.  Patient will follow-up with orthopedics if fracture " is noted on x-ray.  Differential diagnosis, natural history, supportive care, and indications for immediate follow-up discussed.                Please note that this dictation was created using voice recognition software. I have made a reasonable attempt to correct obvious errors, but I expect that there are errors of grammar and possibly content that I did not discover before finalizing the note.    This note was electronically signed by Sabas JOSE.

## 2023-09-05 PROCEDURE — RXMED WILLOW AMBULATORY MEDICATION CHARGE: Performed by: INTERNAL MEDICINE

## 2023-09-07 ENCOUNTER — PHARMACY VISIT (OUTPATIENT)
Dept: PHARMACY | Facility: MEDICAL CENTER | Age: 43
End: 2023-09-07
Payer: COMMERCIAL

## 2023-10-03 ENCOUNTER — OFFICE VISIT (OUTPATIENT)
Dept: SLEEP MEDICINE | Facility: MEDICAL CENTER | Age: 43
End: 2023-10-03
Attending: STUDENT IN AN ORGANIZED HEALTH CARE EDUCATION/TRAINING PROGRAM
Payer: COMMERCIAL

## 2023-10-03 ENCOUNTER — PHARMACY VISIT (OUTPATIENT)
Dept: PHARMACY | Facility: MEDICAL CENTER | Age: 43
End: 2023-10-03
Payer: COMMERCIAL

## 2023-10-03 VITALS
HEART RATE: 105 BPM | SYSTOLIC BLOOD PRESSURE: 118 MMHG | HEIGHT: 74 IN | RESPIRATION RATE: 16 BRPM | WEIGHT: 217 LBS | BODY MASS INDEX: 27.85 KG/M2 | DIASTOLIC BLOOD PRESSURE: 86 MMHG | OXYGEN SATURATION: 98 %

## 2023-10-03 DIAGNOSIS — G47.33 OSA (OBSTRUCTIVE SLEEP APNEA): Primary | ICD-10-CM

## 2023-10-03 DIAGNOSIS — Z45.42 ENCOUNTER FOR ADJUSTMENT AND MANAGEMENT OF NEUROSTIMULATOR: ICD-10-CM

## 2023-10-03 DIAGNOSIS — Z96.82 S/P INSERTION OF HYPOGLOSSAL NERVE STIMULATOR: ICD-10-CM

## 2023-10-03 DIAGNOSIS — F51.04 CHRONIC INSOMNIA: ICD-10-CM

## 2023-10-03 PROCEDURE — 99212 OFFICE O/P EST SF 10 MIN: CPT | Performed by: STUDENT IN AN ORGANIZED HEALTH CARE EDUCATION/TRAINING PROGRAM

## 2023-10-03 PROCEDURE — 95977 ALYS CPLX CN NPGT PRGRMG: CPT | Performed by: STUDENT IN AN ORGANIZED HEALTH CARE EDUCATION/TRAINING PROGRAM

## 2023-10-03 PROCEDURE — RXMED WILLOW AMBULATORY MEDICATION CHARGE: Performed by: STUDENT IN AN ORGANIZED HEALTH CARE EDUCATION/TRAINING PROGRAM

## 2023-10-03 PROCEDURE — RXOTC WILLOW AMBULATORY OTC CHARGE: Performed by: PHARMACIST

## 2023-10-03 PROCEDURE — 99214 OFFICE O/P EST MOD 30 MIN: CPT | Mod: 25 | Performed by: STUDENT IN AN ORGANIZED HEALTH CARE EDUCATION/TRAINING PROGRAM

## 2023-10-03 PROCEDURE — 3074F SYST BP LT 130 MM HG: CPT | Performed by: STUDENT IN AN ORGANIZED HEALTH CARE EDUCATION/TRAINING PROGRAM

## 2023-10-03 PROCEDURE — 3079F DIAST BP 80-89 MM HG: CPT | Performed by: STUDENT IN AN ORGANIZED HEALTH CARE EDUCATION/TRAINING PROGRAM

## 2023-10-03 RX ORDER — ESZOPICLONE 3 MG/1
3 TABLET, FILM COATED ORAL NIGHTLY
Qty: 30 TABLET | Refills: 0 | Status: SHIPPED | OUTPATIENT
Start: 2023-10-03 | End: 2023-11-02

## 2023-10-03 ASSESSMENT — FIBROSIS 4 INDEX: FIB4 SCORE: 0.73

## 2023-10-03 NOTE — PROGRESS NOTES
[facility] Follow-up Visit    CC: Follow-up regarding management of obstructive sleep apnea      HPI:  Abel Webster is a 43 y.o.male  with MDD, GERD, asthma, chronic insomnia, and obstructive sleep apnea status post hypoglossal nerve stimulator.  Presents today to follow-up regarding management of obstructive sleep apnea.    He is accompanied by his wife at today's visit.    He continues to have difficulty using device.  He feels that he would not level 1 he does not get a quality night sleep.  He feels although level 1 does not wake him up he feels as though does not improve his sleep.-Increase to level 2 he feels that stimulation wakes him out of sleep.  He feels that he is always in a lighter sleep and never gets into a deep sleep.  He is still sleeping during the day and being awake at night.    He has not been using his device regularly.    He continues to have difficulty getting a quality night sleep.  He feels he never gets into a deep sleep and he awakens very easily.  He has no trouble falling asleep initially.  He is tired throughout the day he feels he is exhausted throughout the day which makes it difficult to function.  He feels that this been happening for the past 13 years and he does not have an answer.    With using the device he still feels a buzzing sensation if he lays on his right side and has his arm up against the wire.  If he uses a pillow this is improved.  He states that he cannot control it at times when he is sleeping.  He does not unknowingly.  This will wake him up.  He does feel that the choking sensation he has with the device has been improved since the last adjustments to his device.    He does voice his frustration with regarding his poor quality of sleep and that the device has not made a huge difference.    Programming interrogation of hypoglossal nerve stimulator  Stimulation was delivered to incoming settings which elicited tongue protrusion with leftward  deviation in supine position.  Adjusted voltage decreasing which elicited less protrusion but still tongue movement was noted down to 0.2 V.  At 0.1 V last time protrusion was noted.  Due to patient's buzzing sensation and increased pulse with.  With the increased pulse width of 120 µs patient did feel as a little longer and smoother.  This may improve the buzzing.    All adjustments were made with patient laying down in the exam room today.    At outgoing settings waveform was allowed to run to allow patient to feel stimulation being on.  He did find it to be tolerable he states it is difficult to know here how to work at home.    Incoming settings  1)Start Delay: 75 min  2)Pause Time: 30 min  3)Total run time: 11 hours  4) Minimum voltage 0.3 v   5) Maximum voltage 0.7v  6) Pulse configuration (-off-)  7) Pulse Width 90 microseconds  8) Rate 33 Hz  9) Current voltage 0.4v    Outgoing settings changes in bold  1)Start Delay: 75 min  2)Pause Time: 30 min  3)Total run time: 11 hours  4) Minimum voltage 0.1 v   5) Maximum voltage 0.4v  6) Pulse configuration (-off-)  7) Pulse Width 120 microseconds  8) Rate 33 Hz  9) Current voltage 0.2v      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    Hypertrophy, nasal, turbinate 07/03/2019    Fatty liver 11/07/2018    Migraines 11/07/2018    Asthma 11/07/2018    Jaw pain 07/18/2017    Hemorrhoids 03/15/2017    Gastroesophageal reflux disease 03/14/2017    Major depressive disorder, recurrent episode (HCC) 08/31/2016    Arthropathy of lumbar facet joint 07/12/2016    Chronic low back pain 02/14/2016    Obstructive sleep apnea 09/09/2015    Plantar fasciitis, bilateral 08/19/2015    Anal pain 05/12/2015    Irritable bowel  syndrome 11/17/2011    Allergic rhinitis 05/24/1996       Past Medical History:   Diagnosis Date    Arthropathy     Asthma 02/13/2023    inhalers daily and prn    Chickenpox     Chronic fatigue     Chronic pain 02/13/2023    back, buttocks    Disorder of thyroid 02/13/2023    medicated    Fatty liver     GERD (gastroesophageal reflux disease)     Heart burn 02/13/2023    medicated    Hypoplasia of mandible     IBS (irritable bowel syndrome) 02/13/2023    medicated    Maxillary hypoplasia     Sleep apnea 02/13/2023    positive JAYESH, but is unable to use CPAP        Past Surgical History:   Procedure Laterality Date    [place] OPEN IMPLTJ HPGLSL NRV NSTIM RA PG&RESPIR SENSOR Right 3/6/2023    Procedure: INSERTION OF HYPOGLOSSAL NERVE NEUROSTIMULATOR ELECTRODE AND GENERATOR AND BREATHING SENSOR ELECTRODE RIGHT;  Surgeon: Pedro Wen M.D.;  Location: SURGERY SAME DAY [facility];  Service: Ent    OTHER  02/13/2023 11/2021 septoplasty    OTHER  02/13/2023 2021 Hard palate expander placed    OTHER ABDOMINAL SURGERY         Family History   Problem Relation Age of Onset    Colorectal Cancer Paternal Uncle         after age 50       Social History     Socioeconomic History    Marital status:      Spouse name: Not on file    Number of children: Not on file    Years of education: Not on file    Highest education level: Not on file   Occupational History    Not on file   Tobacco Use    Smoking status: Never    Smokeless tobacco: Never   Vaping Use    Vaping Use: Never used   Substance and Sexual Activity    Alcohol use: Yes     Comment: very rarely    Drug use: Never    Sexual activity: Yes     Partners: Female     Birth control/protection: None   Other Topics Concern    Not on file   Social History Narrative    Not on file     Social Determinants of Health     Financial Resource Strain: Not on file   Food Insecurity: Not on file   Transportation Needs: Not on file   Physical Activity: Not on file   Stress: Not on file  "  Social Connections: Not on file   Intimate Partner Violence: Not on file   Housing Stability: Not on file       Current Outpatient Medications   Medication Sig Dispense Refill    Eszopiclone 3 MG Tab Take 1 Tablet by mouth every evening for 30 days. 30 Tablet 0    budesonide-formoterol (SYMBICORT) 160-4.5 MCG/ACT Aerosol Inhale 2 puffs by mouth twice daily 30.6 g 11    Armodafinil 250 MG Tab Take 1 tablet by mouth every day for 90 days. 30 Tablet 2    levothyroxine (SYNTHROID) 25 MCG Tab Take 1 Tablet by mouth every morning on an empty stomach. 90 Tablet 1    VITAMIN D PO Take  by mouth every day.      B Complex Vitamins (VITAMIN B COMPLEX PO) Take  by mouth every day.      albuterol 108 (90 Base) MCG/ACT Aero Soln inhalation aerosol Inhale 2 Puffs every four hours as needed for Shortness of Breath. 8.5 g 3    pantoprazole (PROTONIX) 40 MG Tablet Delayed Response Take 1 Tablet by mouth every day. 90 Tablet 3    albuterol (PROVENTIL) 2.5mg/0.5ml Nebu Soln Take 2.5 mg by nebulization every four hours as needed for Shortness of Breath.      ondansetron (ZOFRAN ODT) 4 MG TABLET DISPERSIBLE Take 4 mg by mouth every 6 hours as needed for Nausea.      cholestyramine (QUESTRAN) 4 GM/DOSE powder MIX 1 SCOOP FULL in liquid and drink TWICE A DAY (PT PREFERS CANS NDC 0972-8896-72) 2268 g 2     No current facility-administered medications for this visit.        ALLERGIES: Patient has no known allergies.    ROS  Constitutional: Denies fevers, Denies weight changes  Ears/Nose/Throat/Mouth: Denies nasal congestion or sore throat   Cardiovascular: Denies chest pain  Respiratory: Denies shortness of breath, Denies cough  Gastrointestinal/Hepatic: Denies nausea, vomiting  Sleep: see HPI      PHYSICAL EXAM  /86 (BP Location: Left arm, Patient Position: Sitting, BP Cuff Size: Large adult)   Pulse (!) 105   Resp 16   Ht 1.88 m (6' 2\")   Wt 98.4 kg (217 lb)   SpO2 98%   BMI 27.86 kg/m²   Appearance: Well-nourished, " well-developed, no acute distress  Eyes:  No scleral icterus , EOMI  Musculoskeletal:  Grossly normal; gait and station normal; digits and nails normal  Skin:  No rashes, petechiae, cyanosis  Neurologic: without focal signs; oriented to person, time, place, and purpose; judgement intact      Medical Decision Making   Assessment and Plan  Abel Webster is a 43 y.o.male  with MDD, GERD, asthma, chronic insomnia, and obstructive sleep apnea status post hypoglossal nerve stimulator.  Presents today to follow-up regarding management of obstructive sleep apnea.    The medical record was reviewed.    Obstructive sleep apnea  Discussed management of obstructive sleep apnea.  Patient is having difficulty using his inspire device at his current settings.  Patient's sensitivity to is at a very low threshold.  Suspect this is making him have difficulty with using the device more regularly.  Discussed importance of regular usage.  Advised even if he has to go to level 1 to use it consistently each sleep.  This to be recommended over not using it at all.  Suspect with continued use he will develop some tolerance to his stimulation which may help control for sleep apnea.  Discussed options of further evaluating efficacy of his device.  Given his difficulty with using it regularly and having minimal thresholds if he continues have difficulty patient may benefit from undergoing an awake endoscopy.  This to be done through ENT.  Discussed benefit of awake endoscopy versus in lab sleep study.    Adjustments were made at today's visit which patient found to be tolerable.    PLAN:   -Use inspire device nightly  -Patient may stay at this level for the coming weeks and increase slowly  -Advised patient to reach out in 1 week with update on his ability to use the device  -If unable to tolerate device and current settings patient would benefit from undergoing awake endoscopy  -Advised to reach out via [facility] with questions      Patients with JAYESH are at increased risk of cardiovascular disease including coronary artery disease, systemic arterial hypertension, pulmonary arterial hypertension, cardiac arrythmias, and stroke. The patient was advised to avoid driving a motor vehicle when drowsy.    Chronic insomnia  Patient continues to awaken multiple times at night and feel as though he is not getting restorative sleep.  This been happening for many years.  Discussed that his insomnia symptoms appear to be more of a concern than his sleep apnea symptoms.  Majority of patient's complaints appear to be focused more towards quality of sleep and ability to stay asleep with or without the device.  He has tried hydroxyzine in the past with little benefit.  Discussed importance of having same schedule all week long.  Discussed cognitive behavioral therapy for insomnia.    Given his difficulty with using the device along with having poor quality of sleep discussed the sleep aid may be reasonable to try in the short-term to see if he can allow for a deeper sleep and regular usage of his inspire device.  Patient is open to starting a sleep aid.  We will plan to start Lunesta 3 mg.    Plan  -Start Lunesta 3 mg  -Recommend reaching out in 7 days regarding if there is any benefit to using Lunesta  -Patient may benefit from cognitive behavioral therapy for insomnia.  Would like to make sure sleep apnea is not adding to his insomnia prior to referring to cognitive behavioral therapy.    Have advised the patient to follow up with the appropriate healthcare practitioners for all other medical problems and issues.        Return in about 2 months (around 12/3/2023).      Total time care for the patient this date was 50 minutes. Time spent includes chart review, lab review, discussion with myself. This time was spent separate from device analysis /programming on this date.        Please note portions of this record was created using voice recognition  software. I have made every reasonable attempt to correct obvious errors, but I expect that there are errors of grammar and possibly content I did not discover before finalizing the note.

## 2023-10-04 ENCOUNTER — APPOINTMENT (OUTPATIENT)
Dept: SLEEP MEDICINE | Facility: MEDICAL CENTER | Age: 43
End: 2023-10-04
Attending: STUDENT IN AN ORGANIZED HEALTH CARE EDUCATION/TRAINING PROGRAM
Payer: COMMERCIAL

## 2023-10-10 PROCEDURE — RXMED WILLOW AMBULATORY MEDICATION CHARGE: Performed by: STUDENT IN AN ORGANIZED HEALTH CARE EDUCATION/TRAINING PROGRAM

## 2023-10-17 ENCOUNTER — PHARMACY VISIT (OUTPATIENT)
Dept: PHARMACY | Facility: MEDICAL CENTER | Age: 43
End: 2023-10-17
Payer: COMMERCIAL

## 2023-11-09 DIAGNOSIS — K21.9 GASTROESOPHAGEAL REFLUX DISEASE, UNSPECIFIED WHETHER ESOPHAGITIS PRESENT: ICD-10-CM

## 2023-11-09 PROCEDURE — RXMED WILLOW AMBULATORY MEDICATION CHARGE: Performed by: INTERNAL MEDICINE

## 2023-11-10 ENCOUNTER — PHARMACY VISIT (OUTPATIENT)
Dept: PHARMACY | Facility: MEDICAL CENTER | Age: 43
End: 2023-11-10
Payer: COMMERCIAL

## 2023-11-10 PROCEDURE — RXMED WILLOW AMBULATORY MEDICATION CHARGE: Performed by: INTERNAL MEDICINE

## 2023-11-10 RX ORDER — PANTOPRAZOLE SODIUM 40 MG/1
40 TABLET, DELAYED RELEASE ORAL DAILY
Qty: 90 TABLET | Refills: 3 | Status: SHIPPED | OUTPATIENT
Start: 2023-11-10

## 2023-11-14 ENCOUNTER — PHARMACY VISIT (OUTPATIENT)
Dept: PHARMACY | Facility: MEDICAL CENTER | Age: 43
End: 2023-11-14
Payer: COMMERCIAL

## 2023-11-18 ENCOUNTER — PHARMACY VISIT (OUTPATIENT)
Dept: PHARMACY | Facility: MEDICAL CENTER | Age: 43
End: 2023-11-18
Payer: COMMERCIAL

## 2023-11-18 PROCEDURE — RXMED WILLOW AMBULATORY MEDICATION CHARGE: Performed by: INTERNAL MEDICINE

## 2023-11-18 PROCEDURE — RXOTC WILLOW AMBULATORY OTC CHARGE

## 2023-11-30 ENCOUNTER — OFFICE VISIT (OUTPATIENT)
Dept: SLEEP MEDICINE | Facility: MEDICAL CENTER | Age: 43
End: 2023-11-30
Attending: STUDENT IN AN ORGANIZED HEALTH CARE EDUCATION/TRAINING PROGRAM
Payer: COMMERCIAL

## 2023-11-30 VITALS
HEART RATE: 125 BPM | WEIGHT: 216 LBS | BODY MASS INDEX: 27.72 KG/M2 | DIASTOLIC BLOOD PRESSURE: 78 MMHG | SYSTOLIC BLOOD PRESSURE: 128 MMHG | OXYGEN SATURATION: 97 % | RESPIRATION RATE: 16 BRPM | HEIGHT: 74 IN

## 2023-11-30 DIAGNOSIS — G47.33 OSA (OBSTRUCTIVE SLEEP APNEA): ICD-10-CM

## 2023-11-30 DIAGNOSIS — Z96.82 S/P INSERTION OF HYPOGLOSSAL NERVE STIMULATOR: ICD-10-CM

## 2023-11-30 DIAGNOSIS — F51.04 CHRONIC INSOMNIA: ICD-10-CM

## 2023-11-30 DIAGNOSIS — G47.21 DELAYED SLEEP PHASE SYNDROME: ICD-10-CM

## 2023-11-30 PROCEDURE — 3078F DIAST BP <80 MM HG: CPT | Performed by: STUDENT IN AN ORGANIZED HEALTH CARE EDUCATION/TRAINING PROGRAM

## 2023-11-30 PROCEDURE — 99212 OFFICE O/P EST SF 10 MIN: CPT | Performed by: STUDENT IN AN ORGANIZED HEALTH CARE EDUCATION/TRAINING PROGRAM

## 2023-11-30 PROCEDURE — 99214 OFFICE O/P EST MOD 30 MIN: CPT | Mod: 25 | Performed by: STUDENT IN AN ORGANIZED HEALTH CARE EDUCATION/TRAINING PROGRAM

## 2023-11-30 PROCEDURE — RXMED WILLOW AMBULATORY MEDICATION CHARGE: Performed by: STUDENT IN AN ORGANIZED HEALTH CARE EDUCATION/TRAINING PROGRAM

## 2023-11-30 PROCEDURE — 95970 ALYS NPGT W/O PRGRMG: CPT | Performed by: STUDENT IN AN ORGANIZED HEALTH CARE EDUCATION/TRAINING PROGRAM

## 2023-11-30 PROCEDURE — 3074F SYST BP LT 130 MM HG: CPT | Performed by: STUDENT IN AN ORGANIZED HEALTH CARE EDUCATION/TRAINING PROGRAM

## 2023-11-30 ASSESSMENT — FIBROSIS 4 INDEX: FIB4 SCORE: 0.73

## 2023-11-30 NOTE — PROGRESS NOTES
[facility] Follow-up Visit    CC: Follow-up regarding management of obstructive sleep apnea, insomnia, delayed sleep phase      HPI:  Abel Webster is a 43 y.o.male  with GERD, asthma, MDD, chronic insomnia, excessive daytime sleepiness, delayed sleep phase, and obstructive sleep apnea status post hypoglossal nerve stimulator.  Presents today to follow-up regarding management of obstructive sleep apnea, insomnia and delayed sleep phase.    Reports since last visit he did try Lunesta.  He stated with 1 mg he may be noticed some benefit but it was not substantial.  As increased to 2 mg and 3 mg he felt as though he was significantly sedated the next day.  He found this to be significant.  He therefore stopped taking the medication.  He did fine with use of Lunesta that he was able to tolerate a higher level on his inspire device and without it.  He states he continues to feel as though is not getting a deep sleep.    In regards to the inspire device he has been using more regularly than previous.  He states he can tolerate level 2 but has difficulty going up to level 3.    He continues to have difficulty with daytime tiredness and the ability to get a deep sleep and more continuously.  He has tried multiple medications in the past including Lunesta, and diazepam's, and believes he tried gabapentin.  There was evidence on his previous in lab study from September 2022 with decreased and N3 sleep.  He has not participated in CBT-I.    He continues to sleep during the day and be awake at night.  His sleep schedule can vary.  He does have periods where he will sleep 12 hours or more day.  He is taking armodafinil.  He states he does not take armodafinil every day but it does allow him to feel more awake.  He has tried Provigil in the past as well.    He had questions regarding genetic testing for insomnia and trouble with sleep.  He is wondering if there is any way to try to determine the cause of his  insomnia related to lab testing or imaging or genetic testing.    Interrogation of hypoglossal nerve stimulator  Usage data from device shows decreased usage compared to goal but increased usage surpassed.  In the last 90 days patient has used it 44% greater than 4 hours.  On nights that he has used it is used it about 6 hours 22 minutes.  He did increase the voltage and then decrease the voltage.    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    Hypertrophy, nasal, turbinate 07/03/2019    Fatty liver 11/07/2018    Migraines 11/07/2018    Asthma 11/07/2018    Jaw pain 07/18/2017    Hemorrhoids 03/15/2017    Gastroesophageal reflux disease 03/14/2017    Major depressive disorder, recurrent episode (HCC) 08/31/2016    Arthropathy of lumbar facet joint 07/12/2016    Chronic low back pain 02/14/2016    Obstructive sleep apnea 09/09/2015    Plantar fasciitis, bilateral 08/19/2015    Anal pain 05/12/2015    Irritable bowel syndrome 11/17/2011    Allergic rhinitis 05/24/1996       Past Medical History:   Diagnosis Date    Arthropathy     Asthma 02/13/2023    inhalers daily and prn    Chickenpox     Chronic fatigue     Chronic pain 02/13/2023    back, buttocks    Disorder of thyroid 02/13/2023    medicated    Fatty liver     GERD (gastroesophageal reflux disease)     Heart burn 02/13/2023    medicated    Hypoplasia of mandible     IBS (irritable bowel syndrome) 02/13/2023    medicated    Maxillary hypoplasia      Sleep apnea 02/13/2023    positive JAYESH, but is unable to use CPAP        Past Surgical History:   Procedure Laterality Date    [place] OPEN IMPLTJ HPGLSL NRV NSTIM RA PG&RESPIR SENSOR Right 3/6/2023    Procedure: INSERTION OF HYPOGLOSSAL NERVE NEUROSTIMULATOR ELECTRODE AND GENERATOR AND BREATHING SENSOR ELECTRODE RIGHT;  Surgeon: Pedro Wen M.D.;  Location: SURGERY SAME DAY [facility];  Service: Ent    OTHER  02/13/2023 11/2021 septoplasty    OTHER  02/13/2023 2021 Hard palate expander placed    OTHER ABDOMINAL SURGERY         Family History   Problem Relation Age of Onset    Colorectal Cancer Paternal Uncle         after age 50       Social History     Socioeconomic History    Marital status:      Spouse name: Not on file    Number of children: Not on file    Years of education: Not on file    Highest education level: Not on file   Occupational History    Not on file   Tobacco Use    Smoking status: Never    Smokeless tobacco: Never   Vaping Use    Vaping Use: Never used   Substance and Sexual Activity    Alcohol use: Yes     Comment: very rarely    Drug use: Never    Sexual activity: Yes     Partners: Female     Birth control/protection: None   Other Topics Concern    Not on file   Social History Narrative    Not on file     Social Determinants of Health     Financial Resource Strain: Not on file   Food Insecurity: Not on file   Transportation Needs: Not on file   Physical Activity: Not on file   Stress: Not on file   Social Connections: Not on file   Intimate Partner Violence: Not on file   Housing Stability: Not on file       Current Outpatient Medications   Medication Sig Dispense Refill    Lemborexant 5 MG Tab Take 5 mg by mouth at bedtime as needed (insomnia) for up to 30 days. 30 Tablet 0    pantoprazole (PROTONIX) 40 MG Tablet Delayed Response Take 1 Tablet by mouth every day. 90 Tablet 3    budesonide-formoterol (SYMBICORT) 160-4.5 MCG/ACT Aerosol Inhale 2 puffs by mouth twice daily 30.6 g 11     "levothyroxine (SYNTHROID) 25 MCG Tab Take 1 Tablet by mouth every morning on an empty stomach. 90 Tablet 1    VITAMIN D PO Take  by mouth every day.      B Complex Vitamins (VITAMIN B COMPLEX PO) Take  by mouth every day.      albuterol 108 (90 Base) MCG/ACT Aero Soln inhalation aerosol Inhale 2 Puffs every four hours as needed for Shortness of Breath. 8.5 g 3    albuterol (PROVENTIL) 2.5mg/0.5ml Nebu Soln Take 2.5 mg by nebulization every four hours as needed for Shortness of Breath.      ondansetron (ZOFRAN ODT) 4 MG TABLET DISPERSIBLE Take 4 mg by mouth every 6 hours as needed for Nausea.      cholestyramine (QUESTRAN) 4 GM/DOSE powder MIX 1 SCOOP FULL in liquid and drink TWICE A DAY (PT PREFERS CANS NDC 8372-0645-50) 7662 g 2    Armodafinil 250 MG Tab Take 1 tablet by mouth every day for 90 days. 30 Tablet 2     No current facility-administered medications for this visit.        ALLERGIES: Patient has no known allergies.    ROS  Constitutional: Denies fevers, Denies weight changes  Ears/Nose/Throat/Mouth: Denies nasal congestion or sore throat   Cardiovascular: Denies chest pain  Respiratory: Denies shortness of breath, Denies cough  Gastrointestinal/Hepatic: Denies nausea, vomiting  Sleep: see HPI      PHYSICAL EXAM  /78 (BP Location: Left arm, Patient Position: Sitting, BP Cuff Size: Adult)   Pulse (!) 125   Resp 16   Ht 1.88 m (6' 2\")   Wt 98 kg (216 lb)   SpO2 97%   BMI 27.73 kg/m²   Appearance: Well-nourished, well-developed, no acute distress  Eyes:  No scleral icterus , EOMI  Musculoskeletal:  Grossly normal; gait and station normal; digits and nails normal  Skin:  No rashes, petechiae, cyanosis  Neurologic: without focal signs; oriented to person, time, place, and purpose; judgement intact      Medical Decision Making   Assessment and Plan  Abel Webster is a 43 y.o.male  with GERD, asthma, MDD, chronic insomnia, excessive daytime sleepiness, delayed sleep phase, and obstructive sleep " apnea status post hypoglossal nerve stimulator.  Presents today to follow-up regarding management of obstructive sleep apnea, insomnia and delayed sleep phase.    The medical record was reviewed.    Obstructive sleep apnea  Patient continues to have difficulty using his inspire device regularly due to light sleep and it waking him up at night.  He does find a setting comfortable currently.  He feels that his level 2 he can use the device fairly regularly.  He was trying to increase his statin with increasing dosage of Lunesta.    Advised to try to use his device at the comfortable level for as long as possible.  Would recommend staying at level 2 for 2 weeks with regular nightly usage.  That he may try to increase.    He does have daytime sleepiness which may be due to untreated sleep apnea.  He is currently on stimulant armodafinil 250 mg.  Discussed potential benefit of switching to [place] as this may allow for deeper sleep if he feels the armodafinil is impacting his ability to get a deep sleep.    PLAN:   -Continue inspire device nightly  -Advised to reach out via MyChart with questions     Patients with JAYESH are at increased risk of cardiovascular disease including coronary artery disease, systemic arterial hypertension, pulmonary arterial hypertension, cardiac arrythmias, and stroke. The patient was advised to avoid driving a motor vehicle when drowsy.    Chronic insomnia  He continues have difficulty maintaining sleep.  He feels as though he is always in a light sleeper and has multiple awakenings during the day.  The Lunesta caused him to feel too sedated the next day.  Reviewed CBT-I again.  Advised that he may benefit from participating in cognitive behavioral therapy for insomnia.  He is open to this.    He is hopeful that he can try medication to see if it would help him sleep more effectively.  Discussed different medications options and that medication is second line for insomnia.  Reviewed that he may  try gabapentin to see if we can increase the amount of N3 sleep he is getting at night.  He states he believes he has tried gabapentin sometime in the past which it did not agree with him.  Reviewed Dayvigo.  Discussed that he may consider trying Dayvigo to help with sleep at night to see if he notices benefit.    Recommended he also participate in cognitive behavioral therapy for insomnia.  Advised that there is Dr. Ramirez by [facility] who performed CBT-I.  She may also help with his delayed sleep phase.  He often stays awake at night and sleeps during the day.    Plan  -Prescribed Dayvigo 5 mg, instructed to take 5 mg for a week and see if he notices any benefit if he does not may increase to 10 mg.  Advised not to go above 10 mg.  -Referral placed for cognitive behavioral therapy for insomnia.      Have advised the patient to follow up with the appropriate healthcare practitioners for all other medical problems and issues.    Return in about 2 months (around 1/30/2024).    Total time care for the patient this date was 55 minutes. Time spent includes chart review, lab review, discussion with myself. This time was spent separate from device analysis /programming on this date.        Please note portions of this record was created using voice recognition software. I have made every reasonable attempt to correct obvious errors, but I expect that there are errors of grammar and possibly content I did not discover before finalizing the note.

## 2023-12-06 ENCOUNTER — PHARMACY VISIT (OUTPATIENT)
Dept: PHARMACY | Facility: MEDICAL CENTER | Age: 43
End: 2023-12-06
Payer: COMMERCIAL

## 2023-12-08 ENCOUNTER — APPOINTMENT (OUTPATIENT)
Dept: MEDICAL GROUP | Facility: MEDICAL CENTER | Age: 43
End: 2023-12-08
Payer: COMMERCIAL

## 2023-12-11 ENCOUNTER — HOSPITAL ENCOUNTER (OUTPATIENT)
Dept: LAB | Facility: MEDICAL CENTER | Age: 43
End: 2023-12-11
Attending: INTERNAL MEDICINE
Payer: COMMERCIAL

## 2023-12-11 DIAGNOSIS — E03.8 SUBCLINICAL HYPOTHYROIDISM: ICD-10-CM

## 2023-12-11 DIAGNOSIS — Z11.59 NEED FOR HEPATITIS C SCREENING TEST: ICD-10-CM

## 2023-12-11 DIAGNOSIS — E55.9 VITAMIN D DEFICIENCY: ICD-10-CM

## 2023-12-11 DIAGNOSIS — R73.01 IFG (IMPAIRED FASTING GLUCOSE): ICD-10-CM

## 2023-12-11 DIAGNOSIS — E78.00 PURE HYPERCHOLESTEROLEMIA: ICD-10-CM

## 2023-12-11 LAB
ALBUMIN SERPL BCP-MCNC: 4.7 G/DL (ref 3.2–4.9)
ALBUMIN/GLOB SERPL: 1.5 G/DL
ALP SERPL-CCNC: 67 U/L (ref 30–99)
ALT SERPL-CCNC: 71 U/L (ref 2–50)
ANION GAP SERPL CALC-SCNC: 11 MMOL/L (ref 7–16)
AST SERPL-CCNC: 34 U/L (ref 12–45)
BILIRUB SERPL-MCNC: 1.2 MG/DL (ref 0.1–1.5)
BUN SERPL-MCNC: 14 MG/DL (ref 8–22)
CALCIUM ALBUM COR SERPL-MCNC: 9.1 MG/DL (ref 8.5–10.5)
CALCIUM SERPL-MCNC: 9.7 MG/DL (ref 8.5–10.5)
CHLORIDE SERPL-SCNC: 104 MMOL/L (ref 96–112)
CHOLEST SERPL-MCNC: 210 MG/DL (ref 100–199)
CO2 SERPL-SCNC: 25 MMOL/L (ref 20–33)
CREAT SERPL-MCNC: 0.97 MG/DL (ref 0.5–1.4)
EST. AVERAGE GLUCOSE BLD GHB EST-MCNC: 117 MG/DL
FASTING STATUS PATIENT QL REPORTED: NORMAL
GFR SERPLBLD CREATININE-BSD FMLA CKD-EPI: 99 ML/MIN/1.73 M 2
GLOBULIN SER CALC-MCNC: 3.1 G/DL (ref 1.9–3.5)
GLUCOSE SERPL-MCNC: 103 MG/DL (ref 65–99)
HBA1C MFR BLD: 5.7 % (ref 4–5.6)
HDLC SERPL-MCNC: 41 MG/DL
LDLC SERPL CALC-MCNC: 127 MG/DL
POTASSIUM SERPL-SCNC: 4.6 MMOL/L (ref 3.6–5.5)
PROT SERPL-MCNC: 7.8 G/DL (ref 6–8.2)
SODIUM SERPL-SCNC: 140 MMOL/L (ref 135–145)
TRIGL SERPL-MCNC: 209 MG/DL (ref 0–149)

## 2023-12-11 PROCEDURE — 82306 VITAMIN D 25 HYDROXY: CPT

## 2023-12-11 PROCEDURE — 84443 ASSAY THYROID STIM HORMONE: CPT

## 2023-12-11 PROCEDURE — 86803 HEPATITIS C AB TEST: CPT

## 2023-12-11 PROCEDURE — 80053 COMPREHEN METABOLIC PANEL: CPT

## 2023-12-11 PROCEDURE — 83036 HEMOGLOBIN GLYCOSYLATED A1C: CPT

## 2023-12-11 PROCEDURE — 36415 COLL VENOUS BLD VENIPUNCTURE: CPT

## 2023-12-11 PROCEDURE — 80061 LIPID PANEL: CPT

## 2023-12-12 LAB
25(OH)D3 SERPL-MCNC: 25 NG/ML (ref 30–100)
HCV AB SER QL: NORMAL
TSH SERPL DL<=0.005 MIU/L-ACNC: 3.45 UIU/ML (ref 0.38–5.33)

## 2023-12-13 ENCOUNTER — PHARMACY VISIT (OUTPATIENT)
Dept: PHARMACY | Facility: MEDICAL CENTER | Age: 43
End: 2023-12-13
Payer: COMMERCIAL

## 2023-12-13 ENCOUNTER — TELEMEDICINE (OUTPATIENT)
Dept: MEDICAL GROUP | Facility: MEDICAL CENTER | Age: 43
End: 2023-12-13
Payer: COMMERCIAL

## 2023-12-13 VITALS — BODY MASS INDEX: 25.67 KG/M2 | HEIGHT: 74 IN | WEIGHT: 200 LBS

## 2023-12-13 DIAGNOSIS — G47.33 OBSTRUCTIVE SLEEP APNEA: ICD-10-CM

## 2023-12-13 DIAGNOSIS — E55.9 VITAMIN D DEFICIENCY: ICD-10-CM

## 2023-12-13 DIAGNOSIS — R74.01 ALT (SGPT) LEVEL RAISED: ICD-10-CM

## 2023-12-13 DIAGNOSIS — E78.00 PURE HYPERCHOLESTEROLEMIA: ICD-10-CM

## 2023-12-13 DIAGNOSIS — R73.01 IFG (IMPAIRED FASTING GLUCOSE): ICD-10-CM

## 2023-12-13 DIAGNOSIS — R05.3 CHRONIC COUGH: ICD-10-CM

## 2023-12-13 DIAGNOSIS — E03.8 SUBCLINICAL HYPOTHYROIDISM: ICD-10-CM

## 2023-12-13 DIAGNOSIS — R53.82 CHRONIC FATIGUE: ICD-10-CM

## 2023-12-13 DIAGNOSIS — J45.40 MODERATE PERSISTENT ASTHMA WITHOUT COMPLICATION: ICD-10-CM

## 2023-12-13 PROCEDURE — RXMED WILLOW AMBULATORY MEDICATION CHARGE: Performed by: INTERNAL MEDICINE

## 2023-12-13 PROCEDURE — 99214 OFFICE O/P EST MOD 30 MIN: CPT | Mod: 95 | Performed by: INTERNAL MEDICINE

## 2023-12-13 RX ORDER — ARMODAFINIL 250 MG/1
250 TABLET ORAL DAILY
Qty: 90 TABLET | Refills: 0 | Status: SHIPPED | OUTPATIENT
Start: 2023-12-13 | End: 2024-03-14

## 2023-12-13 RX ORDER — ERGOCALCIFEROL 1.25 MG/1
50000 CAPSULE ORAL
Qty: 12 CAPSULE | Refills: 0 | Status: SHIPPED | OUTPATIENT
Start: 2023-12-13

## 2023-12-13 ASSESSMENT — FIBROSIS 4 INDEX: FIB4 SCORE: 0.7

## 2023-12-13 NOTE — PROGRESS NOTES
Virtual Visit: Established Patient   This visit was conducted via Zoom using secure and encrypted videoconferencing technology. The patient was in a private location in the state of [place].    The patient's identity was confirmed and verbal consent was obtained for this virtual visit.    Subjective:   CC:   Chief Complaint   Patient presents with    Lab Results    Follow-Up    Medication Follow-up       Abel Webster is a 43 y.o. male presenting for evaluation and management of above:    Inspire device is working well but still struggling with sleep.  Sleep medicine is working with him to get proper sleep, planning CBT I. It's frustrating for him to wait for long with these appointments, but he is doing what he can.     Because of insomnia, he is still having low energy level which affects his mobility and healthy lifestyle.        ROS    As above    No Known Allergies    Current medicines (including changes today)  Current Outpatient Medications   Medication Sig Dispense Refill    ergocalciferol (DRISDOL) 03312 UNIT capsule Take 1 Capsule by mouth every 7 days. 12 Capsule 0    Lemborexant 5 MG Tab Take 5 mg by mouth at bedtime as needed (insomnia) for up to 30 days. 30 Tablet 0    pantoprazole (PROTONIX) 40 MG Tablet Delayed Response Take 1 Tablet by mouth every day. 90 Tablet 3    budesonide-formoterol (SYMBICORT) 160-4.5 MCG/ACT Aerosol Inhale 2 puffs by mouth twice daily 30.6 g 11    levothyroxine (SYNTHROID) 25 MCG Tab Take 1 Tablet by mouth every morning on an empty stomach. 90 Tablet 1    VITAMIN D PO Take  by mouth every day.      B Complex Vitamins (VITAMIN B COMPLEX PO) Take  by mouth every day.      albuterol 108 (90 Base) MCG/ACT Aero Soln inhalation aerosol Inhale 2 Puffs every four hours as needed for Shortness of Breath. 8.5 g 3    albuterol (PROVENTIL) 2.5mg/0.5ml Nebu Soln Take 2.5 mg by nebulization every four hours as needed for Shortness of Breath.      ondansetron (ZOFRAN ODT) 4 MG TABLET  "DISPERSIBLE Take 4 mg by mouth every 6 hours as needed for Nausea.      cholestyramine (QUESTRAN) 4 GM/DOSE powder MIX 1 SCOOP FULL in liquid and drink TWICE A DAY (PT PREFERS [facility] N[facility] 8853-2041-56) 2268 g 2     No current facility-administered medications for this visit.       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    Hypertrophy, nasal, turbinate 07/03/2019    Fatty liver 11/07/2018    Migraines 11/07/2018    Asthma 11/07/2018    Jaw pain 07/18/2017    Hemorrhoids 03/15/2017    Gastroesophageal reflux disease 03/14/2017    Major depressive disorder, recurrent episode (HCC) 08/31/2016    Arthropathy of lumbar facet joint 07/12/2016    Chronic low back pain 02/14/2016    Obstructive sleep apnea 09/09/2015    Plantar fasciitis, bilateral 08/19/2015    Anal pain 05/12/2015    Irritable bowel syndrome 11/17/2011    Allergic rhinitis 05/24/1996          Objective:   Ht 1.88 m (6' 2\")   Wt 90.7 kg (200 lb) Comment: pt stated  BMI 25.68 kg/m²     Physical Exam:   Constitutional: Alert, no distress, well-groomed.  Skin: No rashes in visible areas.  Eye: Round. Conjunctiva clear, lids normal. No icterus.   ENMT: Lips pink without lesions, moist mucous membranes. Phonation normal.  Neck: No visible masses   Respiratory: Unlabored respiratory effort, no cough or audible wheeze  Psych: Alert and oriented x3, normal affect and mood.       Assessment and Plan:   The following treatment plan was discussed:     1. IFG (impaired fasting glucose)  Increasing slightly.  Still okay to manage with medication at this point.  Recheck in 6 months.  - HEMOGLOBIN A1C; Future    2. Subclinical hypothyroidism  Stable.  Continue current LT4 dose.    3. Pure " hypercholesterolemia  The 10-year ASCVD risk score (Lee GARCIA, et al., 2019) is: 2.3%  Continue lifestyle management and monitor with lab.  - Lipid Profile; Future  - Comp Metabolic Panel; Future    4. Vitamin D deficiency  Taking OTC 50 mcg daily.  Not adequate.  Take prescription dose for 3 months and go back to OTC dose.  - ergocalciferol (DRISDOL) 90793 UNIT capsule; Take 1 Capsule by mouth every 7 days.  Dispense: 12 Capsule; Refill: 0  - VITAMIN D,25 HYDROXY (DEFICIENCY); Future    5. Moderate persistent asthma without complication  6. Chronic cough  It is stable right now but he mentioned that he is having chronic cough, significant production of yellowish mucus from deep in the chest 8 months out of a year.  No significant nasal congestion or postnasal drip. It may be poor asthma control or additional diagnoses such as chronic bronchitis or bronchiectasis. We can consider HRCT with next flare.    7. ALT (SGPT) level raised  - CBC WITH DIFFERENTIAL; Future        Follow-up: Return in about 6 months (around 6/13/2024).

## 2023-12-14 ENCOUNTER — PHARMACY VISIT (OUTPATIENT)
Dept: PHARMACY | Facility: MEDICAL CENTER | Age: 43
End: 2023-12-14

## 2023-12-14 PROCEDURE — RXMED WILLOW AMBULATORY MEDICATION CHARGE: Performed by: INTERNAL MEDICINE

## 2023-12-14 NOTE — TELEPHONE ENCOUNTER
Received request via: Pharmacy    Was the patient seen in the last year in this department? Yes    Does the patient have an active prescription (recently filled or refills available) for medication(s) requested? No    Does the patient have [facility] Plus and need 100 day supply (blood pressure, diabetes and cholesterol meds only)? Yes    Requested Prescriptions     Pending Prescriptions Disp Refills    Armodafinil 250 MG Tab 30 Tablet 2     Sig: Take 1 tablet by mouth every day for 90 days.

## 2023-12-15 ENCOUNTER — PHARMACY VISIT (OUTPATIENT)
Dept: PHARMACY | Facility: MEDICAL CENTER | Age: 43
End: 2023-12-15
Payer: COMMERCIAL

## 2024-01-28 PROCEDURE — RXMED WILLOW AMBULATORY MEDICATION CHARGE: Performed by: INTERNAL MEDICINE

## 2024-01-31 ENCOUNTER — PHARMACY VISIT (OUTPATIENT)
Dept: PHARMACY | Facility: MEDICAL CENTER | Age: 44
End: 2024-01-31
Payer: COMMERCIAL

## 2024-02-13 ENCOUNTER — OFFICE VISIT (OUTPATIENT)
Dept: SLEEP MEDICINE | Facility: MEDICAL CENTER | Age: 44
End: 2024-02-13
Attending: STUDENT IN AN ORGANIZED HEALTH CARE EDUCATION/TRAINING PROGRAM
Payer: COMMERCIAL

## 2024-02-13 VITALS
OXYGEN SATURATION: 96 % | HEIGHT: 74 IN | RESPIRATION RATE: 16 BRPM | SYSTOLIC BLOOD PRESSURE: 118 MMHG | HEART RATE: 104 BPM | DIASTOLIC BLOOD PRESSURE: 72 MMHG | WEIGHT: 222.5 LBS | BODY MASS INDEX: 28.55 KG/M2

## 2024-02-13 DIAGNOSIS — Z96.82 S/P INSERTION OF HYPOGLOSSAL NERVE STIMULATOR: ICD-10-CM

## 2024-02-13 DIAGNOSIS — G47.33 OSA (OBSTRUCTIVE SLEEP APNEA): Primary | ICD-10-CM

## 2024-02-13 DIAGNOSIS — G47.21 DELAYED SLEEP PHASE SYNDROME: ICD-10-CM

## 2024-02-13 DIAGNOSIS — Z45.42 ENCOUNTER FOR ADJUSTMENT AND MANAGEMENT OF NEUROSTIMULATOR: ICD-10-CM

## 2024-02-13 DIAGNOSIS — F51.04 CHRONIC INSOMNIA: ICD-10-CM

## 2024-02-13 PROCEDURE — 99214 OFFICE O/P EST MOD 30 MIN: CPT | Mod: 25 | Performed by: STUDENT IN AN ORGANIZED HEALTH CARE EDUCATION/TRAINING PROGRAM

## 2024-02-13 PROCEDURE — 3074F SYST BP LT 130 MM HG: CPT | Performed by: STUDENT IN AN ORGANIZED HEALTH CARE EDUCATION/TRAINING PROGRAM

## 2024-02-13 PROCEDURE — 99213 OFFICE O/P EST LOW 20 MIN: CPT | Performed by: STUDENT IN AN ORGANIZED HEALTH CARE EDUCATION/TRAINING PROGRAM

## 2024-02-13 PROCEDURE — 3078F DIAST BP <80 MM HG: CPT | Performed by: STUDENT IN AN ORGANIZED HEALTH CARE EDUCATION/TRAINING PROGRAM

## 2024-02-13 PROCEDURE — 95977 ALYS CPLX CN NPGT PRGRMG: CPT | Performed by: STUDENT IN AN ORGANIZED HEALTH CARE EDUCATION/TRAINING PROGRAM

## 2024-02-13 ASSESSMENT — PATIENT HEALTH QUESTIONNAIRE - PHQ9: CLINICAL INTERPRETATION OF PHQ2 SCORE: 0

## 2024-02-13 ASSESSMENT — FIBROSIS 4 INDEX: FIB4 SCORE: 0.7

## 2024-02-13 NOTE — PROGRESS NOTES
[facility] Follow-up Visit    CC: Follow-up regarding management of obstructive sleep apnea and insomnia.      HPI:  Abel Webster is a 43 y.o.male  with GERD, asthma, MDD, chronic insomnia, delayed sleep phase, daytime sleepiness, and obstructive sleep apnea status post hypoglossal nerve stimulator.  Presents today to follow-up regarding management of obstructive sleep apnea and to discuss insomnia.    She reports since last visit he has been able to use his device more regularly.  He still feels that he cannot increase more on his device.  Feels there is a drastic difference between his current setting and increasing to another setting.  At his current setting he is able to tolerate it throughout the night.  He does feel his tongue moving slightly but feels it is not making a difference.  His wife reports he continues to snore in his sleep with the device.  He does sleep exclusively on his side.    He continues to have difficulty with getting a restful night sleep.  He continues to wake up after falling asleep.  He continues to feel as though he is not getting a deep sleep.  He is scheduled to see Dr. Ramirez next week regarding cognitive behavioral therapy for insomnia.  Patient has delayed sleep phase and insomnia which can benefit from cognitive behavioral therapy for insomnia.    Patient has tried Lunesta and Dayvigo.  He found Lunesta built up in his system more throughout the following day.  Regarding Dayvigo he felt it was not as beneficial to his sleep.    He is having hypnic jerks at times.  His wife reports no significant movement in his sleep.  She states he will twitch on occasion with his snoring.  Denies any purposeful movement in sleep.    Programming and interrogation of hypoglossal nerve stimulator  Stimulation was delivered to incoming voltage which elicited strong tongue protrusion with leftward deviation.  Decreasing voltage by 1 setting drastically decreased the amount of  protrusion.  Changed electrode configuration to minus plus minus.  Patient found this to be more comfortable.  He felt as though his tongue was protruding more midline.  In addition he was able to tolerate multiple setting changes in terms of voltage.  He felt that each setting change was very gradual which she preferred.  Voltages were tried between 0.6 up to 1.6 at this configuration.  Patient was able to tolerate up to 1.6 but found it to be stronger and slightly uncomfortable.  He preferred to start lower and increase at home.  As voltage is increased midline tongue protrusion was noted with protrusion over the lower margin of teeth.  Settings changes were done in supine position.    Incoming settings  1)Start Delay: 75 min  2)Pause Time: 30 min  3)Total run time: 11 hours  4) Minimum voltage 0.1 v   5) Maximum voltage 0.4v  6) Pulse configuration (off minus off)  7) Pulse Width  120 microseconds  8) Rate 33 Hz  9) Current voltage 0.4v    Outgoing (changes in bold)  1)Start Delay: 75 min  2)Pause Time: 30 min  3)Total run time: 11 hours  4) Minimum voltage 1.0 v   5) Maximum voltage 2.0v  6) Pulse configuration (- + -)  7) Pulse Width 90 microseconds  8) Rate 33 Hz  9) Current voltage 1.1v    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    Hypertrophy, nasal, turbinate 07/03/2019    Fatty liver 11/07/2018    Migraines 11/07/2018    Asthma 11/07/2018    Jaw pain 07/18/2017    Hemorrhoids 03/15/2017    Gastroesophageal reflux disease 03/14/2017    Major depressive disorder, recurrent episode (HCC) 08/31/2016    Arthropathy of lumbar facet joint 07/12/2016    Chronic low back pain 02/14/2016    Obstructive sleep apnea 09/09/2015    Plantar fasciitis,  bilateral 08/19/2015    Anal pain 05/12/2015    Irritable bowel syndrome 11/17/2011    Allergic rhinitis 05/24/1996       Past Medical History:   Diagnosis Date    Arthropathy     Asthma 02/13/2023    inhalers daily and prn    Chickenpox     Chronic fatigue     Chronic pain 02/13/2023    back, buttocks    Disorder of thyroid 02/13/2023    medicated    Fatty liver     GERD (gastroesophageal reflux disease)     Heart burn 02/13/2023    medicated    Hypoplasia of mandible     IBS (irritable bowel syndrome) 02/13/2023    medicated    Maxillary hypoplasia     Sleep apnea 02/13/2023    positive JAYESH, but is unable to use CPAP        Past Surgical History:   Procedure Laterality Date    SC OPEN IMPLTJ HPGLSL NRV NSTIM RA PG&RESPIR SENSOR Right 3/6/2023    Procedure: INSERTION OF HYPOGLOSSAL NERVE NEUROSTIMULATOR ELECTRODE AND GENERATOR AND BREATHING SENSOR ELECTRODE RIGHT;  Surgeon: Pedro Wen M.D.;  Location: SURGERY SAME DAY [facility] [place];  Service: Ent    OTHER  02/13/2023 11/2021 septoplasty    OTHER  02/13/2023 2021 Hard palate expander placed    OTHER ABDOMINAL SURGERY         Family History   Problem Relation Age of Onset    Colorectal Cancer Paternal Uncle         after age 50       Social History     Socioeconomic History    Marital status:      Spouse name: Not on file    Number of children: Not on file    Years of education: Not on file    Highest education level: Not on file   Occupational History    Not on file   Tobacco Use    Smoking status: Never    Smokeless tobacco: Never   Vaping Use    Vaping Use: Never used   Substance and Sexual Activity    Alcohol use: Yes     Comment: very rarely    Drug use: Never    Sexual activity: Yes     Partners: Female     Birth control/protection: None   Other Topics Concern    Not on file   Social History Narrative    Not on file     Social Determinants of Health     Financial Resource Strain: Not on file   Food Insecurity: Not on file   Transportation Needs:  "Not on file   Physical Activity: Not on file   Stress: Not on file   Social Connections: Not on file   Intimate Partner Violence: Not on file   Housing Stability: Not on file       Current Outpatient Medications   Medication Sig Dispense Refill    ergocalciferol (DRISDOL) 03842 UNIT capsule Take 1 Capsule by mouth every 7 days. 12 Capsule 0    Armodafinil 250 MG Tab Take 250 mg by mouth every day for 90 days. 90 Tablet 0    pantoprazole (PROTONIX) 40 MG Tablet Delayed Response Take 1 Tablet by mouth every day. 90 Tablet 3    budesonide-formoterol (SYMBICORT) 160-4.5 MCG/ACT Aerosol Inhale 2 puffs by mouth twice daily 30.6 g 11    levothyroxine (SYNTHROID) 25 MCG Tab Take 1 Tablet by mouth every morning on an empty stomach. 90 Tablet 1    VITAMIN D PO Take  by mouth every day.      B Complex Vitamins (VITAMIN B COMPLEX PO) Take  by mouth every day.      albuterol 108 (90 Base) MCG/ACT Aero Soln inhalation aerosol Inhale 2 Puffs every four hours as needed for Shortness of Breath. 8.5 g 3    albuterol (PROVENTIL) 2.5mg/0.5ml Nebu Soln Take 2.5 mg by nebulization every four hours as needed for Shortness of Breath.      ondansetron (ZOFRAN ODT) 4 MG TABLET DISPERSIBLE Take 4 mg by mouth every 6 hours as needed for Nausea.      cholestyramine (QUESTRAN) 4 GM/DOSE powder MIX 1 SCOOP FULL in liquid and drink TWICE A DAY (PT PREFERS [facility] NDC 2886-2580-26) 2268 g 2     No current facility-administered medications for this visit.        ALLERGIES: Patient has no known allergies.    ROS  Constitutional: Denies fevers, Denies weight changes  Ears/Nose/Throat/Mouth: Denies nasal congestion or sore throat   Cardiovascular: Denies chest pain  Respiratory: Denies shortness of breath, Denies cough  Gastrointestinal/Hepatic: Denies nausea, vomiting  Sleep: see HPI      PHYSICAL EXAM  /72 (BP Location: Left arm, Patient Position: Sitting, BP Cuff Size: Adult)   Pulse (!) 104   Resp 16   Ht 1.88 m (6' 2\")   Wt 101 kg (222 lb 8 " oz)   SpO2 96%   BMI 28.57 kg/m²   Appearance: Well-nourished, well-developed, no acute distress  Eyes:  No scleral icterus , EOMI  Musculoskeletal:  Grossly normal; gait and station normal; digits and nails normal  Skin:  No rashes, petechiae, cyanosis  Neurologic: without focal signs; oriented to person, time, place, and purpose; judgement intact      Medical Decision Making   Assessment and Plan  Abel Webster is a 43 y.o.male  with GERD, asthma, MDD, chronic insomnia, delayed sleep phase, daytime sleepiness, and obstructive sleep apnea status post hypoglossal nerve stimulator.  Presents today to follow-up regarding management of obstructive sleep apnea and to discuss insomnia.    The medical record was reviewed.    Obstructive sleep apnea  Patient is able to tolerate his inspire device more than previous.  Settings changes made at today's visit he found to be more comfortable than his previous settings.  Patient endorsed that he is excited to try the new settings as he feels it is more gradual.  Advance programming done at today's visit.  Encouraged regular usage.  Advised may increase by 1 setting every week or 2.  Recommended to continue to avoid side sleeping    PLAN:   -Continue hypoglossal nerve stimulator nightly  -Encouraged to increase by 1 setting every week to 2 weeks  -Advised to continue to avoid back sleeping  -Advance programming done at today's visit  -Advised to reach out via MyChart with questions     Patients with JAYESH are at increased risk of cardiovascular disease including coronary artery disease, systemic arterial hypertension, pulmonary arterial hypertension, cardiac arrythmias, and stroke. The patient was advised to avoid driving a motor vehicle when drowsy.    Chronic insomnia and delayed sleep phase  Encouraged to continue to eat try to keep the same bedtime and wake time.  Advised that he would benefit from cognitive behavioral therapy which she has an appointment for next week.   Patient continues to have difficulty maintaining sleep.  He continues to find that he is not getting a deep sleep.    Plan  -Keep regular bedtime and wake time throughout the whole week  -Keep upcoming appointment with Dr. Ramirez      Return in about 6 weeks (around 3/26/2024).    Total time care for the patient this date was 40 minutes. Time spent includes chart review, lab review, discussion with myself. This time was spent separate from device analysis /programming on this date.      Please note portions of this record was created using voice recognition software. I have made every reasonable attempt to correct obvious errors, but I expect that there are errors of grammar and possibly content I did not discover before finalizing the note.

## 2024-02-16 PROCEDURE — RXMED WILLOW AMBULATORY MEDICATION CHARGE: Performed by: INTERNAL MEDICINE

## 2024-02-27 ENCOUNTER — PHARMACY VISIT (OUTPATIENT)
Dept: PHARMACY | Facility: MEDICAL CENTER | Age: 44
End: 2024-02-27
Payer: COMMERCIAL

## 2024-02-27 DIAGNOSIS — E03.8 SUBCLINICAL HYPOTHYROIDISM: ICD-10-CM

## 2024-02-27 RX ORDER — LEVOTHYROXINE SODIUM 0.03 MG/1
25 TABLET ORAL
Qty: 90 TABLET | Refills: 1 | Status: CANCELLED | OUTPATIENT
Start: 2024-02-27

## 2024-02-28 DIAGNOSIS — E03.8 SUBCLINICAL HYPOTHYROIDISM: ICD-10-CM

## 2024-02-28 NOTE — TELEPHONE ENCOUNTER
Received request via: Patient    Was the patient seen in the last year in this department? Yes    Does the patient have an active prescription (recently filled or refills available) for medication(s) requested? No      Does the patient have long-term Plus and need 100 day supply (blood pressure, diabetes and cholesterol meds only)? Patient does not have SCP

## 2024-02-28 NOTE — TELEPHONE ENCOUNTER
Received request via: Pharmacy    Was the patient seen in the last year in this department? Yes    Does the patient have an active prescription (recently filled or refills available) for medication(s) requested? No        Does the patient have FDC Plus and need 100 day supply (blood pressure, diabetes and cholesterol meds only)? Patient does not have SCP

## 2024-02-29 RX ORDER — LEVOTHYROXINE SODIUM 0.03 MG/1
25 TABLET ORAL
Qty: 90 TABLET | Refills: 1 | Status: SHIPPED | OUTPATIENT
Start: 2024-02-29

## 2024-03-05 ENCOUNTER — PHARMACY VISIT (OUTPATIENT)
Dept: PHARMACY | Facility: MEDICAL CENTER | Age: 44
End: 2024-03-05
Payer: COMMERCIAL

## 2024-03-05 PROCEDURE — RXMED WILLOW AMBULATORY MEDICATION CHARGE: Performed by: INTERNAL MEDICINE

## 2024-03-28 ENCOUNTER — OFFICE VISIT (OUTPATIENT)
Dept: SLEEP MEDICINE | Facility: MEDICAL CENTER | Age: 44
End: 2024-03-28
Attending: STUDENT IN AN ORGANIZED HEALTH CARE EDUCATION/TRAINING PROGRAM
Payer: COMMERCIAL

## 2024-03-28 ENCOUNTER — PHARMACY VISIT (OUTPATIENT)
Dept: PHARMACY | Facility: MEDICAL CENTER | Age: 44
End: 2024-03-28
Payer: COMMERCIAL

## 2024-03-28 VITALS
WEIGHT: 225 LBS | DIASTOLIC BLOOD PRESSURE: 84 MMHG | HEART RATE: 102 BPM | SYSTOLIC BLOOD PRESSURE: 124 MMHG | RESPIRATION RATE: 16 BRPM | HEIGHT: 74 IN | OXYGEN SATURATION: 96 % | BODY MASS INDEX: 28.88 KG/M2

## 2024-03-28 DIAGNOSIS — Z96.82 S/P INSERTION OF HYPOGLOSSAL NERVE STIMULATOR: ICD-10-CM

## 2024-03-28 DIAGNOSIS — G47.33 OSA (OBSTRUCTIVE SLEEP APNEA): Primary | ICD-10-CM

## 2024-03-28 DIAGNOSIS — F51.04 CHRONIC INSOMNIA: ICD-10-CM

## 2024-03-28 DIAGNOSIS — G47.21 DELAYED SLEEP PHASE SYNDROME: ICD-10-CM

## 2024-03-28 PROCEDURE — 99213 OFFICE O/P EST LOW 20 MIN: CPT | Performed by: STUDENT IN AN ORGANIZED HEALTH CARE EDUCATION/TRAINING PROGRAM

## 2024-03-28 PROCEDURE — RXMED WILLOW AMBULATORY MEDICATION CHARGE: Performed by: STUDENT IN AN ORGANIZED HEALTH CARE EDUCATION/TRAINING PROGRAM

## 2024-03-28 PROCEDURE — 95970 ALYS NPGT W/O PRGRMG: CPT | Performed by: STUDENT IN AN ORGANIZED HEALTH CARE EDUCATION/TRAINING PROGRAM

## 2024-03-28 RX ORDER — TRAZODONE HYDROCHLORIDE 50 MG/1
50 TABLET ORAL NIGHTLY
Qty: 30 TABLET | Refills: 0 | Status: SHIPPED | OUTPATIENT
Start: 2024-03-28

## 2024-03-28 ASSESSMENT — FIBROSIS 4 INDEX: FIB4 SCORE: 0.7

## 2024-03-28 NOTE — PROGRESS NOTES
[facility] Follow-up Visit    CC: Follow-up regarding management of obstructive sleep apnea      HPI:  Abel Webster is a 43 y.o.male  with GERD, asthma, MDD, chronic insomnia, delayed sleep phase, daytime sleepiness, and obstructive sleep apnea status post hypoglossal nerve stimulator.  Presents today to follow-up regarding management of obstructive sleep apnea.    Since last visit he has continued to use his device regularly.  He reports he has tried to increase his settings but is unable to tolerate higher voltage settings as he feels that wakes him up at night.  He feels with his current voltage settings he is able to sleep through the night with use of his inspire device.  He states he is unaware if his inspire device is improving his sleep apnea.  He has not noticed any major symptomatic improvements in terms of daytime energy or sleep quality.  He reports he wants to undergo a in lab study to see if his sleep apnea has improved and we can focus more on his sleep schedule and sleep quality.    He did meet with Dr. Ramirez's office regarding his difficulty getting a restful night sleep.  He states that he was told by her that there was nothing that she can do for him at this time.  He states that they discussed potentially trying trazodone for his sleep.    Patient has previously tried Lunesta and Dayvigo without great benefit.  He is open to trying trazodone.    Interrogation of hypoglossal nerve stimulator  Stimulation was delivered to incoming voltage settings which elicited tongue protrusion.  Protrusion was midline and over the lower margin of teeth.  This was done in the supine position in the office.  Patient found stimulation to be tolerable.  He states when he goes up on his settings he finds it to be somewhat difficult to tolerate throughout the night.    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    Hypertrophy, nasal, turbinate 07/03/2019    Fatty liver 11/07/2018    Migraines 11/07/2018    Asthma 11/07/2018    Jaw pain 07/18/2017    Hemorrhoids 03/15/2017    Gastroesophageal reflux disease 03/14/2017    Major depressive disorder, recurrent episode (HCC) 08/31/2016    Arthropathy of lumbar facet joint 07/12/2016    Chronic low back pain 02/14/2016    Obstructive sleep apnea 09/09/2015    Plantar fasciitis, bilateral 08/19/2015    Anal pain 05/12/2015    Irritable bowel syndrome 11/17/2011    Allergic rhinitis 05/24/1996       Past Medical History:   Diagnosis Date    Arthropathy     Asthma 02/13/2023    inhalers daily and prn    Chickenpox     Chronic fatigue     Chronic pain 02/13/2023    back, buttocks    Disorder of thyroid 02/13/2023    medicated    Fatty liver     GERD (gastroesophageal reflux disease)     Heart burn 02/13/2023    medicated    Hypoplasia of mandible     IBS (irritable bowel syndrome) 02/13/2023    medicated    Maxillary hypoplasia     Sleep apnea 02/13/2023    positive JAYESH, but is unable to use CPAP        Past Surgical History:   Procedure Laterality Date    CT OPEN IMPLTJ HPGLSL NRV NSTIM RA PG&RESPIR SENSOR Right 3/6/2023    Procedure: INSERTION OF HYPOGLOSSAL NERVE NEUROSTIMULATOR ELECTRODE AND GENERATOR AND BREATHING SENSOR ELECTRODE RIGHT;  Surgeon: Pedro Wen M.D.;  Location: SURGERY SAME DAY [facility];  Service: Ent    OTHER  02/13/2023 11/2021 septoplasty    OTHER  02/13/2023 2021 Hard palate expander placed    OTHER ABDOMINAL SURGERY          Family History   Problem Relation Age of Onset    Colorectal Cancer Paternal Uncle         after age 50       Social History     Socioeconomic History    Marital status:      Spouse name: Not on file    Number of children: Not on file    Years of education: Not on file    Highest education level: Not on file   Occupational History    Not on file   Tobacco Use    Smoking status: Never    Smokeless tobacco: Never   Vaping Use    Vaping Use: Never used   Substance and Sexual Activity    Alcohol use: Yes     Comment: very rarely    Drug use: Never    Sexual activity: Yes     Partners: Female     Birth control/protection: None   Other Topics Concern    Not on file   Social History Narrative    Not on file     Social Determinants of Health     Financial Resource Strain: Not on file   Food Insecurity: Not on file   Transportation Needs: Not on file   Physical Activity: Not on file   Stress: Not on file   Social Connections: Not on file   Intimate Partner Violence: Not on file   Housing Stability: Not on file       Current Outpatient Medications   Medication Sig Dispense Refill    traZODone (DESYREL) 50 MG Tab Take 1 Tablet by mouth every evening. 30 Tablet 0    levothyroxine (SYNTHROID) 25 MCG Tab Take 1 Tablet by mouth every morning on an empty stomach. 90 Tablet 1    ergocalciferol (DRISDOL) 79840 UNIT capsule Take 1 Capsule by mouth every 7 days. 12 Capsule 0    pantoprazole (PROTONIX) 40 MG Tablet Delayed Response Take 1 Tablet by mouth every day. 90 Tablet 3    budesonide-formoterol (SYMBICORT) 160-4.5 MCG/ACT Aerosol Inhale 2 puffs by mouth twice daily 30.6 g 11    VITAMIN D PO Take  by mouth every day.      B Complex Vitamins (VITAMIN B COMPLEX PO) Take  by mouth every day.      albuterol 108 (90 Base) MCG/ACT Aero Soln inhalation aerosol Inhale 2 Puffs every four hours as needed for Shortness of Breath. 8.5 g 3    albuterol (PROVENTIL) 2.5mg/0.5ml Nebu Soln Take 2.5 mg by nebulization every four hours  "as needed for Shortness of Breath.      ondansetron (ZOFRAN ODT) 4 MG TABLET DISPERSIBLE Take 4 mg by mouth every 6 hours as needed for Nausea.      cholestyramine (QUESTRAN) 4 GM/DOSE powder MIX 1 SCOOP FULL in liquid and drink TWICE A DAY (PT PREFERJJ ROSE NDC 1648-1519-93) 2268 g 2     No current facility-administered medications for this visit.        ALLERGIES: Patient has no known allergies.    ROS  Constitutional: Denies fevers, Denies weight changes  Ears/Nose/Throat/Mouth: Denies nasal congestion or sore throat   Cardiovascular: Denies chest pain  Respiratory: Denies shortness of breath, Denies cough  Gastrointestinal/Hepatic: Denies nausea, vomiting  Sleep: see HPI      PHYSICAL EXAM  /84 (BP Location: Left arm, Patient Position: Sitting, BP Cuff Size: Adult)   Pulse (!) 102   Resp 16   Ht 1.88 m (6' 2\")   Wt 102 kg (225 lb)   SpO2 96%   BMI 28.89 kg/m²   Appearance: Well-nourished, well-developed, no acute distress  Eyes:  No scleral icterus , EOMI  Musculoskeletal:  Grossly normal; gait and station normal; digits and nails normal  Skin:  No rashes, petechiae, cyanosis  Neurologic: without focal signs; oriented to person, time, place, and purpose; judgement intact      Medical Decision Making   Assessment and Plan  Abel Webster is a 43 y.o.male  with GERD, asthma, MDD, chronic insomnia, delayed sleep phase, daytime sleepiness, and obstructive sleep apnea status post hypoglossal nerve stimulator.  Presents today to follow-up regarding management of obstructive sleep apnea.    The medical record was reviewed.    Obstructive sleep apnea   Patient continues to use hypoglossal nerve stimulator.  He has tried to increase his voltage settings but feels that with increase it is too strong wakes him up at night.  He continues to feel that he gets a light sleep but is sleeping more with his device each night.  His device was implanted in March 2023.  He states he would like to know if his current " settings are improving his sleep apnea so that he can focus on his sleep schedule and quality of sleep.  Will plan to proceed with in lab inspire fine-tune study    PLAN:   -Order placed for PSG inspire fine-tune  -Continued use of hypoglossal nerve stimulator  -Advised to reach out via MyChart with questions     Patients with JAYESH are at increased risk of cardiovascular disease including coronary artery disease, systemic arterial hypertension, pulmonary arterial hypertension, cardiac arrythmias, and stroke. The patient was advised to avoid driving a motor vehicle when drowsy.    Delayed sleep phase and insomnia   Patient has met with sleep psychology and was told that there was nothing they could do for him at this time.  He is open to trying trazodone for sleep.  Advised to start trazodone 50 mg at bedtime.  He may increase to 100 mg at bedtime to see if he gets any benefit.    Plan  -Start trazodone 50 mg may increase to 100 mg    Have advised the patient to follow up with the appropriate healthcare practitioners for all other medical problems and issues.    Return in about 5 weeks (around 5/2/2024).      Please note portions of this record was created using voice recognition software. I have made every reasonable attempt to correct obvious errors, but I expect that there are errors of grammar and possibly content I did not discover before finalizing the note.

## 2024-04-15 ENCOUNTER — SLEEP STUDY (OUTPATIENT)
Dept: SLEEP MEDICINE | Facility: MEDICAL CENTER | Age: 44
End: 2024-04-15
Attending: STUDENT IN AN ORGANIZED HEALTH CARE EDUCATION/TRAINING PROGRAM
Payer: COMMERCIAL

## 2024-04-15 DIAGNOSIS — Z96.82 S/P INSERTION OF HYPOGLOSSAL NERVE STIMULATOR: ICD-10-CM

## 2024-04-15 DIAGNOSIS — G47.33 OSA (OBSTRUCTIVE SLEEP APNEA): ICD-10-CM

## 2024-04-15 PROCEDURE — 95810 POLYSOM 6/> YRS 4/> PARAM: CPT | Performed by: STUDENT IN AN ORGANIZED HEALTH CARE EDUCATION/TRAINING PROGRAM

## 2024-04-16 NOTE — PROCEDURES
Patient: KALI STRANGE  ID: [id number] Date: 4/15/2024   MONTAGE: Standard  STUDY TYPE: Treatment  RECORDING TECHNIQUE:   After the scalp was prepared, gold plated electrodes were applied to the scalp according to the International 10-20 System. EEG (electroencephalogram) was continuously monitored from the O1-M2, O2-M1, C3-M2, C4-M1, F3-M2, and F4-M1. EOGs (electrooculograms) were monitored by electrodes placed at the left and right outer canthi. Chin EMG (electromyogram) was monitored by electrodes placed on the mentalis and sub-mentalis muscles. Nasal and oral airflow were monitored using a triple port thermocouple as well as oronasal pressure transducer. Respiratory effort was measured by inductive plethysmography technology employing abdominal and thoracic belts. Blood oxygen saturation and pulse were monitored by pulse oximetry. Heart rhythm was monitored by surface electrocardiogram. Leg EMG was studied using surface electrodes placed on left and right anterior tibialis. A microphone was used to monitor tracheal sounds and snoring. Body position was monitored and documented by technician observation.   SCORING CRITERIA:   A modification of the AASM manual for scoring of sleep and associated events was used. Obstructive apneas were scored by cessation of airflow for at least 10 seconds with continuing respiratory effort. Central apneas were scored by cessation of airflow for at least 10 seconds with no respiratory effort. Hypopneas were scored by a 30% or more reduction in airflow for at least 10 seconds accompanied by arterial oxygen desaturation of 3% or an arousal. For CMS (Medicare) patients, per AASM rule 1B, hypopneas are scored by 30% with mild reduction in airflow for at least 10 seconds accompanied by arterial saturation decreased at 4%.    Study start time was 10:55:17 PM. Diagnostic recording time was 7h 7.0m with a total sleep time of 6h 0.0m resulting in a sleep efficiency of 84.31%%. Sleep  latency from the start of the study was 23 minutes and the latency from sleep to REM was 126 minutes. In total,50 arousals were scored for an arousal index of 8.3.  Respiratory:  There were a total of 23 apneas consisting of 11 obstructive apneas, 0 mixed apneas, and 12 central apneas. A total of 71 hypopneas were scored. The apnea index was 3.83 per hour and the hypopnea index was 11.83 per hour resulting in an overall AHI of 15.67. AHI during REM was 26.9 and AHI while supine was 51.93.  Oximetry:  There was a mean oxygen saturation of 91.0%. The minimum oxygen saturation in NREM was 83.0 % and in REM was 81.0%. The patient spent 53.6 minutes of TST with SaO2 <88%.  Cardiac:  The highest heart rate seen while awake was 104 BPM while the highest heart rate during sleep was 105 BPM with an average sleeping heart rate of 75 BPM.  Limb Movements:  There were a total of 22 PLMs during sleep which resulted in a PLMS index of 3.7. Of these, 8 were associated with arousals which resulted in a PLMS arousal index of 1.3.  Titration:   Inspire was tried from 0.9 to 1.2V  This was a fully attended sleep study. This test was technically adequate. This patient was titrated on Inspire device (hypoglossal nerve stimulator) starting at a voltage of  0.9v. Patient was titrated up to 1.2 v.  The patient did best at 1.2v. Patient spent 105 minutes at this voltage and the AHI was 1.7 which is considered treated obstructive sleep apnea.     Impression:  1.  Inspire device was used during out of study, electrode configuration (- + -)  2.  Obstructive sleep apnea improved with increasing voltage levels  3.  In lateral sleep sleep apnea was controlled at a voltage of 1.2 V  4.  Total sleep time 6 hours with a sleep efficiency of 84%  5.  Supine REM sleep was seen during night of study earlier in the night at a lower voltage setting  6.  No other significant physiologic disturbances to sleep was seen during out of  study  Recommendations:  I recommend the patient continue inspire device at a voltage 1.2 V.  Encourage avoiding back sleeping.    Clinical correlation is required. In general patients with sleep apnea are advised to avoid alcohol, sedatives and not to operate a motor vehicle while drowsy.  Untreated sleep apnea increases the risk for cardiovascular and neurovascular disease.

## 2024-05-10 DIAGNOSIS — G47.33 OBSTRUCTIVE SLEEP APNEA: ICD-10-CM

## 2024-05-10 DIAGNOSIS — R53.82 CHRONIC FATIGUE: ICD-10-CM

## 2024-05-10 PROCEDURE — RXMED WILLOW AMBULATORY MEDICATION CHARGE: Performed by: INTERNAL MEDICINE

## 2024-05-13 NOTE — TELEPHONE ENCOUNTER
Received request via: Pharmacy    Was the patient seen in the last year in this department? Yes    Does the patient have an active prescription (recently filled or refills available) for medication(s) requested? No    Pharm[facility] Name: [facility]     Does the patient have [facility] and need 100 day supply (blood pressure, diabetes and cholesterol meds only)? Patient does not have SCP

## 2024-05-14 ENCOUNTER — PHARMACY VISIT (OUTPATIENT)
Dept: PHARMACY | Facility: MEDICAL CENTER | Age: 44
End: 2024-05-14
Payer: COMMERCIAL

## 2024-05-14 ENCOUNTER — PATIENT MESSAGE (OUTPATIENT)
Dept: MEDICAL GROUP | Facility: MEDICAL CENTER | Age: 44
End: 2024-05-14
Payer: COMMERCIAL

## 2024-05-14 PROCEDURE — RXOTC WILLOW AMBULATORY OTC CHARGE: Performed by: PHARMACIST

## 2024-05-14 RX ORDER — ARMODAFINIL 250 MG/1
250 TABLET ORAL DAILY
Qty: 90 TABLET | Refills: 0 | Status: SHIPPED | OUTPATIENT
Start: 2024-05-14 | End: 2024-05-17 | Stop reason: SDUPTHER

## 2024-05-16 ENCOUNTER — OFFICE VISIT (OUTPATIENT)
Dept: SLEEP MEDICINE | Facility: MEDICAL CENTER | Age: 44
End: 2024-05-16
Attending: STUDENT IN AN ORGANIZED HEALTH CARE EDUCATION/TRAINING PROGRAM
Payer: COMMERCIAL

## 2024-05-16 VITALS
RESPIRATION RATE: 16 BRPM | HEART RATE: 100 BPM | OXYGEN SATURATION: 98 % | BODY MASS INDEX: 27.76 KG/M2 | SYSTOLIC BLOOD PRESSURE: 118 MMHG | DIASTOLIC BLOOD PRESSURE: 78 MMHG | HEIGHT: 74 IN | WEIGHT: 216.3 LBS

## 2024-05-16 DIAGNOSIS — Z45.42 ENCOUNTER FOR ADJUSTMENT AND MANAGEMENT OF NEUROSTIMULATOR: ICD-10-CM

## 2024-05-16 DIAGNOSIS — Z96.82 S/P INSERTION OF HYPOGLOSSAL NERVE STIMULATOR: ICD-10-CM

## 2024-05-16 DIAGNOSIS — G47.33 OSA (OBSTRUCTIVE SLEEP APNEA): Primary | ICD-10-CM

## 2024-05-16 DIAGNOSIS — G47.21 DELAYED SLEEP PHASE SYNDROME: ICD-10-CM

## 2024-05-16 DIAGNOSIS — F51.04 CHRONIC INSOMNIA: ICD-10-CM

## 2024-05-16 PROCEDURE — 3074F SYST BP LT 130 MM HG: CPT | Performed by: STUDENT IN AN ORGANIZED HEALTH CARE EDUCATION/TRAINING PROGRAM

## 2024-05-16 PROCEDURE — 3078F DIAST BP <80 MM HG: CPT | Performed by: STUDENT IN AN ORGANIZED HEALTH CARE EDUCATION/TRAINING PROGRAM

## 2024-05-16 PROCEDURE — 99214 OFFICE O/P EST MOD 30 MIN: CPT | Mod: 25 | Performed by: STUDENT IN AN ORGANIZED HEALTH CARE EDUCATION/TRAINING PROGRAM

## 2024-05-16 PROCEDURE — 95972 ALYS CPLX SP/PN NPGT W/PRGRM: CPT | Performed by: STUDENT IN AN ORGANIZED HEALTH CARE EDUCATION/TRAINING PROGRAM

## 2024-05-16 ASSESSMENT — FIBROSIS 4 INDEX: FIB4 SCORE: 0.7

## 2024-05-16 NOTE — PROGRESS NOTES
[facility] Follow-up Visit    CC: Follow-up regarding management of obstructive sleep apnea and insomnia      HPI:  Abel Webster is a 43 y.o.male  with asthma, GERD, MDD, chronic insomnia, delayed sleep phase, daytime sleepiness, and obstructive sleep apnea status post hypoglossal nerve stimulator.  Presents today to follow-up regarding management of obstructive sleep apnea and chronic insomnia.    Last visit he has undergone a in lab sleep study.  He did not review the study results prior to that visit.  He states that he was concerned about the amount of sleep that he got during the night and took his trazodone 50 mg at night.    He continues to use his inspire device regularly.  He has tried to increase his device at times but he feels as though his throat movement can be prominent.  He feels that the device moving his tongue and lower airway can be uncomfortable at times.  It is not the stimulation itself but the sensation of movement that he finds uncomfortable.    With using trazodone he has found his ability to get to sleep slightly improved.  He feels that the trazodone does not knock him out but does cause him to ease into sleep more easily.  He finds with using it he is not waking up as much.  He did try taking 2 tablets of the 50 mg trazodone but found the next day he was more groggy.  He did not try 1-1/2 tablets.    He has previously tried Lunesta and Dayvigo for sleep    Programming and interrogation of hypoglossal nerve stimulator  Stimulation was delivered to incoming voltage settings which elicited tongue protrusion.  Patient found stimulation to be tolerable.  Stimulation level was increased to 1.2 V which patient felt was slightly stronger than previous voltage.  Changed his range at today's visit given target treatment goal of 1.2 V.    Incoming settings  1)Start Delay: 75 min  2)Pause Time: 30 min  3)Total run time: 11 hours  4) Minimum voltage 1.0 v   5) Maximum voltage 2.0v  6)  Pulse configuration (- + -)  7) Pulse Width 90 microseconds  8) Rate 33 Hz  9) Current voltage 1.1v    Outgoing settings (changes in bold)  Incoming settings  1)Start Delay: 75 min  2)Pause Time: 30 min  3)Total run time: 11 hours  4) Minimum voltage 1.0 v   5) Maximum voltage 1.3v  6) Pulse configuration (- + -)  7) Pulse Width 90 microseconds  8) Rate 33 Hz  9) Current voltage 1.1v    Sleep History  8/31/2022 PSG showed moderate obstructive sleep apnea with an overall AHI of 17 events an hour, minimum oxygen saturation 81%, time at or below 88% saturation 48 minutes  4/15/2024 PSG inspire fine-tune study showed an overall AHI of 15.7 events an hour.  Time at or below 88% saturation of 54 minutes.  Towards end of study at 1.2 V 105 minutes of sleep was seen with an overall AHI of 1.7 V.  This was in the lateral position.  Oxygen saturations normalized during this period.    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    Hypertrophy, nasal, turbinate 07/03/2019    Fatty liver 11/07/2018    Migraines 11/07/2018    Asthma 11/07/2018    Jaw pain 07/18/2017    Hemorrhoids 03/15/2017    Gastroesophageal reflux disease 03/14/2017    Major depressive disorder, recurrent episode (HCC) 08/31/2016    Arthropathy of lumbar facet joint 07/12/2016    Chronic low back pain 02/14/2016    Obstructive sleep apnea 09/09/2015    Plantar fasciitis, bilateral 08/19/2015    Anal pain 05/12/2015    Irritable bowel syndrome 11/17/2011    Allergic rhinitis 05/24/1996       Past Medical History:   Diagnosis Date    Arthropathy     Asthma 02/13/2023    inhalers daily and prn    Chickenpox     Chronic fatigue     Chronic pain 02/13/2023    back, buttocks    Disorder of thyroid  02/13/2023    medicated    Fatty liver     GERD (gastroesophageal reflux disease)     Heart burn 02/13/2023    medicated    Hypoplasia of mandible     IBS (irritable bowel syndrome) 02/13/2023    medicated    Maxillary hypoplasia     Sleep apnea 02/13/2023    positive JAYESH, but is unable to use CPAP        Past Surgical History:   Procedure Laterality Date    [place] OPEN IMPLTJ HPGLSL NRV NSTIM RA PG&RESPIR SENSOR Right 3/6/2023    Procedure: INSERTION OF HYPOGLOSSAL NERVE NEUROSTIMULATOR ELECTRODE AND GENERATOR AND BREATHING SENSOR ELECTRODE RIGHT;  Surgeon: Pedro Wen M.D.;  Location: SURGERY SAME DAY [facility];  Service: Ent    OTHER  02/13/2023 11/2021 septoplasty    OTHER  02/13/2023 2021 Hard palate expander placed    OTHER ABDOMINAL SURGERY         Family History   Problem Relation Age of Onset    Colorectal Cancer Paternal Uncle         after age 50       Social History     Socioeconomic History    Marital status:      Spouse name: Not on file    Number of children: Not on file    Years of education: Not on file    Highest education level: Not on file   Occupational History    Not on file   Tobacco Use    Smoking status: Never    Smokeless tobacco: Never   Vaping Use    Vaping status: Never Used   Substance and Sexual Activity    Alcohol use: Yes     Comment: very rarely    Drug use: Never    Sexual activity: Yes     Partners: Female     Birth control/protection: None   Other Topics Concern    Not on file   Social History Narrative    Not on file     Social Determinants of Health     Financial Resource Strain: Not on file   Food Insecurity: Not on file   Transportation Needs: Not on file   Physical Activity: Not on file   Stress: Not on file   Social Connections: Not on file   Intimate Partner Violence: Not on file   Housing Stability: Not on file       Current Outpatient Medications   Medication Sig Dispense Refill    Armodafinil 250 MG Tab Take 250 mg by mouth every day for 90 days. 90 Tablet 0  "   traZODone (DESYREL) 50 MG Tab Take 1 Tablet by mouth every evening. 30 Tablet 0    levothyroxine (SYNTHROID) 25 MCG Tab Take 1 Tablet by mouth every morning on an empty stomach. 90 Tablet 1    ergocalciferol (DRISDOL) 69184 UNIT capsule Take 1 Capsule by mouth every 7 days. 12 Capsule 0    pantoprazole (PROTONIX) 40 MG Tablet Delayed Response Take 1 Tablet by mouth every day. 90 Tablet 3    budesonide-formoterol (SYMBICORT) 160-4.5 MCG/ACT Aerosol Inhale 2 puffs by mouth twice daily 30.6 g 11    VITAMIN D PO Take  by mouth every day.      B Complex Vitamins (VITAMIN B COMPLEX PO) Take  by mouth every day.      albuterol 108 (90 Base) MCG/ACT Aero Soln inhalation aerosol Inhale 2 Puffs every four hours as needed for Shortness of Breath. 8.5 g 3    albuterol (PROVENTIL) 2.5mg/0.5ml Nebu Soln Take 2.5 mg by nebulization every four hours as needed for Shortness of Breath.      ondansetron (ZOFRAN ODT) 4 MG TABLET DISPERSIBLE Take 4 mg by mouth every 6 hours as needed for Nausea.      cholestyramine (QUESTRAN) 4 GM/DOSE powder MIX 1 SCOOP FULL in liquid and drink TWICE A DAY (PT PREFERS CANS NDC 1222-1315-31) 2268 g 2     No current facility-administered medications for this visit.        ALLERGIES: Patient has no known allergies.    ROS  Constitutional: Denies fevers, Denies weight changes  Ears/Nose/Throat/Mouth: Denies nasal congestion or sore throat   Cardiovascular: Denies chest pain  Respiratory: Denies shortness of breath, Denies cough  Gastrointestinal/Hepatic: Denies nausea, vomiting  Sleep: see HPI      PHYSICAL EXAM  /78 (BP Location: Left arm, Patient Position: Sitting, BP Cuff Size: Adult)   Pulse 100   Resp 16   Ht 1.88 m (6' 2\")   Wt 98.1 kg (216 lb 4.8 oz)   SpO2 98%   BMI 27.77 kg/m²   Appearance: Well-nourished, well-developed, no acute distress  Eyes:  No scleral icterus , EOMI  Musculoskeletal:  Grossly normal; gait and station normal; digits and nails normal  Skin:  No rashes, " petechiae, cyanosis  Neurologic: without focal signs; oriented to person, time, place, and purpose; judgement intact      Medical Decision Making   Assessment and Plan  Abel Webster is a 43 y.o.male  with asthma, GERD, MDD, chronic insomnia, delayed sleep phase, daytime sleepiness, and obstructive sleep apnea status post hypoglossal nerve stimulator.  Presents today to follow-up regarding management of obstructive sleep apnea and chronic insomnia.    The medical record was reviewed.    Obstructive sleep apnea  Patient continues to use and benefit from hypoglossal nerve stimulator.  Reviewed recent sleep study results with patient showing improvement in obstructive sleep apnea with the use of inspire device and lateral sleeping.  Advised the patient to avoid back sleeping.  Discussed positional therapy including positional therapy devices.  Advised that he may try positional therapy device such as pillows, sleep noodle or apnea shield.    Reviewed that given his treatment goal of being at 1.2 V advised for him to try to go up by 1 setting especially with lateral sleep.    PLAN:   -Continue hypoglossal nerve stimulator  -Simple programming done at today's visit  -Start positional therapy with positional therapy device  -Advised to reach out via MyChart with questions     Patients with JAYESH are at increased risk of cardiovascular disease including coronary artery disease, systemic arterial hypertension, pulmonary arterial hypertension, cardiac arrythmias, and stroke. The patient was advised to avoid driving a motor vehicle when drowsy.    Chronic insomnia and delayed sleep phase  Patient is noticing some benefit from use of trazodone with sleep.  Discussed importance of maintaining regular schedule.  Advised that he may try 1-1/2 tablets to see if this helps further without causing the significant grogginess.    Plan  -Continue trazodone 50 mg to 100 mg at bedtime.  Patient may take 75 mg if finds this  helpful.  -Maintain same schedule throughout the whole week with same bedtime and wake time    Have advised the patient to follow up with the appropriate healthcare practitioners for all other medical problems and issues.    Return in about 3 months (around 8/16/2024).    Total time care for the patient this date was 45 minutes. Time spent includes chart review, lab review, discussion with myself. This time was spent separate from device analysis /programming on this date.      Please note portions of this record was created using voice recognition software. I have made every reasonable attempt to correct obvious errors, but I expect that there are errors of grammar and possibly content I did not discover before finalizing the note.

## 2024-05-17 DIAGNOSIS — G47.33 OBSTRUCTIVE SLEEP APNEA: ICD-10-CM

## 2024-05-17 DIAGNOSIS — R53.82 CHRONIC FATIGUE: ICD-10-CM

## 2024-05-17 RX ORDER — ARMODAFINIL 250 MG/1
250 TABLET ORAL DAILY
Qty: 90 TABLET | Refills: 0 | Status: SHIPPED | OUTPATIENT
Start: 2024-05-17 | End: 2024-08-15

## 2024-05-17 NOTE — TELEPHONE ENCOUNTER
Received request via: Patient    Was the patient seen in the last year in this department? Yes    Does the patient have an active prescription (recently filled or refills available) for medication(s) requested? No    Pharmacy Name: [facility]    Does the patient have custodial Plus and need 100 day supply (blood pressure, diabetes and cholesterol meds only)? Patient does not have SCP

## 2024-05-22 ENCOUNTER — PATIENT MESSAGE (OUTPATIENT)
Dept: SLEEP MEDICINE | Facility: MEDICAL CENTER | Age: 44
End: 2024-05-22
Payer: COMMERCIAL

## 2024-05-22 ENCOUNTER — TELEPHONE (OUTPATIENT)
Dept: MEDICAL GROUP | Facility: MEDICAL CENTER | Age: 44
End: 2024-05-22

## 2024-05-22 DIAGNOSIS — F51.04 CHRONIC INSOMNIA: ICD-10-CM

## 2024-05-22 DIAGNOSIS — G47.21 DELAYED SLEEP PHASE SYNDROME: ICD-10-CM

## 2024-05-22 NOTE — TELEPHONE ENCOUNTER
Tried to submit PA via cover my meds unable to process online.      Cover my meds out come:   [facility] Prior Authorization Department does not manage Prior Authorizations for this plan. Please contact member services phone number on the back of the member ID card.     All information gathered and faxed to [facility]

## 2024-05-23 ENCOUNTER — PATIENT MESSAGE (OUTPATIENT)
Dept: MEDICAL GROUP | Facility: MEDICAL CENTER | Age: 44
End: 2024-05-23
Payer: COMMERCIAL

## 2024-05-23 PROCEDURE — RXMED WILLOW AMBULATORY MEDICATION CHARGE: Performed by: STUDENT IN AN ORGANIZED HEALTH CARE EDUCATION/TRAINING PROGRAM

## 2024-05-23 RX ORDER — TRAZODONE HYDROCHLORIDE 50 MG/1
100 TABLET ORAL NIGHTLY
Qty: 180 TABLET | Refills: 0 | Status: SHIPPED | OUTPATIENT
Start: 2024-05-23 | End: 2024-08-22

## 2024-05-24 ENCOUNTER — PHARMACY VISIT (OUTPATIENT)
Dept: PHARMACY | Facility: MEDICAL CENTER | Age: 44
End: 2024-05-24
Payer: COMMERCIAL

## 2024-05-28 PROCEDURE — RXMED WILLOW AMBULATORY MEDICATION CHARGE: Performed by: INTERNAL MEDICINE

## 2024-05-30 ENCOUNTER — PHARMACY VISIT (OUTPATIENT)
Dept: PHARMACY | Facility: MEDICAL CENTER | Age: 44
End: 2024-05-30
Payer: COMMERCIAL

## 2024-05-30 PROCEDURE — RXMED WILLOW AMBULATORY MEDICATION CHARGE: Performed by: INTERNAL MEDICINE

## 2024-06-14 ENCOUNTER — APPOINTMENT (OUTPATIENT)
Dept: MEDICAL GROUP | Facility: MEDICAL CENTER | Age: 44
End: 2024-06-14
Payer: COMMERCIAL

## 2024-06-22 PROCEDURE — RXMED WILLOW AMBULATORY MEDICATION CHARGE: Performed by: INTERNAL MEDICINE

## 2024-06-27 ENCOUNTER — PHARMACY VISIT (OUTPATIENT)
Dept: PHARMACY | Facility: MEDICAL CENTER | Age: 44
End: 2024-06-27
Payer: COMMERCIAL

## 2024-07-03 ENCOUNTER — APPOINTMENT (OUTPATIENT)
Dept: MEDICAL GROUP | Facility: MEDICAL CENTER | Age: 44
End: 2024-07-03
Payer: MEDICARE

## 2024-07-10 ENCOUNTER — PATIENT MESSAGE (OUTPATIENT)
Dept: MEDICAL GROUP | Facility: MEDICAL CENTER | Age: 44
End: 2024-07-10
Payer: COMMERCIAL

## 2024-07-16 ENCOUNTER — TELEMEDICINE (OUTPATIENT)
Dept: MEDICAL GROUP | Facility: MEDICAL CENTER | Age: 44
End: 2024-07-16
Payer: COMMERCIAL

## 2024-07-16 DIAGNOSIS — G47.33 OBSTRUCTIVE SLEEP APNEA: ICD-10-CM

## 2024-07-16 DIAGNOSIS — Z98.890 S/P NASAL SEPTOPLASTY: ICD-10-CM

## 2024-07-16 DIAGNOSIS — Z96.82 S/P PLACEMENT OF HYPOGLOSSAL NERVE STIMULATOR: ICD-10-CM

## 2024-07-16 DIAGNOSIS — J34.89 NASAL OBSTRUCTION: ICD-10-CM

## 2024-07-16 DIAGNOSIS — J45.40 MODERATE PERSISTENT ASTHMA WITHOUT COMPLICATION: ICD-10-CM

## 2024-07-16 PROCEDURE — 99214 OFFICE O/P EST MOD 30 MIN: CPT | Mod: 95 | Performed by: INTERNAL MEDICINE

## 2024-07-16 PROCEDURE — RXMED WILLOW AMBULATORY MEDICATION CHARGE: Performed by: INTERNAL MEDICINE

## 2024-07-16 RX ORDER — FLUTICASONE FUROATE, UMECLIDINIUM BROMIDE AND VILANTEROL TRIFENATATE 200; 62.5; 25 UG/1; UG/1; UG/1
1 POWDER RESPIRATORY (INHALATION) DAILY
Qty: 60 EACH | Refills: 5 | Status: SHIPPED | OUTPATIENT
Start: 2024-07-16

## 2024-07-17 ENCOUNTER — PHARMACY VISIT (OUTPATIENT)
Dept: PHARMACY | Facility: MEDICAL CENTER | Age: 44
End: 2024-07-17
Payer: COMMERCIAL

## 2024-07-24 ENCOUNTER — APPOINTMENT (OUTPATIENT)
Dept: MEDICAL GROUP | Facility: MEDICAL CENTER | Age: 44
End: 2024-07-24
Payer: MEDICARE

## 2024-07-26 ENCOUNTER — APPOINTMENT (OUTPATIENT)
Dept: MEDICAL GROUP | Facility: MEDICAL CENTER | Age: 44
End: 2024-07-26
Payer: MEDICARE

## 2024-07-28 DIAGNOSIS — J45.40 MODERATE PERSISTENT ASTHMA WITHOUT COMPLICATION: ICD-10-CM

## 2024-07-28 PROCEDURE — RXMED WILLOW AMBULATORY MEDICATION CHARGE: Performed by: INTERNAL MEDICINE

## 2024-07-30 ENCOUNTER — APPOINTMENT (OUTPATIENT)
Dept: MEDICAL GROUP | Facility: MEDICAL CENTER | Age: 44
End: 2024-07-30
Payer: MEDICARE

## 2024-07-30 RX ORDER — ALBUTEROL SULFATE 90 UG/1
2 AEROSOL, METERED RESPIRATORY (INHALATION) EVERY 4 HOURS PRN
Qty: 8.5 G | Refills: 3 | OUTPATIENT
Start: 2024-07-30

## 2024-08-04 ENCOUNTER — PHARMACY VISIT (OUTPATIENT)
Dept: PHARMACY | Facility: MEDICAL CENTER | Age: 44
End: 2024-08-04
Payer: COMMERCIAL

## 2024-08-12 ENCOUNTER — PATIENT MESSAGE (OUTPATIENT)
Dept: MEDICAL GROUP | Facility: MEDICAL CENTER | Age: 44
End: 2024-08-12
Payer: COMMERCIAL

## 2024-08-12 DIAGNOSIS — J45.40 MODERATE PERSISTENT ASTHMA WITHOUT COMPLICATION: ICD-10-CM

## 2024-08-12 DIAGNOSIS — E78.00 PURE HYPERCHOLESTEROLEMIA: ICD-10-CM

## 2024-08-13 ENCOUNTER — HOSPITAL ENCOUNTER (OUTPATIENT)
Dept: LAB | Facility: MEDICAL CENTER | Age: 44
End: 2024-08-13
Attending: INTERNAL MEDICINE
Payer: COMMERCIAL

## 2024-08-13 DIAGNOSIS — E03.8 SUBCLINICAL HYPOTHYROIDISM: ICD-10-CM

## 2024-08-13 DIAGNOSIS — E78.00 PURE HYPERCHOLESTEROLEMIA: ICD-10-CM

## 2024-08-13 DIAGNOSIS — E55.9 VITAMIN D DEFICIENCY: ICD-10-CM

## 2024-08-13 DIAGNOSIS — R74.01 ALT (SGPT) LEVEL RAISED: ICD-10-CM

## 2024-08-13 DIAGNOSIS — R05.3 CHRONIC COUGH: ICD-10-CM

## 2024-08-13 DIAGNOSIS — R73.01 IFG (IMPAIRED FASTING GLUCOSE): ICD-10-CM

## 2024-08-13 LAB
25(OH)D3 SERPL-MCNC: 38 NG/ML (ref 30–100)
ALBUMIN SERPL BCP-MCNC: 4.5 G/DL (ref 3.2–4.9)
ALBUMIN/GLOB SERPL: 1.6 G/DL
ALP SERPL-CCNC: 78 U/L (ref 30–99)
ALT SERPL-CCNC: 81 U/L (ref 2–50)
ANION GAP SERPL CALC-SCNC: 13 MMOL/L (ref 7–16)
AST SERPL-CCNC: 45 U/L (ref 12–45)
BASOPHILS # BLD AUTO: 0.3 % (ref 0–1.8)
BASOPHILS # BLD: 0.02 K/UL (ref 0–0.12)
BILIRUB SERPL-MCNC: 1.6 MG/DL (ref 0.1–1.5)
BUN SERPL-MCNC: 10 MG/DL (ref 8–22)
CALCIUM ALBUM COR SERPL-MCNC: 9.7 MG/DL (ref 8.5–10.5)
CALCIUM SERPL-MCNC: 10.1 MG/DL (ref 8.5–10.5)
CHLORIDE SERPL-SCNC: 107 MMOL/L (ref 96–112)
CHOLEST SERPL-MCNC: 184 MG/DL (ref 100–199)
CO2 SERPL-SCNC: 25 MMOL/L (ref 20–33)
CREAT SERPL-MCNC: 0.92 MG/DL (ref 0.5–1.4)
EOSINOPHIL # BLD AUTO: 0.07 K/UL (ref 0–0.51)
EOSINOPHIL NFR BLD: 1 % (ref 0–6.9)
ERYTHROCYTE [DISTWIDTH] IN BLOOD BY AUTOMATED COUNT: 40 FL (ref 35.9–50)
EST. AVERAGE GLUCOSE BLD GHB EST-MCNC: 117 MG/DL
FASTING STATUS PATIENT QL REPORTED: NORMAL
GFR SERPLBLD CREATININE-BSD FMLA CKD-EPI: 105 ML/MIN/1.73 M 2
GLOBULIN SER CALC-MCNC: 2.8 G/DL (ref 1.9–3.5)
GLUCOSE SERPL-MCNC: 95 MG/DL (ref 65–99)
HBA1C MFR BLD: 5.7 % (ref 4–5.6)
HCT VFR BLD AUTO: 45.3 % (ref 42–52)
HDLC SERPL-MCNC: 42 MG/DL
HGB BLD-MCNC: 15.6 G/DL (ref 14–18)
IMM GRANULOCYTES # BLD AUTO: 0.03 K/UL (ref 0–0.11)
IMM GRANULOCYTES NFR BLD AUTO: 0.4 % (ref 0–0.9)
LDLC SERPL CALC-MCNC: 102 MG/DL
LYMPHOCYTES # BLD AUTO: 2.36 K/UL (ref 1–4.8)
LYMPHOCYTES NFR BLD: 33.1 % (ref 22–41)
MCH RBC QN AUTO: 31.4 PG (ref 27–33)
MCHC RBC AUTO-ENTMCNC: 34.4 G/DL (ref 32.3–36.5)
MCV RBC AUTO: 91.1 FL (ref 81.4–97.8)
MONOCYTES # BLD AUTO: 0.73 K/UL (ref 0–0.85)
MONOCYTES NFR BLD AUTO: 10.2 % (ref 0–13.4)
NEUTROPHILS # BLD AUTO: 3.92 K/UL (ref 1.82–7.42)
NEUTROPHILS NFR BLD: 55 % (ref 44–72)
NRBC # BLD AUTO: 0 K/UL
NRBC BLD-RTO: 0 /100 WBC (ref 0–0.2)
PLATELET # BLD AUTO: 302 K/UL (ref 164–446)
PMV BLD AUTO: 9.2 FL (ref 9–12.9)
POTASSIUM SERPL-SCNC: 4.9 MMOL/L (ref 3.6–5.5)
PROT SERPL-MCNC: 7.3 G/DL (ref 6–8.2)
RBC # BLD AUTO: 4.97 M/UL (ref 4.7–6.1)
SODIUM SERPL-SCNC: 145 MMOL/L (ref 135–145)
TRIGL SERPL-MCNC: 200 MG/DL (ref 0–149)
TSH SERPL DL<=0.005 MIU/L-ACNC: 3.11 UIU/ML (ref 0.38–5.33)
WBC # BLD AUTO: 7.1 K/UL (ref 4.8–10.8)

## 2024-08-13 PROCEDURE — 83036 HEMOGLOBIN GLYCOSYLATED A1C: CPT | Mod: GA

## 2024-08-13 PROCEDURE — 80053 COMPREHEN METABOLIC PANEL: CPT

## 2024-08-13 PROCEDURE — 85025 COMPLETE CBC W/AUTO DIFF WBC: CPT

## 2024-08-13 PROCEDURE — 36415 COLL VENOUS BLD VENIPUNCTURE: CPT

## 2024-08-13 PROCEDURE — 80061 LIPID PANEL: CPT

## 2024-08-13 PROCEDURE — RXMED WILLOW AMBULATORY MEDICATION CHARGE: Performed by: INTERNAL MEDICINE

## 2024-08-13 PROCEDURE — 82306 VITAMIN D 25 HYDROXY: CPT

## 2024-08-13 PROCEDURE — 84443 ASSAY THYROID STIM HORMONE: CPT

## 2024-08-13 RX ORDER — ALBUTEROL SULFATE 90 UG/1
2 AEROSOL, METERED RESPIRATORY (INHALATION) EVERY 4 HOURS PRN
Qty: 8.5 G | Refills: 3 | Status: SHIPPED | OUTPATIENT
Start: 2024-08-13

## 2024-08-13 RX ORDER — CHOLESTYRAMINE 4 G/9G
POWDER, FOR SUSPENSION ORAL
Qty: 2268 G | Refills: 2 | Status: SHIPPED | OUTPATIENT
Start: 2024-08-13

## 2024-08-13 RX ORDER — ALBUTEROL SULFATE 0.83 MG/ML
2.5 SOLUTION RESPIRATORY (INHALATION) EVERY 4 HOURS PRN
Qty: 75 ML | Refills: 2 | Status: SHIPPED | OUTPATIENT
Start: 2024-08-13

## 2024-08-15 ENCOUNTER — TELEMEDICINE (OUTPATIENT)
Dept: SLEEP MEDICINE | Facility: MEDICAL CENTER | Age: 44
End: 2024-08-15
Attending: STUDENT IN AN ORGANIZED HEALTH CARE EDUCATION/TRAINING PROGRAM
Payer: MEDICARE

## 2024-08-15 ENCOUNTER — PHARMACY VISIT (OUTPATIENT)
Dept: PHARMACY | Facility: MEDICAL CENTER | Age: 44
End: 2024-08-15
Payer: COMMERCIAL

## 2024-08-15 VITALS — BODY MASS INDEX: 26.95 KG/M2 | WEIGHT: 210 LBS | HEIGHT: 74 IN

## 2024-08-15 DIAGNOSIS — F41.9 ANXIETY: ICD-10-CM

## 2024-08-15 DIAGNOSIS — G47.33 OSA (OBSTRUCTIVE SLEEP APNEA): Primary | ICD-10-CM

## 2024-08-15 DIAGNOSIS — Z96.82 S/P INSERTION OF HYPOGLOSSAL NERVE STIMULATOR: ICD-10-CM

## 2024-08-15 DIAGNOSIS — F51.04 CHRONIC INSOMNIA: ICD-10-CM

## 2024-08-15 PROCEDURE — 99214 OFFICE O/P EST MOD 30 MIN: CPT | Mod: 95 | Performed by: STUDENT IN AN ORGANIZED HEALTH CARE EDUCATION/TRAINING PROGRAM

## 2024-08-15 PROCEDURE — RXMED WILLOW AMBULATORY MEDICATION CHARGE: Performed by: STUDENT IN AN ORGANIZED HEALTH CARE EDUCATION/TRAINING PROGRAM

## 2024-08-15 RX ORDER — GABAPENTIN 100 MG/1
CAPSULE ORAL
Qty: 102 CAPSULE | Refills: 0 | Status: SHIPPED | OUTPATIENT
Start: 2024-08-15 | End: 2024-09-15

## 2024-08-15 ASSESSMENT — FIBROSIS 4 INDEX: FIB4 SCORE: 0.73

## 2024-08-15 NOTE — PROGRESS NOTES
[facility] Virtual Follow Up Note     Date: 2024 / Time: 7:38 AM    CC: Telemedicine Follow-up regarding management of obstructive sleep apnea and insomnia    This sleep consultation is provided using Telemedicine and secure encrypted software. Consent was obtained.    Consulting MD: Jamie Osborn M.D.  Requesting Physician: Charu Ardon M.D.  Patient name:      Abel Webster  : 1980  MRN: [id number]     This visit was conducted via Teams using secure and encrypted videoconferencing technology.   The patient was in a private location at home in the [facility].    The patient's identity was confirmed and verbal consent was obtained for this virtual visit.      HISTORY OF PRESENT ILLNESS:     Abel Webster is a 44 y.o.male  with asthma, MDD, GERD, chronic insomnia, delayed sleep phase, daytime sleepiness, and obstructive sleep apnea status post hypoglossal nerve stimulator.  Presents today to follow-up regarding management of obstructive sleep apnea chronic insomnia.    He states he continues to feel tired during the day.  He continues to have difficulty getting a quality night sleep.  He found that the trazodone was making him too groggy the next day and he has since discontinued it.  Without the use of trazodone he finds that his device is waking him up.  Therefore he has not been using his device consistently since last visit.  He states he was too tired to drive into the office today and preferred to do a virtual visit.    He has tried multiple different medications in the past regarding sleep.  Most recently he has tried Lunesta, Dayvigo and trazodone.    In the past he has tried gabapentin for pain.  This was in 2016 and 2017.  He believes he was taking it during the day and he does not remember exactly why he stopped it.  He states he is open to trying different medications for sleep.    He reports since last visit there has been life stressors as well.  His  wife was hospitalized following COVID-19 infection.  In addition one of his wife's family numbers has not been doing well healthwise and they have had to commute to help out to [place].    Sleep History  8/31/2022 PSG showed moderate obstructive sleep apnea with an overall AHI of 17 events an hour, minimum oxygen saturation 81%, time at or below 88% saturation 48 minutes  4/15/2024 PSG inspire fine-tune study showed an overall AHI of 15.7 events an hour.  Time at or below 88% saturation of 54 minutes.  Towards end of study at 1.2 V 105 minutes of sleep was seen with an overall AHI of 1.7 V.  This was in the lateral position.  Oxygen saturations normalized during this period.      MEDICAL HISTORY  Past Medical History:   Diagnosis Date    Arthropathy     Asthma 02/13/2023    inhalers daily and prn    Chickenpox     Chronic fatigue     Chronic pain 02/13/2023    back, buttocks    Disorder of thyroid 02/13/2023    medicated    Fatty liver     GERD (gastroesophageal reflux disease)     Heart burn 02/13/2023    medicated    Hypoplasia of mandible     IBS (irritable bowel syndrome) 02/13/2023    medicated    Maxillary hypoplasia     Sleep apnea 02/13/2023    positive JAYESH, but is unable to use CPAP        SURGICAL HISTORY  Past Surgical History:   Procedure Laterality Date    [place] OPEN IMPLTJ HPGLSL NRV NSTIM RA PG&RESPIR SENSOR Right 3/6/2023    Procedure: INSERTION OF HYPOGLOSSAL NERVE NEUROSTIMULATOR ELECTRODE AND GENERATOR AND BREATHING SENSOR ELECTRODE RIGHT;  Surgeon: Pedro Wen M.D.;  Location: SURGERY SAME DAY [facility];  Service: Ent    OTHER  02/13/2023 11/2021 septoplasty    OTHER  02/13/2023 2021 Hard palate expander placed    OTHER ABDOMINAL SURGERY          FAMILY HISTORY  Family History   Problem Relation Age of Onset    Colorectal Cancer Paternal Uncle         after age 50       SOCIAL HISTORY  Social History     Socioeconomic History    Marital status:    Tobacco Use    Smoking status:  Never    Smokeless tobacco: Never   Vaping Use    Vaping status: Never Used   Substance and Sexual Activity    Alcohol use: Yes     Comment: very rarely    Drug use: Never    Sexual activity: Yes     Partners: Female     Birth control/protection: None        CURRENT MEDICATIONS  Current Outpatient Medications   Medication Sig Dispense Refill    gabapentin (NEURONTIN) 100 MG Cap Take 1 Capsule by mouth every evening for 3 days, THEN 2 Capsules every evening for 3 days, THEN 3 Capsules every evening for 3 days, THEN 4 Capsules every evening for 21 days. Take at bedtime. May stop at lowest effective dose 102 Capsule 0    cholestyramine (QUESTRAN) 4 GM/DOSE powder MIX 1 SCOOP FULL in liquid and drink TWICE A DAY (PT PREFERS CANS NDC 7043-4568-91) 2268 g 2    albuterol 108 (90 Base) MCG/ACT Aero Soln inhalation aerosol Inhale 2 Puffs every four hours as needed for Shortness of Breath. 8.5 g 3    albuterol (PROVENTIL) 2.5mg/3ml Nebu Soln solution for nebulization Inhale 1 vial (3 mL) by nebulization every four hours as needed for Shortness of Breath. 75 mL 2    Albuterol Sulfate 108 (90 Base) MCG/ACT AEROSOL POWDER, BREATH ACTIVATED Inhale 1-2 puffs 4 times a day as needed (shortness of breath / wheezing). 1 Each 5    fluticasone-umeclidinium-vilanterol (TRELEGY ELLIPTA) 200-62.5-25 mcg/act inhaler Inhale 1 Puff by mouth every day. 60 Each 5    traZODone (DESYREL) 50 MG Tab Take 2 Tablets by mouth every evening for 90 days. 180 Tablet 0    Armodafinil 250 MG Tab Take 250 mg by mouth every day for 90 days. 90 Tablet 0    levothyroxine (SYNTHROID) 25 MCG Tab Take 1 Tablet by mouth every morning on an empty stomach. 90 Tablet 1    ergocalciferol (DRISDOL) 61768 UNIT capsule Take 1 Capsule by mouth every 7 days. 12 Capsule 0    pantoprazole (PROTONIX) 40 MG Tablet Delayed Response Take 1 Tablet by mouth every day. 90 Tablet 3    VITAMIN D PO Take  by mouth every day.      B Complex Vitamins (VITAMIN B COMPLEX PO) Take  by  "mouth every day.      ondansetron (ZOFRAN ODT) 4 MG TABLET DISPERSIBLE Take 4 mg by mouth every 6 hours as needed for Nausea.       No current facility-administered medications for this visit.       REVIEW OF SYSTEMS  Constitutional: Denies fevers, Denies weight changes  Ears/Nose/Throat/Mouth: Denies nasal congestion or sore throat   Cardiovascular: Denies chest pain  Respiratory: Denies shortness of breath, Denies cough  Gastrointestinal/Hepatic: Denies nausea, vomiting  Sleep: see HPI      Physical Examination:  Vitals/ General Appearance:   Weight/BMI: Body mass index is 26.96 kg/m².  Ht 1.88 m (6' 2\")   Wt 95.3 kg (210 lb)   Vitals:    08/15/24 1517   Weight: 95.3 kg (210 lb)   Height: 1.88 m (6' 2\")       General: alert and oriented to time, place and person. Cooperative and in no apparent distress.   Constitutional: Alert, no distress, well-groomed.  Voice: normal volume and roxanna  Head: normocephalic   Pulmonary: Voice normal volume and roxanna. No sounds or signs of increased work of breathing.   Neurologic: No involuntary movements     Assessment and Plan  Abel Webster is a 44 y.o.male  with asthma, MDD, GERD, chronic insomnia, delayed sleep phase, daytime sleepiness, and obstructive sleep apnea status post hypoglossal nerve stimulator.  Presents today to follow-up regarding management of obstructive sleep apnea chronic insomnia.    The medical record was reviewed.    Obstructive sleep apnea  Discussed hypoglossal nerve stimulation.  Advised that if he continues to not use his device he may lose tolerance even further.  Reviewed that on his previous in lab sleep study his device does control his apneas.  Encouraged regular usage of his hypoglossal nerve stimulator    PLAN:   -Continue inspire device nightly  -Advised to reach out via MyChart with questions     Patients with JAYESH are at increased risk of cardiovascular disease including coronary artery disease, systemic arterial hypertension, " "pulmonary arterial hypertension, cardiac arrythmias, and stroke. The patient was advised to avoid driving a motor vehicle when drowsy.    Chronic insomnia  Patient continues to have frequent nocturnal awakenings.  He has tried Dayvigo, Lunesta and trazodone.  He found that the daytime sleepiness with all these medications the following day where he felt like a \"zombie\" led to him discontinuing medications.  He is open to trying new medication.  Discussed alternatives.  Given his difficulty with staying asleep and feeling as though he is not getting a deep sleep discussed gabapentin.  Advised that gabapentin can be used to help increase stage III sleep.  In addition taking it only at night may lead to not being groggy during the day.  Given dosages of gabapentin would be able to start at 100 mg and slowly increase.  Advised him to stop at lowest effective dose.    Plan  -Start gabapentin 100 mg at bedtime may increase in 100 mg increments every 3 days.  Advised not to go above 400 mg.    Return in about 7 weeks (around 10/3/2024).    Please note portions of this record was created using voice recognition software. I have made every reasonable attempt to correct obvious errors, but I expect that there are errors of grammar and possibly content I did not discover before finalizing the note.    "

## 2024-08-16 ENCOUNTER — PHARMACY VISIT (OUTPATIENT)
Dept: PHARMACY | Facility: MEDICAL CENTER | Age: 44
End: 2024-08-16
Payer: COMMERCIAL

## 2024-09-06 ENCOUNTER — APPOINTMENT (OUTPATIENT)
Dept: MEDICAL GROUP | Facility: MEDICAL CENTER | Age: 44
End: 2024-09-06
Payer: MEDICARE

## 2024-09-06 VITALS — WEIGHT: 220 LBS | HEIGHT: 74 IN | BODY MASS INDEX: 28.23 KG/M2

## 2024-09-06 DIAGNOSIS — G47.33 OBSTRUCTIVE SLEEP APNEA: ICD-10-CM

## 2024-09-06 DIAGNOSIS — J45.40 MODERATE PERSISTENT ASTHMA WITHOUT COMPLICATION: ICD-10-CM

## 2024-09-06 DIAGNOSIS — K21.9 GASTROESOPHAGEAL REFLUX DISEASE, UNSPECIFIED WHETHER ESOPHAGITIS PRESENT: ICD-10-CM

## 2024-09-06 DIAGNOSIS — L30.4 INTERTRIGO: ICD-10-CM

## 2024-09-06 DIAGNOSIS — E03.8 SUBCLINICAL HYPOTHYROIDISM: ICD-10-CM

## 2024-09-06 DIAGNOSIS — R74.01 ALT (SGPT) LEVEL RAISED: ICD-10-CM

## 2024-09-06 DIAGNOSIS — R53.82 CHRONIC FATIGUE: ICD-10-CM

## 2024-09-06 DIAGNOSIS — R73.01 IFG (IMPAIRED FASTING GLUCOSE): ICD-10-CM

## 2024-09-06 DIAGNOSIS — G47.09 OTHER INSOMNIA: ICD-10-CM

## 2024-09-06 DIAGNOSIS — E78.00 PURE HYPERCHOLESTEROLEMIA: ICD-10-CM

## 2024-09-06 DIAGNOSIS — E55.9 VITAMIN D DEFICIENCY: ICD-10-CM

## 2024-09-06 DIAGNOSIS — Z96.82 S/P PLACEMENT OF HYPOGLOSSAL NERVE STIMULATOR: ICD-10-CM

## 2024-09-06 PROCEDURE — RXMED WILLOW AMBULATORY MEDICATION CHARGE: Performed by: INTERNAL MEDICINE

## 2024-09-06 PROCEDURE — 99214 OFFICE O/P EST MOD 30 MIN: CPT | Mod: 95 | Performed by: INTERNAL MEDICINE

## 2024-09-06 RX ORDER — LEVOTHYROXINE SODIUM 25 UG/1
25 TABLET ORAL
Qty: 90 TABLET | Refills: 3 | Status: SHIPPED | OUTPATIENT
Start: 2024-09-06

## 2024-09-06 RX ORDER — PANTOPRAZOLE SODIUM 40 MG/1
40 TABLET, DELAYED RELEASE ORAL DAILY
Qty: 90 TABLET | Refills: 3 | Status: SHIPPED | OUTPATIENT
Start: 2024-09-06

## 2024-09-06 RX ORDER — ARMODAFINIL 250 MG/1
250 TABLET ORAL DAILY
Qty: 30 TABLET | Refills: 0 | Status: SHIPPED | OUTPATIENT
Start: 2024-09-06 | End: 2024-10-10

## 2024-09-06 RX ORDER — ERGOCALCIFEROL 1.25 MG/1
50000 CAPSULE ORAL
Qty: 12 CAPSULE | Refills: 3 | Status: SHIPPED | OUTPATIENT
Start: 2024-09-06 | End: 2024-09-06

## 2024-09-06 RX ORDER — FLUTICASONE FUROATE, UMECLIDINIUM BROMIDE AND VILANTEROL TRIFENATATE 200; 62.5; 25 UG/1; UG/1; UG/1
1 POWDER RESPIRATORY (INHALATION) DAILY
Qty: 180 EACH | Refills: 3 | Status: SHIPPED | OUTPATIENT
Start: 2024-09-06

## 2024-09-06 ASSESSMENT — FIBROSIS 4 INDEX: FIB4 SCORE: 0.73

## 2024-09-06 NOTE — PROGRESS NOTES
Virtual Visit: Established Patient   This visit was conducted via Teams using secure and encrypted videoconferencing technology. The patient was in a private location in the state of [place].    The patient's identity was confirmed and verbal consent was obtained for this virtual visit.    Subjective:   CC:   Chief Complaint   Patient presents with    Lab Follow-up       Abel Webster is a 44 y.o. male presenting for evaluation and management of above:    He is still struggling with sleep and chronic fatigue.  He still would like to assess additional opinion about his sleep problems.        ROS    As above    No Known Allergies    Current medicines (including changes today)  Current Outpatient Medications   Medication Sig Dispense Refill    pantoprazole (PROTONIX) 40 MG Tablet Delayed Response Take 1 Tablet by mouth every day. 90 Tablet 3    fluticasone-umeclidinium-vilanterol (TRELEGY ELLIPTA) 200-62.5-25 mcg/act inhaler Inhale 1 Puff by mouth every day. 180 Each 3    levothyroxine (SYNTHROID) 25 MCG Tab Take 1 Tablet by mouth every morning on an empty stomach. 90 Tablet 3    Armodafinil 250 MG Tab Take 1 tablet (250 mg) by mouth every day for 30 days. 30 Tablet 0    gabapentin (NEURONTIN) 100 MG Cap Take 1 Capsule by mouth every evening for 3 days, THEN 2 Capsules every evening for 3 days, THEN 3 Capsules every evening for 3 days, THEN 4 Capsules every evening for 21 days. Take at bedtime. May stop at lowest effective dose 102 Capsule 0    cholestyramine (QUESTRAN) 4 GM/DOSE powder MIX 1 SCOOP FULL in liquid and drink TWICE A DAY (PT PREFERS CANS NDC 2032-0364-46) 2268 g 2    albuterol 108 (90 Base) MCG/ACT Aero Soln inhalation aerosol Inhale 2 Puffs every four hours as needed for Shortness of Breath. 8.5 g 3    albuterol (PROVENTIL) 2.5mg/3ml Nebu Soln solution for nebulization Inhale 1 vial (3 mL) by nebulization every four hours as needed for Shortness of Breath. 75 mL 2    Albuterol Sulfate 108 (90 Base)  "MCG/ACT AEROSOL POWDER, BREATH ACTIVATED Inhale 1-2 puffs 4 times a day as needed (shortness of breath / wheezing). 1 Each 5    VITAMIN D PO Take  by mouth every day.      B Complex Vitamins (VITAMIN B COMPLEX PO) Take  by mouth every day.      ondansetron (ZOFRAN ODT) 4 MG TABLET DISPERSIBLE Take 4 mg by mouth every 6 hours as needed for Nausea.       No current facility-administered medications for this visit.       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    Hypertrophy, nasal, turbinate 07/03/2019    Fatty liver 11/07/2018    Migraines 11/07/2018    Asthma 11/07/2018    Jaw pain 07/18/2017    Hemorrhoids 03/15/2017    Gastroesophageal reflux disease 03/14/2017    Major depressive disorder, recurrent episode (HCC) 08/31/2016    Arthropathy of lumbar facet joint 07/12/2016    Chronic low back pain 02/14/2016    Obstructive sleep apnea 09/09/2015    Plantar fasciitis, bilateral 08/19/2015    Anal pain 05/12/2015    Irritable bowel syndrome 11/17/2011    Allergic rhinitis 05/24/1996          Objective:   Ht 1.88 m (6' 2\")   Wt 99.8 kg (220 lb) Comment: pt reported, virtual  BMI 28.25 kg/m²     Physical Exam:   Constitutional: Alert, no distress, well-groomed.  Skin: No rashes in visible areas.  Eye: Round. Conjunctiva clear, lids normal. No icterus.   ENMT: Lips pink without lesions, moist mucous membranes. Phonation normal.  Neck: No visible masses   Respiratory: Unlabored respiratory effort, no cough or audible wheeze  Psych: Alert and oriented x3, normal affect and mood.       Assessment and Plan:   The following treatment plan was discussed:     1. Pure hypercholesterolemia  Getting better close to normal with cholestyramine.  Continue.    2. IFG " (impaired fasting glucose)  Stable.  Continue healthy lifestyle.    3. Vitamin D deficiency  Better.  Continue current OTC supplement 2000 IU daily.    4. Subclinical hypothyroidism  Stable with low-dose levothyroxine.  Continue  - levothyroxine (SYNTHROID) 25 MCG Tab; Take 1 Tablet by mouth every morning on an empty stomach.  Dispense: 90 Tablet; Refill: 3    5. Obstructive sleep apnea  6. Other insomnia  7. S/P placement of hypoglossal nerve stimulator  Discussed that this is beyond my scope of practice.  Refer to sleep medicine for additional opinion.  - Referral to Pulmonary and Sleep Medicine    8. Chronic fatigue  He relies on armodafinil for fatigue currently.   reviewed.  Refilled.  - Armodafinil 250 MG Tab; Take 1 tablet (250 mg) by mouth every day for 30 days.  Dispense: 30 Tablet; Refill: 0    9. Moderate persistent asthma without complication  He is doing much better on Trelegy.  Continue.  - fluticasone-umeclidinium-vilanterol (TRELEGY ELLIPTA) 200-62.5-25 mcg/act inhaler; Inhale 1 Puff by mouth every day.  Dispense: 180 Each; Refill: 3    10. Gastroesophageal reflux disease, unspecified whether esophagitis present  Stable.  Continue  - pantoprazole (PROTONIX) 40 MG Tablet Delayed Response; Take 1 Tablet by mouth every day.  Dispense: 90 Tablet; Refill: 3    11. ALT (SGPT) level raised  mild and stable.  Focus on cholesterol control.    12. Intertrigo  He remembered using antifungal cream for axillary fungal rash.  He would like to aend a picture and let me take a look to prescribe fungal cream.    Follow-up: Return in about 2 months (around 11/6/2024). With new PCP

## 2024-09-10 ENCOUNTER — PHARMACY VISIT (OUTPATIENT)
Dept: PHARMACY | Facility: MEDICAL CENTER | Age: 44
End: 2024-09-10
Payer: COMMERCIAL

## 2024-09-26 ENCOUNTER — PATIENT MESSAGE (OUTPATIENT)
Dept: MEDICAL GROUP | Facility: MEDICAL CENTER | Age: 44
End: 2024-09-26
Payer: COMMERCIAL

## 2024-09-26 DIAGNOSIS — L08.1 ERYTHRASMA: ICD-10-CM

## 2024-09-27 PROCEDURE — RXMED WILLOW AMBULATORY MEDICATION CHARGE: Performed by: INTERNAL MEDICINE

## 2024-09-27 RX ORDER — ERYTHROMYCIN 20 MG/G
GEL TOPICAL
Qty: 60 G | Refills: 0 | Status: SHIPPED | OUTPATIENT
Start: 2024-09-27

## 2024-10-03 ENCOUNTER — OFFICE VISIT (OUTPATIENT)
Dept: SLEEP MEDICINE | Facility: MEDICAL CENTER | Age: 44
End: 2024-10-03
Attending: STUDENT IN AN ORGANIZED HEALTH CARE EDUCATION/TRAINING PROGRAM
Payer: COMMERCIAL

## 2024-10-03 VITALS
OXYGEN SATURATION: 97 % | DIASTOLIC BLOOD PRESSURE: 84 MMHG | HEART RATE: 118 BPM | BODY MASS INDEX: 26.59 KG/M2 | RESPIRATION RATE: 16 BRPM | SYSTOLIC BLOOD PRESSURE: 122 MMHG | HEIGHT: 74 IN | WEIGHT: 207.2 LBS

## 2024-10-03 DIAGNOSIS — Z96.82 S/P INSERTION OF HYPOGLOSSAL NERVE STIMULATOR: ICD-10-CM

## 2024-10-03 DIAGNOSIS — G47.33 OSA (OBSTRUCTIVE SLEEP APNEA): Primary | ICD-10-CM

## 2024-10-03 DIAGNOSIS — G47.19 EXCESSIVE DAYTIME SLEEPINESS: ICD-10-CM

## 2024-10-03 PROCEDURE — 3079F DIAST BP 80-89 MM HG: CPT | Performed by: STUDENT IN AN ORGANIZED HEALTH CARE EDUCATION/TRAINING PROGRAM

## 2024-10-03 PROCEDURE — 99212 OFFICE O/P EST SF 10 MIN: CPT | Performed by: STUDENT IN AN ORGANIZED HEALTH CARE EDUCATION/TRAINING PROGRAM

## 2024-10-03 PROCEDURE — 95976 ALYS SMPL CN NPGT PRGRMG: CPT | Performed by: STUDENT IN AN ORGANIZED HEALTH CARE EDUCATION/TRAINING PROGRAM

## 2024-10-03 PROCEDURE — 3074F SYST BP LT 130 MM HG: CPT | Performed by: STUDENT IN AN ORGANIZED HEALTH CARE EDUCATION/TRAINING PROGRAM

## 2024-10-03 PROCEDURE — 99214 OFFICE O/P EST MOD 30 MIN: CPT | Mod: 25 | Performed by: STUDENT IN AN ORGANIZED HEALTH CARE EDUCATION/TRAINING PROGRAM

## 2024-10-03 ASSESSMENT — FIBROSIS 4 INDEX: FIB4 SCORE: 0.73

## 2024-10-04 ENCOUNTER — PHARMACY VISIT (OUTPATIENT)
Dept: PHARMACY | Facility: MEDICAL CENTER | Age: 44
End: 2024-10-04
Payer: COMMERCIAL

## 2024-10-26 ENCOUNTER — OFFICE VISIT (OUTPATIENT)
Dept: URGENT CARE | Facility: CLINIC | Age: 44
End: 2024-10-26
Payer: MEDICARE

## 2024-10-26 VITALS
DIASTOLIC BLOOD PRESSURE: 78 MMHG | OXYGEN SATURATION: 97 % | BODY MASS INDEX: 28.04 KG/M2 | WEIGHT: 207 LBS | HEIGHT: 72 IN | SYSTOLIC BLOOD PRESSURE: 116 MMHG | RESPIRATION RATE: 16 BRPM | TEMPERATURE: 97.6 F | HEART RATE: 81 BPM

## 2024-10-26 DIAGNOSIS — R53.1 WEAKNESS: ICD-10-CM

## 2024-10-26 PROCEDURE — 3074F SYST BP LT 130 MM HG: CPT | Performed by: STUDENT IN AN ORGANIZED HEALTH CARE EDUCATION/TRAINING PROGRAM

## 2024-10-26 PROCEDURE — 99214 OFFICE O/P EST MOD 30 MIN: CPT | Performed by: STUDENT IN AN ORGANIZED HEALTH CARE EDUCATION/TRAINING PROGRAM

## 2024-10-26 PROCEDURE — 3078F DIAST BP <80 MM HG: CPT | Performed by: STUDENT IN AN ORGANIZED HEALTH CARE EDUCATION/TRAINING PROGRAM

## 2024-10-26 ASSESSMENT — FIBROSIS 4 INDEX: FIB4 SCORE: 0.73

## 2024-11-15 ENCOUNTER — OFFICE VISIT (OUTPATIENT)
Dept: MEDICAL GROUP | Facility: PHYSICIAN GROUP | Age: 44
End: 2024-11-15
Payer: COMMERCIAL

## 2024-11-15 VITALS
BODY MASS INDEX: 27.21 KG/M2 | OXYGEN SATURATION: 98 % | RESPIRATION RATE: 16 BRPM | HEART RATE: 79 BPM | HEIGHT: 74 IN | SYSTOLIC BLOOD PRESSURE: 110 MMHG | DIASTOLIC BLOOD PRESSURE: 72 MMHG | TEMPERATURE: 96.7 F | WEIGHT: 212 LBS

## 2024-11-15 DIAGNOSIS — G47.33 OBSTRUCTIVE SLEEP APNEA: ICD-10-CM

## 2024-11-15 DIAGNOSIS — E03.9 HYPOTHYROIDISM, UNSPECIFIED TYPE: ICD-10-CM

## 2024-11-15 DIAGNOSIS — K21.9 GASTROESOPHAGEAL REFLUX DISEASE, UNSPECIFIED WHETHER ESOPHAGITIS PRESENT: ICD-10-CM

## 2024-11-15 DIAGNOSIS — J45.20 MILD INTERMITTENT ASTHMA WITHOUT COMPLICATION: ICD-10-CM

## 2024-11-15 DIAGNOSIS — K62.89 ANAL PAIN: ICD-10-CM

## 2024-11-15 PROCEDURE — 3074F SYST BP LT 130 MM HG: CPT | Performed by: NURSE PRACTITIONER

## 2024-11-15 PROCEDURE — 3078F DIAST BP <80 MM HG: CPT | Performed by: NURSE PRACTITIONER

## 2024-11-15 PROCEDURE — 99214 OFFICE O/P EST MOD 30 MIN: CPT | Performed by: NURSE PRACTITIONER

## 2024-11-15 ASSESSMENT — ENCOUNTER SYMPTOMS
DEPRESSION: 0
SHORTNESS OF BREATH: 0
HEADACHES: 0
VOMITING: 0
INSOMNIA: 1
CHILLS: 0
CONSTIPATION: 0
NERVOUS/ANXIOUS: 0
DIZZINESS: 0
WEIGHT LOSS: 0
ABDOMINAL PAIN: 0
FEVER: 0
NAUSEA: 0
EYES NEGATIVE: 1
PALPITATIONS: 0
MUSCULOSKELETAL NEGATIVE: 1
HEARTBURN: 0
DIARRHEA: 0
GASTROINTESTINAL NEGATIVE: 1

## 2024-11-15 ASSESSMENT — FIBROSIS 4 INDEX: FIB4 SCORE: 0.73

## 2024-11-15 NOTE — PROGRESS NOTES
Verbal consent was acquired by the patient to use [employer] ambient listening note generation during this visit     CC:  Chief Complaint   Patient presents with    [facility]     Sleep apnea        Abel Webster 44 y.o. male  patient presenting for     History of Present Illness  The patient is a 44-year-old male who presents today to establish care.    Sleep Apnea  Follows with pulmonology, Dr Osborn. Has aspire device implanted.   He has been dealing with sleep apnea for over a decade, which has been affecting his daily activities. He has tried various medications to help him sleep through the night, but none have been effective. He has had many difficulties with CPAP and also his current Aspire device as it wakes him up at night due to the sound and he has to adjust the device constantly occasionally resulting in him turning it off. Has an upcoming appointment with an ENT specialist. He believes his major depression will improve once his sleep apnea is addressed.    Asthma  He has been using an albuterol inhaler as needed, but its use has decreased since starting Trelegy. Previously, he was on Symbicort for several years but continued to experience coughing and wheezing. He lives in a basement of a century-old building in [place], which lacks proper airflow. He only uses the nebulizer during fire season. He has seen Dr. Osborn for pulmonology issues.    GERD  He has also been experiencing acid reflux for a long time, which is better managed with pantoprazole than omeprazole. He tries to avoid eating right before bed. He has not seen a gastroenterologist for his swallowing issues, but his reflux is under control with medication.    Rectal Pain   He has been experiencing pain in his rectum, which has not been diagnosed despite seeing multiple doctors. He has had numerous colonoscopies and sigmoidoscopies, all of which were normal. He has tried lidocaine ointment for his rectal pain, which was  "ineffective.    Plantar Fasciitis  He has had plantar fasciitis for 5 years, which is now resolved since getting more supportive shoes.     His current medications include vitamin B12, D3, Questran powder (once at night), erythromycin with ethanol gel (applied to his armpit), levothyroxine (25 mcg daily), pantoprazole, Trelegy (at night), and armodafinil in the morning (half a pill upon waking and the other half a few hours later).        Review of Systems   Constitutional:  Positive for malaise/fatigue. Negative for chills, fever and weight loss.   HENT: Negative.     Eyes: Negative.    Respiratory:  Negative for shortness of breath.    Cardiovascular:  Negative for chest pain and palpitations.   Gastrointestinal: Negative.  Negative for abdominal pain, constipation, diarrhea, heartburn, nausea and vomiting.   Genitourinary: Negative.    Musculoskeletal: Negative.    Skin: Negative.    Neurological:  Negative for dizziness and headaches.   Psychiatric/Behavioral:  Negative for depression. The patient has insomnia. The patient is not nervous/anxious.        /72   Pulse 79   Temp 35.9 °C (96.7 °F) (Temporal)   Resp 16   Ht 1.88 m (6' 2\")   Wt 96.2 kg (212 lb)   SpO2 98% , Body mass index is 27.22 kg/m².    Physical Exam  Constitutional:       General: He is not in acute distress.     Appearance: Normal appearance. He is not ill-appearing.   HENT:      Head: Normocephalic and atraumatic.      Right Ear: Tympanic membrane, ear canal and external ear normal.      Left Ear: Tympanic membrane, ear canal and external ear normal.      Nose: Nose normal.      Mouth/Throat:      Mouth: Mucous membranes are dry.      Pharynx: Oropharynx is clear.   Eyes:      Extraocular Movements: Extraocular movements intact.      Conjunctiva/sclera: Conjunctivae normal.      Pupils: Pupils are equal, round, and reactive to light.   Cardiovascular:      Rate and Rhythm: Normal rate and regular rhythm.      Pulses: Normal pulses. "      Heart sounds: Normal heart sounds.   Pulmonary:      Effort: Pulmonary effort is normal.      Breath sounds: Normal breath sounds.   Abdominal:      General: Abdomen is flat. Bowel sounds are normal.      Palpations: Abdomen is soft.   Musculoskeletal:         General: Normal range of motion.      Cervical back: Normal range of motion.   Skin:     General: Skin is warm and dry.      Capillary Refill: Capillary refill takes less than 2 seconds.   Neurological:      Mental Status: He is alert and oriented to person, place, and time.   Psychiatric:         Mood and Affect: Mood normal.         Behavior: Behavior normal.         Thought Content: Thought content normal.         Judgment: Judgment normal.           Results  Laboratory Studies 2024  Kidney function . Thyroid level 3.11. Sodium 146, potassium 4.9. Glucose 95. Vitamin D level is good.    Assessment and Plan    Assessment & Plan     Asthma.  He reports using the albuterol inhaler less frequently since starting [employer]. He lives in a musty basement, which may contribute to his symptoms. He uses the nebulizer primarily during fire season and has  nebulizer solutions. He was advised to reach out for a refill if needed.    Hypothyroidism.  He takes levothyroxine 25 mcg daily. He was advised to take it separately from other medications and food by at least half an hour.    Gastroesophageal Reflux Disease (GERD).  He takes pantoprazole at night and avoids eating a couple of hours before bed, which helps control his symptoms.    Major Depression.  He believes that fixing his sleep apnea will alleviate his depression. Not interested in medication for this.denies SI/HI    Sleep Apnea.  He uses the Inspire device but finds it wakes him up at therapeutic levels. He has an upcoming appointment with ENT to discuss further adjustments. He is CPAP intolerant and is seeking additional options to manage his condition.             1. Obstructive sleep  apnea  Chronic and exacerbated condition   - Referral to Pulmonary and Sleep Medicine     2. Anal pain  Chronic and stable condition     3. Mild intermittent asthma without complication  Chronic and stable condition     4. Gastroesophageal reflux disease, unspecified whether esophagitis present  Chronic and stable condition      5. Hypothyroidism, unspecified type  Chronic and stable condition        Discussed with patient possible alternative diagnoses, patient is to take all medications as prescribed.     If symptoms persist FU w/PCP, if symptoms worsen go to emergency room.     If experiencing any side effects from prescribed medications reports to the office immediately or go to emergency room.    Reviewed indication, dosage, usage and potential adverse effects of prescribed medications.     Reviewed risks and benefits of treatment plan. Patient verbalizes understanding of all instruction and verbally agrees to plan.    Return for as needed.    This note was created using voice recognition software ([employer]). The accuracy of the dictation is limited by the abilities of the software. I have reviewed the note prior to signing, however some errors in grammar and context are still possible. If you have any questions related to this note please do not hesitate to contact our office.

## 2024-12-03 PROCEDURE — RXMED WILLOW AMBULATORY MEDICATION CHARGE: Performed by: INTERNAL MEDICINE

## 2024-12-06 ENCOUNTER — PHARMACY VISIT (OUTPATIENT)
Dept: PHARMACY | Facility: MEDICAL CENTER | Age: 44
End: 2024-12-06
Payer: COMMERCIAL

## 2024-12-10 ENCOUNTER — PHARMACY VISIT (OUTPATIENT)
Dept: PHARMACY | Facility: MEDICAL CENTER | Age: 44
End: 2024-12-10
Payer: COMMERCIAL

## 2024-12-10 ENCOUNTER — TELEPHONE (OUTPATIENT)
Dept: SLEEP MEDICINE | Facility: MEDICAL CENTER | Age: 44
End: 2024-12-10
Payer: COMMERCIAL

## 2024-12-12 ENCOUNTER — PHARMACY VISIT (OUTPATIENT)
Dept: PHARMACY | Facility: MEDICAL CENTER | Age: 44
End: 2024-12-12
Payer: COMMERCIAL

## 2024-12-12 PROCEDURE — RXMED WILLOW AMBULATORY MEDICATION CHARGE: Performed by: STUDENT IN AN ORGANIZED HEALTH CARE EDUCATION/TRAINING PROGRAM

## 2024-12-16 ENCOUNTER — PHARMACY VISIT (OUTPATIENT)
Dept: PHARMACY | Facility: MEDICAL CENTER | Age: 44
End: 2024-12-16
Payer: COMMERCIAL

## 2024-12-16 PROCEDURE — RXMED WILLOW AMBULATORY MEDICATION CHARGE: Performed by: PHYSICIAN ASSISTANT

## 2024-12-16 RX ORDER — CEFDINIR 300 MG/1
CAPSULE ORAL
Qty: 28 CAPSULE | Refills: 0 | OUTPATIENT
Start: 2024-12-16

## 2024-12-16 RX ORDER — METHYLPREDNISOLONE 4 MG/1
TABLET ORAL
Qty: 21 EACH | Refills: 0 | OUTPATIENT
Start: 2024-12-16

## 2024-12-19 ENCOUNTER — TELEPHONE (OUTPATIENT)
Dept: MEDICAL GROUP | Facility: PHYSICIAN GROUP | Age: 44
End: 2024-12-19
Payer: COMMERCIAL

## 2024-12-19 NOTE — TELEPHONE ENCOUNTER
Patient called ENT prescribed him antibiotics and he is on day 3 with an constant headache. He would like to know what to do? He states the ENT office is not answering.

## 2025-01-16 ENCOUNTER — TELEMEDICINE (OUTPATIENT)
Dept: SLEEP MEDICINE | Facility: MEDICAL CENTER | Age: 45
End: 2025-01-16
Attending: STUDENT IN AN ORGANIZED HEALTH CARE EDUCATION/TRAINING PROGRAM
Payer: COMMERCIAL

## 2025-01-16 DIAGNOSIS — G47.33 OSA (OBSTRUCTIVE SLEEP APNEA): Primary | ICD-10-CM

## 2025-01-16 DIAGNOSIS — G47.19 EXCESSIVE DAYTIME SLEEPINESS: ICD-10-CM

## 2025-01-16 PROCEDURE — RXMED WILLOW AMBULATORY MEDICATION CHARGE: Performed by: STUDENT IN AN ORGANIZED HEALTH CARE EDUCATION/TRAINING PROGRAM

## 2025-01-16 PROCEDURE — 99213 OFFICE O/P EST LOW 20 MIN: CPT | Performed by: STUDENT IN AN ORGANIZED HEALTH CARE EDUCATION/TRAINING PROGRAM

## 2025-01-16 PROCEDURE — 99214 OFFICE O/P EST MOD 30 MIN: CPT | Mod: 95 | Performed by: STUDENT IN AN ORGANIZED HEALTH CARE EDUCATION/TRAINING PROGRAM

## 2025-01-16 RX ORDER — ARMODAFINIL 250 MG/1
1 TABLET ORAL
Qty: 30 TABLET | Refills: 0 | Status: SHIPPED | OUTPATIENT
Start: 2025-01-16 | End: 2025-02-16

## 2025-01-16 NOTE — PROGRESS NOTES
[facility] Virtual Follow Up Note     Date: 2025 / Time: 3:08 PM    CC: Discussing sleep    This sleep consultation is provided using Telemedicine and secure encrypted software. Consent was obtained.    Consulting MD: Jamie Osborn M.D.  Requesting Physician: MAGDI Quiroz  Patient name:      Abel Webster  : 1980  MRN: [id number]     This visit was conducted via Teams using secure and encrypted videoconferencing technology.   The patient was in a private location at home in the [facility].    The patient's identity was confirmed and verbal consent was obtained for this virtual visit.      HISTORY OF PRESENT ILLNESS:     Abel Webster is a 44 y.o. male with GERD, chronic sinusitis, moderate persistent asthma, daytime tiredness, obstructive sleep apnea status post hypoglossal nerve stimulator.  Presents to Sleep Clinic for evaluation of sleep.     He reports starting in December he stopped using his inspire device.  He is seeing ENT who put him on a course of steroids and antibiotics.  This was due to chronic sinusitis.  During that time he was having significant side effects from antibiotic and felt that the inspire device was complicating his sleep even further.  He has completed his course of antibiotics and is planning to meet with ENT later this month to discuss potential balloon sinuplasty.  He is hopeful that if he can breathe better through his nose this may allow for the inspire device to be decreased slightly.  He is continuing to sleep on his side.    He states he is continue to work on weight loss and exercise.  He is in the process of transitioning to a new PCP.    He can have daytime sleepiness during the day.  On these days armodafinil does help him get through the day.  He does not take it every day.  Denies any side effects from medication.      MEDICAL HISTORY  Past Medical History:   Diagnosis Date    Arthropathy     Asthma 2023     inhalers daily and prn    Chickenpox     Chronic fatigue     Chronic pain 02/13/2023    back, buttocks    Disorder of thyroid 02/13/2023    medicated    Fatty liver     GERD (gastroesophageal reflux disease)     Heart burn 02/13/2023    medicated    Hypoplasia of mandible     IBS (irritable bowel syndrome) 02/13/2023    medicated    Maxillary hypoplasia     Sleep apnea 02/13/2023    positive JAYESH, but is unable to use CPAP        SURGICAL HISTORY  Past Surgical History:   Procedure Laterality Date    LA OPEN IMPLTJ HPGLSL NRV NSTIM RA PG&RESPIR SENSOR Right 3/6/2023    Procedure: INSERTION OF HYPOGLOSSAL NERVE NEUROSTIMULATOR ELECTRODE AND GENERATOR AND BREATHING SENSOR ELECTRODE RIGHT;  Surgeon: Pedro Wen M.D.;  Location: SURGERY SAME DAY [facility];  Service: Ent    OTHER  02/13/2023 11/2021 septoplasty    OTHER  02/13/2023 2021 Hard palate expander placed    OTHER ABDOMINAL SURGERY          FAMILY HISTORY  Family History   Problem Relation Age of Onset    Colorectal Cancer Paternal Uncle         after age 50       SOCIAL HISTORY  Social History     Socioeconomic History    Marital status:    Tobacco Use    Smoking status: Never    Smokeless tobacco: Never   Vaping Use    Vaping status: Never Used   Substance and Sexual Activity    Alcohol use: Yes     Comment: very rarely    Drug use: Never    Sexual activity: Yes     Partners: Female     Birth control/protection: None        CURRENT MEDICATIONS  Current Outpatient Medications   Medication Sig Dispense Refill    Armodafinil 250 MG Tab Take 1 Tablet by mouth 1 time a day as needed (sleepiness) for up to 30 days. Indications: Obstructive Sleep Apnea Syndrome 30 Tablet 0    cefdinir (OMNICEF) 300 MG Cap Take 1 capsule Orally every 12 hours for 14 days 28 Capsule 0    methylPREDNISolone (MEDROL) 4 MG Tablet Therapy Pack Take Orally As directed for 6 days 21 Each 0    erythromycin with ethanol (EMGEL) 2 % gel Apply to armpit area twice daily  x 3 weeks  60 g 0    pantoprazole (PROTONIX) 40 MG Tablet Delayed Response Take 1 Tablet by mouth every day. 90 Tablet 3    fluticasone-umeclidinium-vilanterol (TRELEGY ELLIPTA) 200-62.5-25 mcg/act inhaler Inhale 1 Puff by mouth every day. 180 Each 3    levothyroxine (SYNTHROID) 25 MCG Tab Take 1 Tablet by mouth every morning on an empty stomach. 90 Tablet 3    cholestyramine (QUESTRAN) 4 GM/DOSE powder MIX 1 SCOOP FULL in liquid and drink TWICE A DAY (PT PREFERS CANS NDC 8371-7978-74) 2268 g 2    albuterol 108 (90 Base) MCG/ACT Aero Soln inhalation aerosol Inhale 2 Puffs every four hours as needed for Shortness of Breath. 8.5 g 3    VITAMIN D PO Take  by mouth every day.      B Complex Vitamins (VITAMIN B COMPLEX PO) Take  by mouth every day.       No current facility-administered medications for this visit.       REVIEW OF SYSTEMS  Constitutional: Denies fevers, Denies weight changes  Ears/Nose/Throat/Mouth: Denies nasal congestion or sore throat   Cardiovascular: Denies chest pain  Respiratory: Denies shortness of breath, Denies cough  Gastrointestinal/Hepatic: Denies nausea, vomiting  Sleep: see HPI      Physical Examination:  Vitals/ General Appearance:   Weight/BMI: There is no height or weight on file to calculate BMI.  There were no vitals taken for this visit.  There were no vitals filed for this visit.    General: alert and oriented to time, place and person. Cooperative and in no apparent distress.   Constitutional: Alert, no distress, well-groomed.  Voice: normal volume and roxanna  Head: normocephalic   Pulmonary: Voice normal volume and roxanna. No sounds or signs of increased work of breathing.   Neurologic: No involuntary movements     Assessment and Plan  Abel Webster is a 44 y.o. male with GERD, chronic sinusitis, moderate persistent asthma, daytime tiredness, obstructive sleep apnea status post hypoglossal nerve stimulator.  Presents to Sleep Clinic for evaluation of sleep.     The medical record was  reviewed.    Obstructive sleep apnea  Currently not using hypoglossal nerve stimulator.  Discussed to continue to avoid sleeping in the supine position.  Advised that with continued use may lose tolerance at current levels which may need to be decreased further.  Once he feels ready would encourage restarting hypoglossal nerve stimulator potentially at lower levels.    Previous PSG has indicated control of sleep apnea with hypoglossal nerve stimulation in the lateral positions    PLAN:   -Continue to consider to use hypoglossal nerve stimulator  -Advised to reach out via [employer] with any questions     Patients with JAYESH are at increased risk of cardiovascular disease including coronary artery disease, systemic arterial hypertension, pulmonary arterial hypertension, cardiac arrythmias, and stroke.     Excessive daytime sleepiness  Patient reports continued use and benefit from armodafinil.  Denies any side effects of medication.  Advised that the daytime sleepiness may be worsened by not treating his sleep apnea.    Plan  -Refilled armodafinil 250 mg as needed once daily      Return in about 3 months (around 4/16/2025).      Please note portions of this record was created using voice recognition software. I have made every reasonable attempt to correct obvious errors, but I expect that there are errors of grammar and possibly content I did not discover before finalizing the note.

## 2025-01-17 ENCOUNTER — PHARMACY VISIT (OUTPATIENT)
Dept: PHARMACY | Facility: MEDICAL CENTER | Age: 45
End: 2025-01-17
Payer: COMMERCIAL

## 2025-01-30 ENCOUNTER — OFFICE VISIT (OUTPATIENT)
Dept: MEDICAL GROUP | Facility: MEDICAL CENTER | Age: 45
End: 2025-01-30
Payer: COMMERCIAL

## 2025-01-30 ENCOUNTER — PHARMACY VISIT (OUTPATIENT)
Dept: PHARMACY | Facility: MEDICAL CENTER | Age: 45
End: 2025-01-30
Payer: COMMERCIAL

## 2025-01-30 VITALS
SYSTOLIC BLOOD PRESSURE: 130 MMHG | WEIGHT: 207.23 LBS | HEART RATE: 100 BPM | OXYGEN SATURATION: 97 % | HEIGHT: 74 IN | DIASTOLIC BLOOD PRESSURE: 62 MMHG | TEMPERATURE: 98.5 F | BODY MASS INDEX: 26.6 KG/M2

## 2025-01-30 DIAGNOSIS — K58.0 IRRITABLE BOWEL SYNDROME WITH DIARRHEA: ICD-10-CM

## 2025-01-30 DIAGNOSIS — Z00.00 ENCOUNTER FOR PREVENTIVE CARE: ICD-10-CM

## 2025-01-30 DIAGNOSIS — R19.5 LOOSE STOOLS: ICD-10-CM

## 2025-01-30 DIAGNOSIS — G47.10 HYPERSOMNOLENCE: ICD-10-CM

## 2025-01-30 PROCEDURE — RXMED WILLOW AMBULATORY MEDICATION CHARGE: Performed by: OTOLARYNGOLOGY

## 2025-01-30 RX ORDER — DOXYCYCLINE 100 MG/1
CAPSULE ORAL
Qty: 42 CAPSULE | Refills: 0 | OUTPATIENT
Start: 2025-01-30

## 2025-01-30 RX ORDER — ALPRAZOLAM 2 MG/1
TABLET ORAL
Qty: 2 TABLET | Refills: 0 | OUTPATIENT
Start: 2025-01-30

## 2025-01-30 ASSESSMENT — ENCOUNTER SYMPTOMS
COUGH: 0
CHILLS: 0
PALPITATIONS: 0
HEMOPTYSIS: 0
FEVER: 0

## 2025-01-30 ASSESSMENT — FIBROSIS 4 INDEX: FIB4 SCORE: 0.73

## 2025-01-30 NOTE — PROGRESS NOTES
Abel Webster is a 44 y.o. old male  who is here to establish care     Chief Complaint   Patient presents with    Establish Care     New to You     Sleep Disruption     Sleep apnea    HPI  Patient has history of obstructive sleep apnea status post hypoglossal nerve stimulator.   He is planning to undergo Sinoplasty in March.  Sleep disturbances has been affecting his daily life and social life.  He does report nightmares.  He has tried modafinil for hypersomnolence before but stopped working and now is on armodafinil for past 6 to 7 years which is prescribed by sleep medicine.  He is currently on unemployment benefits due to his difficulty in working  due to sleep disturbances.   Review of Systems   Constitutional:  Negative for chills and fever.   Respiratory:  Negative for cough and hemoptysis.    Cardiovascular:  Negative for chest pain and palpitations.        Patient  has a past medical history of Arthropathy, Asthma (02/13/2023), Chickenpox, Chronic fatigue, Chronic pain (02/13/2023), Disorder of thyroid (02/13/2023), Fatty liver, GERD (gastroesophageal reflux disease), Heart burn (02/13/2023), Hypoplasia of mandible, IBS (irritable bowel syndrome) (02/13/2023), Maxillary hypoplasia, and Sleep apnea (02/13/2023).  Patient  has a past surgical history that includes other abdominal surgery; other (02/13/2023); other (02/13/2023); and pr open impltj hpglsl nrv nstim ra pg&respir sensor (Right, 3/6/2023).  Family History   Problem Relation Age of Onset    Colorectal Cancer Paternal Uncle         after age 50     Social History     Tobacco Use    Smoking status: Never    Smokeless tobacco: Never   Vaping Use    Vaping status: Never Used   Substance Use Topics    Alcohol use: Yes     Comment: very rarely    Drug use: Never     Patient Active Problem List    Diagnosis Date Noted    Loose stools 01/30/2025    Hypersomnolence 01/30/2025    Hypothyroid 11/15/2024    Skin lesion of face 05/20/2022    Low  testosterone 05/20/2022    Anal fissure 05/20/2022    Moderate persistent asthma without complication 05/20/2022    Chronic fatigue 02/10/2021    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    Hypertrophy, nasal, turbinate 07/03/2019    Fatty liver 11/07/2018    Migraines 11/07/2018    Asthma 11/07/2018    Jaw pain 07/18/2017    Hemorrhoids 03/15/2017    Gastroesophageal reflux disease 03/14/2017    Major depressive disorder, recurrent episode (HCC) 08/31/2016    Arthropathy of lumbar facet joint 07/12/2016    Chronic low back pain 02/14/2016    Obstructive sleep apnea 09/09/2015    Plantar fasciitis, bilateral 08/19/2015    Anal pain 05/12/2015    Irritable bowel syndrome 11/17/2011    Allergic rhinitis 05/24/1996     Current Outpatient Medications   Medication Sig Dispense Refill    doxycycline (VIBRAMYCIN) 100 MG Cap Take 1 capsule by mouth every 12 hours for 21 days 42 Capsule 0    alprazolam (XANAX) 2 MG tablet Take 1 tablet by mouth 2 hours before procedure, then take 1 tablet 1 hour before procedure as directed for 1 day 2 Tablet 0    Armodafinil 250 MG Tab Take 1 Tablet by mouth 1 time a day as needed (sleepiness) for up to 30 days. Indications: Obstructive Sleep Apnea Syndrome 30 Tablet 0    erythromycin with ethanol (EMGEL) 2 % gel Apply to armpit area twice daily  x 3 weeks 60 g 0    pantoprazole (PROTONIX) 40 MG Tablet Delayed Response Take 1 Tablet by mouth every day. 90 Tablet 3    fluticasone-umeclidinium-vilanterol (TRELEGY ELLIPTA) 200-62.5-25 mcg/act inhaler Inhale 1 Puff by mouth every day. 180 Each 3    levothyroxine (SYNTHROID) 25 MCG Tab Take 1 Tablet by mouth every morning on an empty stomach. 90 Tablet 3    cholestyramine (QUESTRAN) 4 GM/DOSE powder MIX 1 SCOOP FULL in liquid and drink TWICE A DAY (PT PREFERS CANS NDC 8740-7003-51) 2268 g 2    albuterol 108 (90 Base) MCG/ACT Aero Soln inhalation aerosol Inhale 2 Puffs every four hours as needed for Shortness of Breath.  "8.5 g 3    VITAMIN D PO Take  by mouth every day.       No current facility-administered medications for this visit.    (including changes today)  Allergies: Seasonal    /62 (BP Location: Left arm, Patient Position: Sitting, BP Cuff Size: Adult)   Pulse 100   Temp 36.9 °C (98.5 °F) (Temporal)   Ht 1.88 m (6' 2\")   Wt 94 kg (207 lb 3.7 oz)   SpO2 97%      Physical Exam  Constitutional:       Appearance: Normal appearance.   Cardiovascular:      Rate and Rhythm: Normal rate and regular rhythm.      Pulses: Normal pulses.      Heart sounds: Normal heart sounds. No murmur heard.  Pulmonary:      Effort: Pulmonary effort is normal. No respiratory distress.      Breath sounds: Normal breath sounds.   Neurological:      General: No focal deficit present.      Mental Status: He is alert and oriented to person, place, and time.          I reviewed with patient  lab results from 8/13/24    Assessment and plan:    Problem List Items Addressed This Visit       Irritable bowel syndrome    Loose stools     - He has irritable bowel syndrome and is on cholestyramine which helps with the symptoms.  He feels he may have a intolerance to gluten.         Relevant Orders    CELIAC DISEASE AB PANEL    Hypersomnolence     - Plan to undergo sinoplasty in March due to difficulty in nasal breathing.  Currently on armodafinil to 50 mg daily.          Other Visit Diagnoses       Encounter for preventive care        Relevant Orders    CBC WITH DIFFERENTIAL    Comp Metabolic Panel    Lipid Profile    VITAMIN D,25 HYDROXY (DEFICIENCY)                Return in about 3 months (around 4/30/2025) for Labfollowup.          Please note that this dictation was created using voice recognition software. I have made every reasonable attempt to correct obvious errors, but I expect that there are errors of grammar and possibly content that I did not discover before finalizing the note.    "

## 2025-01-31 NOTE — ASSESSMENT & PLAN NOTE
- He has irritable bowel syndrome and is on cholestyramine which helps with the symptoms.  He feels he may have a intolerance to gluten.

## 2025-01-31 NOTE — ASSESSMENT & PLAN NOTE
- Plan to undergo sinoplasty in March due to difficulty in nasal breathing.  Currently on armodafinil to 50 mg daily.

## 2025-02-12 ENCOUNTER — OFFICE VISIT (OUTPATIENT)
Dept: MEDICAL GROUP | Facility: IMAGING CENTER | Age: 45
End: 2025-02-12
Payer: COMMERCIAL

## 2025-02-12 VITALS
HEART RATE: 111 BPM | TEMPERATURE: 97.7 F | WEIGHT: 208 LBS | OXYGEN SATURATION: 100 % | DIASTOLIC BLOOD PRESSURE: 80 MMHG | BODY MASS INDEX: 26.69 KG/M2 | RESPIRATION RATE: 18 BRPM | HEIGHT: 74 IN | SYSTOLIC BLOOD PRESSURE: 118 MMHG

## 2025-02-12 DIAGNOSIS — R20.0 NUMBNESS OF LEFT LOWER EXTREMITY: ICD-10-CM

## 2025-02-12 DIAGNOSIS — M47.816 ARTHROPATHY OF LUMBAR FACET JOINT: ICD-10-CM

## 2025-02-12 PROCEDURE — 3074F SYST BP LT 130 MM HG: CPT | Performed by: FAMILY MEDICINE

## 2025-02-12 PROCEDURE — 99214 OFFICE O/P EST MOD 30 MIN: CPT | Performed by: FAMILY MEDICINE

## 2025-02-12 PROCEDURE — 3079F DIAST BP 80-89 MM HG: CPT | Performed by: FAMILY MEDICINE

## 2025-02-12 ASSESSMENT — PATIENT HEALTH QUESTIONNAIRE - PHQ9
1. LITTLE INTEREST OR PLEASURE IN DOING THINGS: NOT AT ALL
SUM OF ALL RESPONSES TO PHQ9 QUESTIONS 1 AND 2: 0
2. FEELING DOWN, DEPRESSED, IRRITABLE, OR HOPELESS: NOT AT ALL

## 2025-02-12 ASSESSMENT — FIBROSIS 4 INDEX: FIB4 SCORE: 0.73
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
## 2025-03-07 ENCOUNTER — HOSPITAL ENCOUNTER (OUTPATIENT)
Dept: LAB | Facility: MEDICAL CENTER | Age: 45
End: 2025-03-07
Attending: STUDENT IN AN ORGANIZED HEALTH CARE EDUCATION/TRAINING PROGRAM
Payer: COMMERCIAL

## 2025-03-07 DIAGNOSIS — Z00.00 ENCOUNTER FOR PREVENTIVE CARE: ICD-10-CM

## 2025-03-07 DIAGNOSIS — R19.5 LOOSE STOOLS: ICD-10-CM

## 2025-03-07 LAB
25(OH)D3 SERPL-MCNC: 31 NG/ML (ref 30–100)
ALBUMIN SERPL BCP-MCNC: 4.9 G/DL (ref 3.2–4.9)
ALBUMIN/GLOB SERPL: 1.5 G/DL
ALP SERPL-CCNC: 73 U/L (ref 30–99)
ALT SERPL-CCNC: 57 U/L (ref 2–50)
ANION GAP SERPL CALC-SCNC: 11 MMOL/L (ref 7–16)
AST SERPL-CCNC: 32 U/L (ref 12–45)
BASOPHILS # BLD AUTO: 0.4 % (ref 0–1.8)
BASOPHILS # BLD: 0.03 K/UL (ref 0–0.12)
BILIRUB SERPL-MCNC: 1.6 MG/DL (ref 0.1–1.5)
BUN SERPL-MCNC: 12 MG/DL (ref 8–22)
CALCIUM ALBUM COR SERPL-MCNC: 9.5 MG/DL (ref 8.5–10.5)
CALCIUM SERPL-MCNC: 10.2 MG/DL (ref 8.5–10.5)
CHLORIDE SERPL-SCNC: 102 MMOL/L (ref 96–112)
CHOLEST SERPL-MCNC: 190 MG/DL (ref 100–199)
CO2 SERPL-SCNC: 25 MMOL/L (ref 20–33)
CREAT SERPL-MCNC: 1.13 MG/DL (ref 0.5–1.4)
EOSINOPHIL # BLD AUTO: 0.08 K/UL (ref 0–0.51)
EOSINOPHIL NFR BLD: 1 % (ref 0–6.9)
ERYTHROCYTE [DISTWIDTH] IN BLOOD BY AUTOMATED COUNT: 39.9 FL (ref 35.9–50)
GFR SERPLBLD CREATININE-BSD FMLA CKD-EPI: 82 ML/MIN/1.73 M 2
GLOBULIN SER CALC-MCNC: 3.2 G/DL (ref 1.9–3.5)
GLUCOSE SERPL-MCNC: 93 MG/DL (ref 65–99)
HCT VFR BLD AUTO: 51.8 % (ref 42–52)
HDLC SERPL-MCNC: 49 MG/DL
HGB BLD-MCNC: 17.2 G/DL (ref 14–18)
IMM GRANULOCYTES # BLD AUTO: 0.03 K/UL (ref 0–0.11)
IMM GRANULOCYTES NFR BLD AUTO: 0.4 % (ref 0–0.9)
LDLC SERPL CALC-MCNC: 108 MG/DL
LYMPHOCYTES # BLD AUTO: 2.52 K/UL (ref 1–4.8)
LYMPHOCYTES NFR BLD: 31.7 % (ref 22–41)
MCH RBC QN AUTO: 30.2 PG (ref 27–33)
MCHC RBC AUTO-ENTMCNC: 33.2 G/DL (ref 32.3–36.5)
MCV RBC AUTO: 91 FL (ref 81.4–97.8)
MONOCYTES # BLD AUTO: 0.75 K/UL (ref 0–0.85)
MONOCYTES NFR BLD AUTO: 9.4 % (ref 0–13.4)
NEUTROPHILS # BLD AUTO: 4.54 K/UL (ref 1.82–7.42)
NEUTROPHILS NFR BLD: 57.1 % (ref 44–72)
NRBC # BLD AUTO: 0 K/UL
NRBC BLD-RTO: 0 /100 WBC (ref 0–0.2)
PLATELET # BLD AUTO: 262 K/UL (ref 164–446)
PMV BLD AUTO: 9.9 FL (ref 9–12.9)
POTASSIUM SERPL-SCNC: 5 MMOL/L (ref 3.6–5.5)
PROT SERPL-MCNC: 8.1 G/DL (ref 6–8.2)
RBC # BLD AUTO: 5.69 M/UL (ref 4.7–6.1)
SODIUM SERPL-SCNC: 138 MMOL/L (ref 135–145)
TRIGL SERPL-MCNC: 164 MG/DL (ref 0–149)
WBC # BLD AUTO: 8 K/UL (ref 4.8–10.8)

## 2025-03-07 PROCEDURE — 85025 COMPLETE CBC W/AUTO DIFF WBC: CPT

## 2025-03-07 PROCEDURE — 80061 LIPID PANEL: CPT | Mod: GA

## 2025-03-07 PROCEDURE — 36415 COLL VENOUS BLD VENIPUNCTURE: CPT | Mod: GA

## 2025-03-07 PROCEDURE — 80053 COMPREHEN METABOLIC PANEL: CPT

## 2025-03-07 PROCEDURE — 86364 TISS TRNSGLTMNASE EA IG CLAS: CPT

## 2025-03-07 PROCEDURE — 82306 VITAMIN D 25 HYDROXY: CPT | Mod: GA

## 2025-03-09 LAB — TTG IGA SER IA-ACNC: 1.35 FLU (ref 0–4.99)

## 2025-03-14 ENCOUNTER — APPOINTMENT (OUTPATIENT)
Dept: MEDICAL GROUP | Facility: PHYSICIAN GROUP | Age: 45
End: 2025-03-14
Payer: COMMERCIAL

## 2025-03-18 DIAGNOSIS — L08.1 ERYTHRASMA: ICD-10-CM

## 2025-03-18 DIAGNOSIS — G47.33 OSA (OBSTRUCTIVE SLEEP APNEA): ICD-10-CM

## 2025-03-18 DIAGNOSIS — G47.19 EXCESSIVE DAYTIME SLEEPINESS: ICD-10-CM

## 2025-03-18 PROCEDURE — RXMED WILLOW AMBULATORY MEDICATION CHARGE: Performed by: INTERNAL MEDICINE

## 2025-03-19 PROCEDURE — RXMED WILLOW AMBULATORY MEDICATION CHARGE: Performed by: STUDENT IN AN ORGANIZED HEALTH CARE EDUCATION/TRAINING PROGRAM

## 2025-03-19 RX ORDER — ARMODAFINIL 250 MG/1
1 TABLET ORAL
Qty: 30 TABLET | Refills: 0 | Status: SHIPPED | OUTPATIENT
Start: 2025-03-19 | End: 2025-04-20

## 2025-03-19 NOTE — TELEPHONE ENCOUNTER
Caller Name: Abel Webster                 Call Back Number: There are no phone numbers on file.        Patient approves a detailed voicemail message: N\A    Have we ever prescribed this med? Yes.  If yes, what date? 1/16/25    Last OV: 1/16/25    Next OV: 4/15/25    DX: JAYESH (obstructive sleep apnea) [G47.33]; Excessive daytime sleepiness [G47.19]     Medications:  Requested Prescriptions     Pending Prescriptions Disp Refills    Armodafinil 250 MG Tab 30 Tablet 0     Sig: Take 1 Tablet by mouth 1 time a day as needed (sleepiness) for up to 30 days. Indications: Obstructive Sleep Apnea Syndrome

## 2025-03-20 RX ORDER — ERYTHROMYCIN 20 MG/G
GEL TOPICAL
Qty: 60 G | Refills: 0 | Status: SHIPPED | OUTPATIENT
Start: 2025-03-20

## 2025-03-21 ENCOUNTER — PHARMACY VISIT (OUTPATIENT)
Dept: PHARMACY | Facility: MEDICAL CENTER | Age: 45
End: 2025-03-21
Payer: COMMERCIAL

## 2025-03-21 RX ORDER — MUPIROCIN 20 MG/G
OINTMENT TOPICAL
Qty: 22 G | Refills: 1 | OUTPATIENT
Start: 2025-03-21

## 2025-04-01 ENCOUNTER — APPOINTMENT (OUTPATIENT)
Dept: MEDICAL GROUP | Age: 45
End: 2025-04-01
Payer: COMMERCIAL

## 2025-04-01 ENCOUNTER — APPOINTMENT (OUTPATIENT)
Dept: MEDICAL GROUP | Facility: IMAGING CENTER | Age: 45
End: 2025-04-01
Payer: COMMERCIAL

## 2025-04-09 ENCOUNTER — APPOINTMENT (OUTPATIENT)
Dept: RADIOLOGY | Facility: MEDICAL CENTER | Age: 45
End: 2025-04-09
Attending: FAMILY MEDICINE
Payer: COMMERCIAL

## 2025-04-15 ENCOUNTER — APPOINTMENT (OUTPATIENT)
Dept: ADMISSIONS | Facility: MEDICAL CENTER | Age: 45
End: 2025-04-15
Attending: OTOLARYNGOLOGY
Payer: COMMERCIAL

## 2025-04-15 ENCOUNTER — APPOINTMENT (OUTPATIENT)
Dept: SLEEP MEDICINE | Facility: MEDICAL CENTER | Age: 45
End: 2025-04-15
Attending: STUDENT IN AN ORGANIZED HEALTH CARE EDUCATION/TRAINING PROGRAM
Payer: COMMERCIAL

## 2025-04-21 ENCOUNTER — PRE-ADMISSION TESTING (OUTPATIENT)
Dept: ADMISSIONS | Facility: MEDICAL CENTER | Age: 45
End: 2025-04-21
Attending: OTOLARYNGOLOGY
Payer: COMMERCIAL

## 2025-04-21 VITALS — BODY MASS INDEX: 26.71 KG/M2 | HEIGHT: 74 IN

## 2025-04-21 RX ORDER — DOXYCYCLINE HYCLATE 100 MG
100 TABLET ORAL 2 TIMES DAILY
Status: ON HOLD | COMMUNITY
End: 2025-04-25

## 2025-04-21 RX ORDER — ONDANSETRON 8 MG/1
8 TABLET, ORALLY DISINTEGRATING ORAL EVERY 8 HOURS PRN
COMMUNITY

## 2025-04-21 NOTE — PREADMIT AVS NOTE
Current Medications   Medication Instructions    ondansetron (ZOFRAN ODT) 8 MG TABLET DISPERSIBLE Continue taking medication as prescribed, including morning of procedure     mupirocin (BACTROBAN) 2 % Ointment Hold medication day of procedure    erythromycin with ethanol (EMGEL) 2 % gel Hold medication day of procedure         pantoprazole (PROTONIX) 40 MG Tablet Delayed Response Continue taking medication as prescribed, including morning of procedure     fluticasone-umeclidinium-vilanterol (TRELEGY ELLIPTA) 200-62.5-25 mcg/act inhaler Continue taking medication as prescribed, including morning of procedure     levothyroxine (SYNTHROID) 25 MCG Tab Continue taking medication as prescribed, including morning of procedure     cholestyramine (QUESTRAN) 4 GM/DOSE powder Hold medication day of procedure    albuterol 108 (90 Base) MCG/ACT Aero Soln inhalation aerosol Continue taking medication as prescribed, including morning of procedure     VITAMIN D PO Stop 7 days before surgery

## 2025-04-25 ENCOUNTER — ANESTHESIA EVENT (OUTPATIENT)
Dept: SURGERY | Facility: MEDICAL CENTER | Age: 45
End: 2025-04-25
Payer: COMMERCIAL

## 2025-04-25 ENCOUNTER — PHARMACY VISIT (OUTPATIENT)
Dept: PHARMACY | Facility: MEDICAL CENTER | Age: 45
End: 2025-04-25
Payer: COMMERCIAL

## 2025-04-25 ENCOUNTER — HOSPITAL ENCOUNTER (OUTPATIENT)
Facility: MEDICAL CENTER | Age: 45
End: 2025-04-25
Attending: OTOLARYNGOLOGY | Admitting: OTOLARYNGOLOGY
Payer: COMMERCIAL

## 2025-04-25 ENCOUNTER — ANESTHESIA (OUTPATIENT)
Dept: SURGERY | Facility: MEDICAL CENTER | Age: 45
End: 2025-04-25
Payer: COMMERCIAL

## 2025-04-25 VITALS
SYSTOLIC BLOOD PRESSURE: 121 MMHG | OXYGEN SATURATION: 94 % | RESPIRATION RATE: 16 BRPM | TEMPERATURE: 98.1 F | WEIGHT: 208.34 LBS | HEART RATE: 85 BPM | DIASTOLIC BLOOD PRESSURE: 87 MMHG | BODY MASS INDEX: 26.75 KG/M2

## 2025-04-25 DIAGNOSIS — Z98.890 STATUS POST FUNCTIONAL ENDOSCOPIC SINUS SURGERY (FESS): Primary | ICD-10-CM

## 2025-04-25 LAB — PATHOLOGY CONSULT NOTE: NORMAL

## 2025-04-25 PROCEDURE — 88305 TISSUE EXAM BY PATHOLOGIST: CPT | Mod: 26 | Performed by: PATHOLOGY

## 2025-04-25 PROCEDURE — A9270 NON-COVERED ITEM OR SERVICE: HCPCS | Performed by: ANESTHESIOLOGY

## 2025-04-25 PROCEDURE — 700102 HCHG RX REV CODE 250 W/ 637 OVERRIDE(OP): Performed by: OTOLARYNGOLOGY

## 2025-04-25 PROCEDURE — 88305 TISSUE EXAM BY PATHOLOGIST: CPT | Performed by: PATHOLOGY

## 2025-04-25 PROCEDURE — 160046 HCHG PACU - 1ST 60 MINS PHASE II: Performed by: OTOLARYNGOLOGY

## 2025-04-25 PROCEDURE — 700111 HCHG RX REV CODE 636 W/ 250 OVERRIDE (IP): Mod: JZ | Performed by: ANESTHESIOLOGY

## 2025-04-25 PROCEDURE — 160025 RECOVERY II MINUTES (STATS): Performed by: OTOLARYNGOLOGY

## 2025-04-25 PROCEDURE — 700105 HCHG RX REV CODE 258: Performed by: OTOLARYNGOLOGY

## 2025-04-25 PROCEDURE — 88311 DECALCIFY TISSUE: CPT | Mod: 26 | Performed by: PATHOLOGY

## 2025-04-25 PROCEDURE — 160047 HCHG PACU  - EA ADDL 30 MINS PHASE II: Performed by: OTOLARYNGOLOGY

## 2025-04-25 PROCEDURE — 160048 HCHG OR STATISTICAL LEVEL 1-5: Performed by: OTOLARYNGOLOGY

## 2025-04-25 PROCEDURE — 700111 HCHG RX REV CODE 636 W/ 250 OVERRIDE (IP): Performed by: ANESTHESIOLOGY

## 2025-04-25 PROCEDURE — 160002 HCHG RECOVERY MINUTES (STAT): Performed by: OTOLARYNGOLOGY

## 2025-04-25 PROCEDURE — 700102 HCHG RX REV CODE 250 W/ 637 OVERRIDE(OP): Performed by: ANESTHESIOLOGY

## 2025-04-25 PROCEDURE — 160041 HCHG SURGERY MINUTES - EA ADDL 1 MIN LEVEL 4: Performed by: OTOLARYNGOLOGY

## 2025-04-25 PROCEDURE — 700101 HCHG RX REV CODE 250: Performed by: OTOLARYNGOLOGY

## 2025-04-25 PROCEDURE — 160009 HCHG ANES TIME/MIN: Performed by: OTOLARYNGOLOGY

## 2025-04-25 PROCEDURE — 160035 HCHG PACU - 1ST 60 MINS PHASE I: Performed by: OTOLARYNGOLOGY

## 2025-04-25 PROCEDURE — 160015 HCHG STAT PREOP MINUTES: Performed by: OTOLARYNGOLOGY

## 2025-04-25 PROCEDURE — 700101 HCHG RX REV CODE 250: Performed by: ANESTHESIOLOGY

## 2025-04-25 PROCEDURE — 160036 HCHG PACU - EA ADDL 30 MINS PHASE I: Performed by: OTOLARYNGOLOGY

## 2025-04-25 PROCEDURE — 88311 DECALCIFY TISSUE: CPT | Performed by: PATHOLOGY

## 2025-04-25 PROCEDURE — RXMED WILLOW AMBULATORY MEDICATION CHARGE: Performed by: OTOLARYNGOLOGY

## 2025-04-25 PROCEDURE — A9270 NON-COVERED ITEM OR SERVICE: HCPCS | Performed by: OTOLARYNGOLOGY

## 2025-04-25 PROCEDURE — 160029 HCHG SURGERY MINUTES - 1ST 30 MINS LEVEL 4: Performed by: OTOLARYNGOLOGY

## 2025-04-25 RX ORDER — DIPHENHYDRAMINE HYDROCHLORIDE 50 MG/ML
12.5 INJECTION, SOLUTION INTRAMUSCULAR; INTRAVENOUS
Status: DISCONTINUED | OUTPATIENT
Start: 2025-04-25 | End: 2025-04-25 | Stop reason: HOSPADM

## 2025-04-25 RX ORDER — BACITRACIN ZINC AND POLYMYXIN B SULFATE 500; 1000 [USP'U]/G; [USP'U]/G
OINTMENT TOPICAL
Status: DISCONTINUED | OUTPATIENT
Start: 2025-04-25 | End: 2025-04-25 | Stop reason: HOSPADM

## 2025-04-25 RX ORDER — SODIUM CHLORIDE, SODIUM LACTATE, POTASSIUM CHLORIDE, CALCIUM CHLORIDE 600; 310; 30; 20 MG/100ML; MG/100ML; MG/100ML; MG/100ML
INJECTION, SOLUTION INTRAVENOUS CONTINUOUS
Status: ACTIVE | OUTPATIENT
Start: 2025-04-25 | End: 2025-04-25

## 2025-04-25 RX ORDER — HYDROMORPHONE HYDROCHLORIDE 1 MG/ML
0.2 INJECTION, SOLUTION INTRAMUSCULAR; INTRAVENOUS; SUBCUTANEOUS
Status: DISCONTINUED | OUTPATIENT
Start: 2025-04-25 | End: 2025-04-25 | Stop reason: HOSPADM

## 2025-04-25 RX ORDER — MEPERIDINE HYDROCHLORIDE 25 MG/ML
6.25 INJECTION INTRAMUSCULAR; INTRAVENOUS; SUBCUTANEOUS
Status: DISCONTINUED | OUTPATIENT
Start: 2025-04-25 | End: 2025-04-25 | Stop reason: HOSPADM

## 2025-04-25 RX ORDER — CEFDINIR 300 MG/1
300 CAPSULE ORAL 2 TIMES DAILY
Qty: 20 CAPSULE | Refills: 0 | Status: SHIPPED | OUTPATIENT
Start: 2025-04-25 | End: 2025-05-02

## 2025-04-25 RX ORDER — CEFAZOLIN SODIUM 1 G/3ML
INJECTION, POWDER, FOR SOLUTION INTRAMUSCULAR; INTRAVENOUS PRN
Status: DISCONTINUED | OUTPATIENT
Start: 2025-04-25 | End: 2025-04-25 | Stop reason: SURG

## 2025-04-25 RX ORDER — HYDRALAZINE HYDROCHLORIDE 20 MG/ML
5 INJECTION INTRAMUSCULAR; INTRAVENOUS
Status: DISCONTINUED | OUTPATIENT
Start: 2025-04-25 | End: 2025-04-25 | Stop reason: HOSPADM

## 2025-04-25 RX ORDER — ONDANSETRON 2 MG/ML
4 INJECTION INTRAMUSCULAR; INTRAVENOUS
Status: COMPLETED | OUTPATIENT
Start: 2025-04-25 | End: 2025-04-25

## 2025-04-25 RX ORDER — OXYCODONE AND ACETAMINOPHEN 10; 325 MG/1; MG/1
TABLET ORAL
Qty: 21 TABLET | Refills: 0 | OUTPATIENT
Start: 2025-04-25

## 2025-04-25 RX ORDER — HALOPERIDOL 5 MG/ML
1 INJECTION INTRAMUSCULAR
Status: DISCONTINUED | OUTPATIENT
Start: 2025-04-25 | End: 2025-04-25 | Stop reason: HOSPADM

## 2025-04-25 RX ORDER — HYDROCODONE BITARTRATE AND ACETAMINOPHEN 10; 325 MG/1; MG/1
TABLET ORAL
Qty: 15 TABLET | Refills: 0 | OUTPATIENT
Start: 2025-04-25

## 2025-04-25 RX ORDER — HYDROMORPHONE HYDROCHLORIDE 1 MG/ML
0.4 INJECTION, SOLUTION INTRAMUSCULAR; INTRAVENOUS; SUBCUTANEOUS
Status: DISCONTINUED | OUTPATIENT
Start: 2025-04-25 | End: 2025-04-25 | Stop reason: HOSPADM

## 2025-04-25 RX ORDER — SCOPOLAMINE 1 MG/3D
PATCH, EXTENDED RELEASE TRANSDERMAL
Status: DISCONTINUED
Start: 2025-04-25 | End: 2025-04-25 | Stop reason: HOSPADM

## 2025-04-25 RX ORDER — EPHEDRINE SULFATE 50 MG/ML
5 INJECTION, SOLUTION INTRAVENOUS
Status: DISCONTINUED | OUTPATIENT
Start: 2025-04-25 | End: 2025-04-25 | Stop reason: HOSPADM

## 2025-04-25 RX ORDER — LIDOCAINE HYDROCHLORIDE AND EPINEPHRINE 10; 10 MG/ML; UG/ML
INJECTION, SOLUTION INFILTRATION; PERINEURAL
Status: DISCONTINUED | OUTPATIENT
Start: 2025-04-25 | End: 2025-04-25 | Stop reason: HOSPADM

## 2025-04-25 RX ORDER — ACETAMINOPHEN 500 MG
1000 TABLET ORAL ONCE
Status: COMPLETED | OUTPATIENT
Start: 2025-04-25 | End: 2025-04-25

## 2025-04-25 RX ORDER — ROCURONIUM BROMIDE 10 MG/ML
INJECTION, SOLUTION INTRAVENOUS PRN
Status: DISCONTINUED | OUTPATIENT
Start: 2025-04-25 | End: 2025-04-25 | Stop reason: SURG

## 2025-04-25 RX ORDER — HYDROMORPHONE HYDROCHLORIDE 1 MG/ML
0.1 INJECTION, SOLUTION INTRAMUSCULAR; INTRAVENOUS; SUBCUTANEOUS
Status: DISCONTINUED | OUTPATIENT
Start: 2025-04-25 | End: 2025-04-25 | Stop reason: HOSPADM

## 2025-04-25 RX ORDER — OXYCODONE AND ACETAMINOPHEN 10; 325 MG/1; MG/1
1 TABLET ORAL EVERY 4 HOURS PRN
Refills: 0 | Status: CANCELLED | OUTPATIENT
Start: 2025-04-25

## 2025-04-25 RX ORDER — OXYMETAZOLINE HYDROCHLORIDE 0.05 G/100ML
SPRAY NASAL
Status: DISCONTINUED | OUTPATIENT
Start: 2025-04-25 | End: 2025-04-25 | Stop reason: HOSPADM

## 2025-04-25 RX ORDER — SODIUM CHLORIDE, SODIUM LACTATE, POTASSIUM CHLORIDE, CALCIUM CHLORIDE 600; 310; 30; 20 MG/100ML; MG/100ML; MG/100ML; MG/100ML
INJECTION, SOLUTION INTRAVENOUS CONTINUOUS
Status: DISCONTINUED | OUTPATIENT
Start: 2025-04-25 | End: 2025-04-25 | Stop reason: HOSPADM

## 2025-04-25 RX ORDER — DEXAMETHASONE SODIUM PHOSPHATE 4 MG/ML
INJECTION, SOLUTION INTRA-ARTICULAR; INTRALESIONAL; INTRAMUSCULAR; INTRAVENOUS; SOFT TISSUE PRN
Status: DISCONTINUED | OUTPATIENT
Start: 2025-04-25 | End: 2025-04-25 | Stop reason: SURG

## 2025-04-25 RX ORDER — LIDOCAINE HYDROCHLORIDE 20 MG/ML
INJECTION, SOLUTION EPIDURAL; INFILTRATION; INTRACAUDAL; PERINEURAL PRN
Status: DISCONTINUED | OUTPATIENT
Start: 2025-04-25 | End: 2025-04-25 | Stop reason: SURG

## 2025-04-25 RX ORDER — ALBUTEROL SULFATE 5 MG/ML
2.5 SOLUTION RESPIRATORY (INHALATION)
Status: DISCONTINUED | OUTPATIENT
Start: 2025-04-25 | End: 2025-04-25 | Stop reason: HOSPADM

## 2025-04-25 RX ORDER — MIDAZOLAM HYDROCHLORIDE 1 MG/ML
INJECTION INTRAMUSCULAR; INTRAVENOUS PRN
Status: DISCONTINUED | OUTPATIENT
Start: 2025-04-25 | End: 2025-04-25 | Stop reason: SURG

## 2025-04-25 RX ORDER — OXYCODONE HCL 5 MG/5 ML
10 SOLUTION, ORAL ORAL
Status: COMPLETED | OUTPATIENT
Start: 2025-04-25 | End: 2025-04-25

## 2025-04-25 RX ORDER — OXYCODONE HCL 5 MG/5 ML
5 SOLUTION, ORAL ORAL
Status: COMPLETED | OUTPATIENT
Start: 2025-04-25 | End: 2025-04-25

## 2025-04-25 RX ORDER — METOPROLOL TARTRATE 1 MG/ML
1 INJECTION, SOLUTION INTRAVENOUS
Status: DISCONTINUED | OUTPATIENT
Start: 2025-04-25 | End: 2025-04-25 | Stop reason: HOSPADM

## 2025-04-25 RX ADMIN — MIDAZOLAM HYDROCHLORIDE 2 MG: 1 INJECTION, SOLUTION INTRAMUSCULAR; INTRAVENOUS at 08:58

## 2025-04-25 RX ADMIN — MEPERIDINE HYDROCHLORIDE 6.25 MG: 25 INJECTION INTRAMUSCULAR; INTRAVENOUS; SUBCUTANEOUS at 09:59

## 2025-04-25 RX ADMIN — FENTANYL CITRATE 25 MCG: 50 INJECTION, SOLUTION INTRAMUSCULAR; INTRAVENOUS at 09:36

## 2025-04-25 RX ADMIN — HALOPERIDOL LACTATE 1 MG: 5 INJECTION, SOLUTION INTRAMUSCULAR at 12:28

## 2025-04-25 RX ADMIN — ONDANSETRON 4 MG: 2 INJECTION INTRAMUSCULAR; INTRAVENOUS at 10:45

## 2025-04-25 RX ADMIN — CEFAZOLIN 2 G: 1 INJECTION, POWDER, FOR SOLUTION INTRAMUSCULAR; INTRAVENOUS at 09:05

## 2025-04-25 RX ADMIN — FENTANYL CITRATE 25 MCG: 50 INJECTION, SOLUTION INTRAMUSCULAR; INTRAVENOUS at 09:01

## 2025-04-25 RX ADMIN — ACETAMINOPHEN 1000 MG: 500 TABLET, FILM COATED ORAL at 08:31

## 2025-04-25 RX ADMIN — ROCURONIUM BROMIDE 50 MG: 10 INJECTION INTRAVENOUS at 09:01

## 2025-04-25 RX ADMIN — LIDOCAINE HYDROCHLORIDE 50 MG: 20 INJECTION, SOLUTION EPIDURAL; INFILTRATION; INTRACAUDAL; PERINEURAL at 09:01

## 2025-04-25 RX ADMIN — SODIUM CHLORIDE, POTASSIUM CHLORIDE, SODIUM LACTATE AND CALCIUM CHLORIDE: 600; 310; 30; 20 INJECTION, SOLUTION INTRAVENOUS at 08:22

## 2025-04-25 RX ADMIN — OXYCODONE HYDROCHLORIDE 10 MG: 5 SOLUTION ORAL at 10:14

## 2025-04-25 RX ADMIN — PROPOFOL 150 MG: 10 INJECTION, EMULSION INTRAVENOUS at 09:01

## 2025-04-25 RX ADMIN — FENTANYL CITRATE 50 MCG: 50 INJECTION, SOLUTION INTRAMUSCULAR; INTRAVENOUS at 09:16

## 2025-04-25 RX ADMIN — FENTANYL CITRATE 50 MCG: 50 INJECTION, SOLUTION INTRAMUSCULAR; INTRAVENOUS at 11:35

## 2025-04-25 RX ADMIN — DEXAMETHASONE SODIUM PHOSPHATE 8 MG: 4 INJECTION INTRA-ARTICULAR; INTRALESIONAL; INTRAMUSCULAR; INTRAVENOUS; SOFT TISSUE at 09:35

## 2025-04-25 RX ADMIN — LIDOCAINE HYDROCHLORIDE 50 MG: 20 INJECTION, SOLUTION EPIDURAL; INFILTRATION; INTRACAUDAL; PERINEURAL at 09:43

## 2025-04-25 RX ADMIN — SUGAMMADEX 170 MG: 100 INJECTION, SOLUTION INTRAVENOUS at 09:43

## 2025-04-25 RX ADMIN — SODIUM CHLORIDE, POTASSIUM CHLORIDE, SODIUM LACTATE AND CALCIUM CHLORIDE: 600; 310; 30; 20 INJECTION, SOLUTION INTRAVENOUS at 08:56

## 2025-04-25 ASSESSMENT — PAIN DESCRIPTION - PAIN TYPE
TYPE: SURGICAL PAIN

## 2025-04-25 ASSESSMENT — FIBROSIS 4 INDEX: FIB4 SCORE: 0.71

## 2025-04-25 NOTE — ANESTHESIA POSTPROCEDURE EVALUATION
Patient: Abel Webster    Procedure Summary       Date: 04/25/25 Room / Location:  OR  / SURGERY [facility]    Anesthesia Start: 0856 Anesthesia Stop: 0953    Procedures:       BILATERAL TURBINOPLASTY, BILATERAL FRONTAL SINUS EXPLORATION, BILATERAL ETHMOIDECTOMIES, BILATERAL MAXILLARY ANTROSTOMY, BILATERAL SPHENOIDOTOMY (Bilateral: Nose)      ENDOSCOPY, PARANASAL SINUS (Bilateral: Nose) Diagnosis: (CHRONIC PANSINUSITIS, HYPERTROPHY OF NASAL TURBINATE)    Surgeons: Esperanza Pierson M.D. Responsible Provider: Adrian Solomon M.D.    Anesthesia Type: general ASA Status: 2            Final Anesthesia Type: general  Last vitals  BP   Blood Pressure: 121/87, NIBP: 127/85    Temp   36.5 °C (97.7 °F)    Pulse   86   Resp   16    SpO2   95 %      Anesthesia Post Evaluation    Patient location during evaluation: PACU  Patient participation: complete - patient participated  Level of consciousness: awake and alert    Airway patency: patent  Anesthetic complications: no  Cardiovascular status: hemodynamically stable  Respiratory status: acceptable  Hydration status: euvolemic    PONV: none          No notable events documented.     Nurse Pain Score: 8 (NPRS)

## 2025-04-25 NOTE — OR NURSING
"0949: To PACU from OR via gurney, sleeping, respirations spontaneous and non-labored. Nasal drip pad c/d/I.     Plan to keep pt in PACU for full hour per [facility]ANG protocol.    1000: medicated for shivering, forced air warmer applied for comfort. Pt remains very drowsy.    1007: shivering resolved. S/b surgeon    1015: Medicated for onset surgical pain, c/o :\"hard\" to breath r/t nasal congestion. No resp distress, encouraged to mouth breath.  Does not tolerate icepack to nose. O2 resumed    1030: Dozes with no resp distress. Nasal drip pad remains c/d/I.    1035: DB+C, nasal drip pad changed for small bloody drainage. Pt states he feels breathing better without O2 mask so further trial of RA commenced.    1045: medicated for onset nausea. Drip pad c/d/i . Forced air rewarmer use d/petrona    1100: No change in surgical site assessment.Meets criteria to transfer to Stage 2.    "

## 2025-04-25 NOTE — ANESTHESIA TIME REPORT
Anesthesia Start and Stop Event Times       Date Time Event    4/25/2025 0843 Ready for Procedure     0856 Anesthesia Start          Responsible Staff  04/25/25      Name Role Begin End    Adrian Solomon M.D. Anesth 0856           Overtime Reason:  no overtime (within assigned shift)    Comments:

## 2025-04-25 NOTE — DISCHARGE INSTRUCTIONS
ACTIVITY: Rest and take it easy for the first 24 hours.  A responsible adult is recommended to remain with you during that time.  It is normal to feel sleepy.  We encourage you to not do anything that requires balance, judgment or coordination.    MILD FLU-LIKE SYMPTOMS ARE NORMAL. YOU MAY EXPERIENCE GENERALIZED MUSCLE ACHES, THROAT IRRITATION, HEADACHE AND/OR SOME NAUSEA.    FOR 24 HOURS DO NOT:  Drive, operate machinery or run household appliances.  Drink beer or alcoholic beverages.   Make important decisions or sign legal documents.    SPECIAL INSTRUCTIONS: Sleep/rest with head elevated at least 45 degrees (ie well propped up with pillows in bed or in recliner chair). Icepacks 20 minutes on/20 minutes off for comfort.     DIET: To avoid nausea, slowly advance diet as tolerated, avoiding spicy or greasy foods for the first day.  Add more substantial food to your diet according to your physician's instructions.  INCREASE FLUIDS AND FIBER TO AVOID CONSTIPATION.    SURGICAL DRESSING/BATHING: Change drip pad as needed.      FOLLOW-UP APPOINTMENT:  A follow-up appointment should be arranged with your doctor; call to schedule.    You should CALL YOUR PHYSICIAN if you develop:  Fever greater than 101 degrees F.  Pain not relieved by medication, or persistent nausea or vomiting.  Excessive bleeding (blood soaking through dressing) or unexpected drainage from the wound.  Extreme redness or swelling around the incision site, drainage of pus or foul smelling drainage.  Inability to urinate or empty your bladder within 8 hours.  Problems with breathing or chest pain.    You should call 911 if you develop problems with breathing or chest pain.  If you are unable to contact your doctor or surgical center, you should go to the nearest emergency room or urgent care center.  Physician's telephone #: [phone]    If any questions arise, call your doctor.  If your doctor is not available, please feel free to call the Surgical  Center at [phone].     A registered nurse may call you a few days after your surgery to see how you are doing after your procedure.    MEDICATIONS: Resume taking daily medication.  Take prescribed pain medication with food.  If no medication is prescribed, you may take non-aspirin pain medication if needed.  PAIN MEDICATION CAN BE VERY CONSTIPATING.  Take a stool softener or laxative such as senokot, pericolace, or milk of magnesia if needed.    Last pain medication given at 10:14 a.m. (10 mg oxycodone).    If your physician has prescribed pain medication that includes Acetaminophen (Tylenol), do not take additional Acetaminophen (Tylenol) while taking the prescribed medication.    Discharge Education for patients on JAYESH (Obstructive Sleep Apnea) Protocol    Prior to receiving sedation or anesthesia, we screen all patients for Obstructive Sleep Apnea.  During your screening, you were identified as having Obstructive Sleep Apnea(JAYESH).    What is Obstructive Sleep Apnea?  Sleep apnea (AP-ne-ah) is a common disorder which involves breathing pauses that occur during sleep.  These can last from 10 seconds to a minute or longer.  Normal breathing resumes often with a loud snort or choking sound.    Sleep apnea occurs in all age groups and both genders but is more common in men and people over 40 years of age.  It has been estimated that as many as 18 million Americans have sleep apnea.  Most people who have sleep apnea don’t know they have it because it only occurs during sleep.  A family member and/or bed partner may first notice the signs of sleep apnea.  Sleep apnea is a chronic (ongoing) condition that disrupts the quality and quantity of your sleep repeatedly throughout the night.  This often results in excessive daytime sleepiness or fatigue during the day.  It may also contribute to high blood pressure, heart problems, and complications following medications used for surgery and procedures.    We recommend  that you should be with an adult observer for at least 24 hours after your sedation/anesthesia.  If you have a CPAP machine, you should wear it during any sleep period (day or night) for the week following your procedure.  We encourage you to sleep on your side or in a sitting position, even with napping.  Lying flat on your back increases the risk of apnea and airway obstruction during your post procedure recovery period.    It is important to prevent over-sedation that could increase your risk for apnea.  Please take all pain medication as directed by your physician.  If you are not getting pain relief, please contact your physician to discuss possible approaches to relieving pain while minimizing medications that can affect your breathing and oxygen levels.

## 2025-04-25 NOTE — OP REPORT
DATE OF SERVICE:  04/25/2025     PREOPERATIVE DIAGNOSES:  Chronic sinusitis and pansinusitis.     POSTOPERATIVE DIAGNOSES:  Chronic sinusitis, pansinusitis, enlarged inferior   turbinate hypertrophy bilaterally.     PROCEDURES:  Bilateral frontal sinusotomy, bilateral total ethmoidectomy,   bilateral maxillary sinusotomy, bilateral sphenoid sinusotomy, and bilateral   inferior turbinoplasty with gold laser.     SURGEON:  Esperanza Pierson MD     ANESTHESIOLOGIST:  Adrian Solomon MD     ANESTHESIA:  General endotracheal.     NARRATIVE:  I met the patient in preoperative holding area, we reviewed risks,   benefits, complications and alternatives, as well as medical history, the   patient was then taken to the operating room and placed under satisfactory   general anesthesia.  Once that was then completed, CT scan was hung at the   head of bed.  I prepped the patient's face and injected with 1% Xylocaine with   epinephrine, 1:100,000.  I left two Afrin pledgets inside the nasal cavity.    I left the scrub upon returning.  I then draped the patient in a sterile   manner.     Commencing with a 0-degree endoscope inside the nasal cavity, I examined the   inside of the nose.  The patient had significant inferior turbinate   hypertrophy.  Both inferior turbinates were injected with local as was the   uncinate middle turbinate, frontal recess area, and face of the sphenoid.    Once that was then completed, I then came back, did an outfracture with the   Boies elevator on the right and left inferior turbinates, made sure the FiO2   was set below 30%, and then reduced the submucosal tissue with a gold laser   set at 12.5 rollins.  Once that was accomplished, I went back to the [facility],   performed an inferior uncinatectomy with a straight biting Blakesley and a   backbiter evacuated the contents and then switched over to a [employer]tronic shaver.    A 4 mm shaver was used to enter into the anterior ethmoidal bulla back   through  the basal lamella into the posterior ethmoidal chamber.  Once that was   then completed, I then used a 6 mm balloon to dissect all the way up into the   frontal sinus confirming the placement of the LAD.  Once that was then   completed, a biting Blakesley was then used to remove a lot of the bone   spicules and mucosal area from that region.  I then used the straight sphenoid   mushroom punch on the right sphenoid face and opened it up with the width of   about 1.5 mushroom punches.  Identical procedure was carried out on the   left-hand side.  Once that was completely done, I then suctioned out the nasal   cavity and placed in two Alfonso sinus packs under the fovea ethmoidalis   cavity.  The patient had that lightly tied across the columella.  He was   suctioned out, returned to anesthesia, awakened, extubated, and transferred to   recovery.     ESTIMATED BLOOD LOSS:  Total about 10 mL.     DRAINS USED:  None.        ______________________________  MD DOUGLAS HERNANDEZ/ERWIN    DD:  04/25/2025 10:02  DT:  04/25/2025 11:01    Job#:  [id number]

## 2025-04-25 NOTE — ANESTHESIA PREPROCEDURE EVALUATION
Case: [id number] Date/Time: 04/25/25 0900    Procedures:       BILATERAL TURBINOPLASTY, BILATERAL FRONTAL SINUS EXPLORATION, BILATERAL ETHMOIDECTOMIES, BILATERAL MAXILLARY ANTROSTOMY, BILATERAL SPHENOIDOTOMY (Bilateral: Nose)      ENDOSCOPY, PARANASAL SINUS (Bilateral: Nose)    Anesthesia type: General    Pre-op diagnosis: CHRONIC PANSINUSITIS, HYPERTROPHY OF NASAL TURBINATE    Location: [facility] OR 02 / SURGERY [facility] [place]    Surgeons: Esperanza Pierson M.D.          Took meds last night  RUTH controlled on meds  Relevant Problems   ANESTHESIA   (positive) Obstructive sleep apnea      PULMONARY   (positive) Asthma   (positive) Moderate persistent asthma without complication      NEURO   (positive) Migraines      CARDIAC   (positive) Hemorrhoids   (positive) Migraines      GI   (positive) Gastroesophageal reflux disease         (positive) Fatty liver      ENDO   (positive) Hypothyroid       Physical Exam    Airway   Mallampati: II  TM distance: >3 FB  Neck ROM: full       Cardiovascular - normal exam  Rhythm: regular  Rate: normal  (-) murmur     Dental - normal exam        Facial Hair   Pulmonary - normal exam  Breath sounds clear to auscultation     Abdominal    Neurological - normal exam                   Anesthesia Plan    ASA 2       Plan - general       Airway plan will be ETT          Induction: intravenous    Postoperative Plan: Postoperative administration of opioids is intended.    Pertinent diagnostic labs and testing reviewed    Informed Consent:    Anesthetic plan and risks discussed with patient.    Use of blood products discussed with: patient whom consented to blood products.

## 2025-04-25 NOTE — ANESTHESIA PROCEDURE NOTES
Airway    Date/Time: 4/25/2025 9:03 AM    Performed by: Adrian Solomon M.D.  Authorized by: Adrian Solomon M.D.    Location:  OR  Urgency:  Elective  Indications for Airway Management:  Anesthesia      Spontaneous Ventilation: absent    Sedation Level:  Deep  Preoxygenated: Yes    Patient Position:  Sniffing  Final Airway Type:  Endotracheal airway  Final Endotracheal Airway:  ETT  Cuffed: Yes    Technique Used for Successful ETT Placement:  Direct laryngoscopy    Insertion Site:  Oral  Blade Type:  Tor  Laryngoscope Blade/Videolaryngoscope Blade Size:  3  ETT Size (mm):  8.0  Measured from:  Teeth  ETT to Teeth (cm):  23  Placement Verified by: auscultation and capnometry    Cormack-Lehane Classification:  Grade I - full view of glottis  Number of Attempts at Approach:  1   LTA lidso then ett

## 2025-04-25 NOTE — OR NURSING
1104: Patient to Phase II from PACU. Patient assisted with dressing with help from CNA.     1135: Pain 8/10, given fentanyl 50mcg    1200: increased bleeding, states that he feels like he needs to sneeze, dressing changed, Dr. Pierson notified, bleeding improved, pain improved.    1228: c/o nasuea, given haldol 1mg IV    1300: nausea improved, patients states that he requested Oxycodone from Dr. Pierson as Hydrocodone does not work for pain, awaiting call back from Dr. Pierson    1330: Dr. Pierson will update medications, notified Meds to Beds and awaiting new prescription to arrive.     1424: Discharge education completed and family denies further questions.     1430: Discharged to care of family post uneventful stay in phase II recovery.

## 2025-04-25 NOTE — ANESTHESIA TIME REPORT
Anesthesia Start and Stop Event Times       Date Time Event    4/25/2025 0843 Ready for Procedure     0856 Anesthesia Start     0953 Anesthesia Stop          Responsible Staff  04/25/25      Name Role Begin End    Adrian Solomon M.D. Anesth 0856 0953          Overtime Reason:  no overtime (within assigned shift)    Comments:

## 2025-05-02 ENCOUNTER — APPOINTMENT (OUTPATIENT)
Dept: MEDICAL GROUP | Facility: PHYSICIAN GROUP | Age: 45
End: 2025-05-02
Payer: MEDICARE

## 2025-05-06 ENCOUNTER — APPOINTMENT (OUTPATIENT)
Dept: MEDICAL GROUP | Facility: PHYSICIAN GROUP | Age: 45
End: 2025-05-06
Payer: MEDICARE

## 2025-06-18 DIAGNOSIS — G47.33 OSA (OBSTRUCTIVE SLEEP APNEA): ICD-10-CM

## 2025-06-18 DIAGNOSIS — G47.19 EXCESSIVE DAYTIME SLEEPINESS: ICD-10-CM

## 2025-06-18 DIAGNOSIS — G89.18 POSTOPERATIVE PAIN: ICD-10-CM

## 2025-06-18 PROCEDURE — RXMED WILLOW AMBULATORY MEDICATION CHARGE: Performed by: NURSE PRACTITIONER

## 2025-06-18 PROCEDURE — RXMED WILLOW AMBULATORY MEDICATION CHARGE: Performed by: OTOLARYNGOLOGY

## 2025-06-18 PROCEDURE — RXMED WILLOW AMBULATORY MEDICATION CHARGE: Performed by: INTERNAL MEDICINE

## 2025-06-18 RX ORDER — ARMODAFINIL 250 MG/1
1 TABLET ORAL
Qty: 30 TABLET | Refills: 0 | Status: CANCELLED | OUTPATIENT
Start: 2025-06-18 | End: 2025-07-18

## 2025-06-19 PROCEDURE — RXMED WILLOW AMBULATORY MEDICATION CHARGE: Performed by: NURSE PRACTITIONER

## 2025-06-19 RX ORDER — ONDANSETRON 4 MG/1
4 TABLET, ORALLY DISINTEGRATING ORAL EVERY 6 HOURS PRN
Qty: 10 TABLET | Refills: 0 | Status: SHIPPED | OUTPATIENT
Start: 2025-06-19

## 2025-06-24 ENCOUNTER — PHARMACY VISIT (OUTPATIENT)
Dept: PHARMACY | Facility: MEDICAL CENTER | Age: 45
End: 2025-06-24
Payer: COMMERCIAL

## 2025-06-24 PROCEDURE — RXMED WILLOW AMBULATORY MEDICATION CHARGE: Performed by: STUDENT IN AN ORGANIZED HEALTH CARE EDUCATION/TRAINING PROGRAM

## 2025-06-25 ENCOUNTER — PHARMACY VISIT (OUTPATIENT)
Dept: PHARMACY | Facility: MEDICAL CENTER | Age: 45
End: 2025-06-25
Payer: COMMERCIAL

## 2025-07-07 ENCOUNTER — OFFICE VISIT (OUTPATIENT)
Dept: SLEEP MEDICINE | Facility: MEDICAL CENTER | Age: 45
End: 2025-07-07
Attending: STUDENT IN AN ORGANIZED HEALTH CARE EDUCATION/TRAINING PROGRAM
Payer: COMMERCIAL

## 2025-07-07 VITALS
RESPIRATION RATE: 14 BRPM | WEIGHT: 208.6 LBS | OXYGEN SATURATION: 97 % | BODY MASS INDEX: 26.77 KG/M2 | HEART RATE: 98 BPM | SYSTOLIC BLOOD PRESSURE: 104 MMHG | HEIGHT: 74 IN | DIASTOLIC BLOOD PRESSURE: 54 MMHG

## 2025-07-07 DIAGNOSIS — G47.19 EXCESSIVE DAYTIME SLEEPINESS: Primary | ICD-10-CM

## 2025-07-07 DIAGNOSIS — G47.33 OSA (OBSTRUCTIVE SLEEP APNEA): ICD-10-CM

## 2025-07-07 PROCEDURE — 3074F SYST BP LT 130 MM HG: CPT | Performed by: STUDENT IN AN ORGANIZED HEALTH CARE EDUCATION/TRAINING PROGRAM

## 2025-07-07 PROCEDURE — 99213 OFFICE O/P EST LOW 20 MIN: CPT | Performed by: STUDENT IN AN ORGANIZED HEALTH CARE EDUCATION/TRAINING PROGRAM

## 2025-07-07 PROCEDURE — 3078F DIAST BP <80 MM HG: CPT | Performed by: STUDENT IN AN ORGANIZED HEALTH CARE EDUCATION/TRAINING PROGRAM

## 2025-07-07 PROCEDURE — 99212 OFFICE O/P EST SF 10 MIN: CPT | Performed by: STUDENT IN AN ORGANIZED HEALTH CARE EDUCATION/TRAINING PROGRAM

## 2025-07-07 ASSESSMENT — FIBROSIS 4 INDEX: FIB4 SCORE: 0.71

## 2025-07-07 NOTE — PROGRESS NOTES
[facility] Follow-up Visit    CC: Follow-up to discuss management of daytime tiredness, unrefreshing sleep and sleep apnea management.      HPI:  Abel Webster is a 44 y.o.male  with GERD, chronic sinusitis, asthma, daytime tiredness, obstructive sleep apnea status post hypoglossal nerve stimulator.  Presents today to follow-up regarding management of obstructive sleep apnea, daytime tiredness and unrefreshing sleep.    Since his last visit he has undergone nasal surgery with ENT.  He has recently gone bilateral sinus surgery and turbinoplasty.  He feels that he still recovering from surgery that happened at the end of April.  He believes his breathing is not had a major difference since surgery.    Continues to benefit from modafinil.  Taking 250 mg of modafinil split up and spaced out by 1 to 2 hours.  He finds with his medication he is able to function during the day whereas without it he cannot function at all.  Denies any difficulty with tachycardia, worsened anxiety or headaches.    He continues have difficulty using his hypoglossal nerve stimulator.  He feels that when he lays on his right side he hears buzzing in his ear from the device that keeps him awake or wakes him up from sleep.  He feels that if he can fix the buzzing he would be able to use the device more effectively.      Current settings   1)Start Delay: 75 min  2)Pause Time: 30 min  3)Total run time: 11 hours  4) Minimum voltage 1.0 v   5) Maximum voltage 1.3v  6) Pulse configuration (- + -)  7) Pulse Width 90 microseconds  8) Rate 33 Hz  9) Current voltage 1.1v      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    Hypertrophy, nasal, turbinate 07/03/2019    Fatty liver 11/07/2018    Migraines 11/07/2018    Asthma 11/07/2018    Jaw pain 07/18/2017    Hemorrhoids 03/15/2017    Gastroesophageal reflux disease 03/14/2017    Major depressive disorder, recurrent episode (HCC) 08/31/2016    Arthropathy of lumbar facet joint 07/12/2016    Chronic low back pain 02/14/2016    Obstructive sleep apnea 09/09/2015    Plantar fasciitis, bilateral 08/19/2015    Anal pain 05/12/2015    Irritable bowel syndrome 11/17/2011    Allergic rhinitis 05/24/1996       Past Medical History[1]     Past Surgical History[2]    Family History   Problem Relation Age of Onset    Colorectal Cancer Paternal Uncle         after age 50       Social History     Socioeconomic History    Marital status:      Spouse name: Not on file    Number of children: Not on file    Years of education: Not on file    Highest education level: Not on file   Occupational History    Not on file   Tobacco Use    Smoking status: Never    Smokeless tobacco: Never   Vaping Use    Vaping status: Never Used   Substance and Sexual Activity    Alcohol use: Yes     Comment: very rarely    Drug use: Never    Sexual activity: Yes     Partners: Female     Birth control/protection: None   Other Topics Concern    Not on file   Social History Narrative    Not on file     Social Drivers of Health     Financial Resource Strain: Patient Declined (4/1/2025)    Overall Financial Resource Strain (CARDIA)     Difficulty of Paying Living Expenses: Patient declined   Food Insecurity: Patient Declined (4/1/2025)    Hunger Vital Sign     Worried About Running Out of Food in the Last Year: Patient declined     Ran Out of Food in the Last Year: Patient declined   Transportation Needs: Patient Declined (4/1/2025)    PRAPARE - Transportation     Lack of Transportation (Medical): Patient declined     Lack of Transportation (Non-Medical): Patient declined   Physical Activity:  "Patient Declined (4/1/2025)    Exercise Vital Sign     Days of Exercise per Week: Patient declined     Minutes of Exercise per Session: Patient declined   Stress: Patient Declined (4/1/2025)    Citizen of [place] [employer] - Occupational Stress Questionnaire     Feeling of Stress : Patient declined   Social Connections: Patient Declined (4/1/2025)    Social Connection and Isolation Panel [NHANES]     Frequency of Communication with Friends and Family: Patient declined     Frequency of Social Gatherings with Friends and Family: Patient declined     Attends Christianity Services: Patient declined     Active Member of Clubs or Organizations: Patient declined     Attends Club or Organization Meetings: Patient declined     Marital Status: Patient declined   Intimate Partner Violence: Not on file   Housing Stability: Patient Declined (4/1/2025)    Housing Stability Vital Sign     Unable to Pay for Housing in the Last Year: Patient declined     Number of Times Moved in the Last Year: Not on file     Homeless in the Last Year: Patient declined       Current Medications[3]     ALLERGIES: Seasonal    ROS  Constitutional: Denies fevers, Denies weight changes  Ears/Nose/Throat/Mouth: Denies nasal congestion or sore throat   Cardiovascular: Denies chest pain  Respiratory: Denies shortness of breath, Denies cough  Gastrointestinal/Hepatic: Denies nausea, vomiting  Sleep: see HPI      PHYSICAL EXAM  /54 (BP Location: Left arm, Patient Position: Sitting, BP Cuff Size: Large adult)   Pulse 98   Resp 14   Ht 1.88 m (6' 2\")   Wt 94.6 kg (208 lb 9.6 oz)   SpO2 97%   BMI 26.78 kg/m²   Appearance: Well-nourished, well-developed, no acute distress  Eyes:  No scleral icterus , EOMI  Musculoskeletal:  Grossly normal; gait and station normal; digits and nails normal  Skin:  No rashes, petechiae, cyanosis  Neurologic: without focal signs; oriented to person, time, place, and purpose; judgement intact      Medical Decision " Making   Assessment and Plan  Abel Webster is a 44 y.o.male  with GERD, chronic sinusitis, asthma, daytime tiredness, obstructive sleep apnea status post hypoglossal nerve stimulator.  Presents today to follow-up regarding management of obstructive sleep apnea, daytime tiredness and unrefreshing sleep.    The medical record was reviewed.    Obstructive sleep apnea  Patient continues to use and benefit from armodafinil 250 mg daily as needed for sleepiness.  He does continue to have difficulty with using his hypoglossal nerve stimulator.  He does have a buzzing in his right ear when laying on his right side at night.  He has tried sleeping more on his left side on his back but he states he cannot control position he sleeps in at night.  He is curious to know if there is a potential barrier he may build up but on his neck which may help relieve pressure which would decrease buzzing.    Patient has been on different pulse width in the past at different electrode configurations.  May consider adjusting pulse with bradycardia at next visit.  Will discuss with inspire team before changing settings to see which may have greatest impact on buzzing.    PLAN:   - Continue armodafinil 250 mg daily as needed for sleepiness  -Will plan to see him back in 1 to 2 weeks for an inspire visit to adjust inspire device  -Advised to reach out via [employer] with questions     Patients with JAYESH are at increased risk of cardiovascular disease including coronary artery disease, systemic arterial hypertension, pulmonary arterial hypertension, cardiac arrythmias, and stroke. Patients are advised to avoid driving a motor vehicle when drowsy.    Return in about 10 days (around 7/17/2025) for inspire.      Please note portions of this record was created using voice recognition software. I have made every reasonable attempt to correct obvious errors, but I expect that there are errors of grammar and possibly content I did not discover before  finalizing the note.           [1]   Past Medical History:  Diagnosis Date    Arthropathy     Asthma 02/13/2023    inhalers daily and prn    Chickenpox     Chronic fatigue     Chronic pain 02/13/2023    back, buttocks    Dental disorder     lower tooth    Disorder of thyroid 02/13/2023    medicated    Fatty liver     GERD (gastroesophageal reflux disease)     Heart burn 02/13/2023    medicated    Hypoplasia of mandible     IBS (irritable bowel syndrome) 02/13/2023    medicated    Maxillary hypoplasia     Sleep apnea 02/13/2023    positive JAYESH, but is unable to use CPAP   [2]   Past Surgical History:  Procedure Laterality Date    TURBINOPLASTY Bilateral 4/25/2025    Procedure: BILATERAL TURBINOPLASTY, BILATERAL FRONTAL SINUS EXPLORATION, BILATERAL ETHMOIDECTOMIES, BILATERAL MAXILLARY ANTROSTOMY, BILATERAL SPHENOIDOTOMY;  Surgeon: Esperanza Pierson M.D.;  Location: SURGERY [facility];  Service: Ent    ENDOSCOPY, PARANASAL SINUS, WITH TOTAL ETHMOIDECTOMY, SN Bilateral 4/25/2025    Procedure: ENDOSCOPY, PARANASAL SINUS;  Surgeon: Esperanza Pierson M.D.;  Location: SURGERY [facility];  Service: Ent    OH OPEN IMPLTJ HPGLSL NRV NSTIM RA PG&RESPIR SENSOR Right 3/6/2023    Procedure: INSERTION OF HYPOGLOSSAL NERVE NEUROSTIMULATOR ELECTRODE AND GENERATOR AND BREATHING SENSOR ELECTRODE RIGHT;  Surgeon: Pedro Wen M.D.;  Location: SURGERY SAME DAY [facility];  Service: Ent    OTHER  02/13/2023 11/2021 septoplasty    OTHER  02/13/2023 2021 Hard palate expander placed    OTHER ABDOMINAL SURGERY     [3]   Current Outpatient Medications   Medication Sig Dispense Refill    Armodafinil 250 MG Tab Take 1 Tablet by mouth 1 time a day as needed (sleepiness) for up to 30 days. Indications: Obstructive Sleep Apnea Syndrome 30 Tablet 2    ondansetron (ZOFRAN ODT) 4 MG TABLET DISPERSIBLE Take 1 Tablet by mouth every 6 hours as needed for Nausea/Vomiting. 10 Tablet 0    ondansetron (ZOFRAN ODT) 8 MG TABLET DISPERSIBLE Take  8 mg by mouth every 8 hours as needed for Nausea.      mupirocin (BACTROBAN) 2 % Ointment 1 application to affected area Externally Three times a day 14 days 22 g 1    erythromycin with ethanol (EMGEL) 2 % gel Apply to armpit area twice daily  x 3 weeks 60 g 0    pantoprazole (PROTONIX) 40 MG Tablet Delayed Response Take 1 Tablet by mouth every day. 90 Tablet 3    fluticasone-umeclidinium-vilanterol (TRELEGY ELLIPTA) 200-62.5-25 mcg/PUFF inhaler Inhale 1 Puff by mouth every day. 180 Each 3    levothyroxine (SYNTHROID) 25 MCG Tab Take 1 Tablet by mouth every morning on an empty stomach. 90 Tablet 3    cholestyramine (QUESTRAN) 4 GM/DOSE powder MIX 1 SCOOP FULL in liquid and drink TWICE A DAY (PT PREFERS CANS NDC 0432-9994-32) 2268 g 2    albuterol 108 (90 Base) MCG/ACT Aero Soln inhalation aerosol Inhale 2 Puffs every four hours as needed for Shortness of Breath. 8.5 g 3    VITAMIN D PO Take  by mouth every day.      HYDROcodone/acetaminophen (NORCO)  MG Tab Take 1 tablet by mouth every 6 hours as needed for 5 days (Patient not taking: Reported on 7/7/2025) 15 Tablet 0    oxyCODONE-acetaminophen (PERCOCET)  MG Tab Take 1 tablet by mouth every 6 hours as needed for 7 days (Patient not taking: Reported on 7/7/2025) 21 Tablet 0    Non Formulary Request B complex (Patient not taking: Reported on 7/7/2025)       No current facility-administered medications for this visit.

## 2025-07-17 ENCOUNTER — OFFICE VISIT (OUTPATIENT)
Dept: SLEEP MEDICINE | Facility: MEDICAL CENTER | Age: 45
End: 2025-07-17
Attending: STUDENT IN AN ORGANIZED HEALTH CARE EDUCATION/TRAINING PROGRAM
Payer: MEDICARE

## 2025-07-17 ENCOUNTER — APPOINTMENT (OUTPATIENT)
Dept: RADIOLOGY | Facility: IMAGING CENTER | Age: 45
End: 2025-07-17
Attending: NURSE PRACTITIONER
Payer: COMMERCIAL

## 2025-07-17 ENCOUNTER — OFFICE VISIT (OUTPATIENT)
Dept: URGENT CARE | Facility: CLINIC | Age: 45
End: 2025-07-17
Payer: COMMERCIAL

## 2025-07-17 VITALS
OXYGEN SATURATION: 97 % | WEIGHT: 209 LBS | RESPIRATION RATE: 16 BRPM | SYSTOLIC BLOOD PRESSURE: 118 MMHG | TEMPERATURE: 98.9 F | HEART RATE: 103 BPM | HEIGHT: 74 IN | DIASTOLIC BLOOD PRESSURE: 78 MMHG | BODY MASS INDEX: 26.82 KG/M2

## 2025-07-17 VITALS
RESPIRATION RATE: 16 BRPM | HEIGHT: 74 IN | DIASTOLIC BLOOD PRESSURE: 72 MMHG | SYSTOLIC BLOOD PRESSURE: 110 MMHG | OXYGEN SATURATION: 95 % | HEART RATE: 118 BPM | BODY MASS INDEX: 26.72 KG/M2 | WEIGHT: 208.2 LBS

## 2025-07-17 DIAGNOSIS — F41.9 ANXIETY: Primary | ICD-10-CM
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
## 2025-07-17 DIAGNOSIS — S80.02XA CONTUSION OF LEFT KNEE, INITIAL ENCOUNTER: ICD-10-CM

## 2025-07-17 DIAGNOSIS — S93.402A SPRAIN OF LEFT ANKLE, UNSPECIFIED LIGAMENT, INITIAL ENCOUNTER: ICD-10-CM

## 2025-07-17 DIAGNOSIS — S80.01XA CONTUSION OF RIGHT KNEE, INITIAL ENCOUNTER: ICD-10-CM

## 2025-07-17 DIAGNOSIS — Z45.42 ENCOUNTER FOR ADJUSTMENT AND MANAGEMENT OF NEUROSTIMULATOR: ICD-10-CM

## 2025-07-17 DIAGNOSIS — S89.91XA INJURY OF RIGHT KNEE, INITIAL ENCOUNTER: Primary | ICD-10-CM

## 2025-07-17 DIAGNOSIS — S89.91XA INJURY OF RIGHT KNEE, INITIAL ENCOUNTER: ICD-10-CM

## 2025-07-17 DIAGNOSIS — Z96.82 S/P INSERTION OF HYPOGLOSSAL NERVE STIMULATOR: ICD-10-CM

## 2025-07-17 DIAGNOSIS — G47.33 OSA (OBSTRUCTIVE SLEEP APNEA): ICD-10-CM

## 2025-07-17 PROCEDURE — 3078F DIAST BP <80 MM HG: CPT | Performed by: STUDENT IN AN ORGANIZED HEALTH CARE EDUCATION/TRAINING PROGRAM

## 2025-07-17 PROCEDURE — 3074F SYST BP LT 130 MM HG: CPT | Performed by: NURSE PRACTITIONER

## 2025-07-17 PROCEDURE — 3078F DIAST BP <80 MM HG: CPT | Performed by: NURSE PRACTITIONER

## 2025-07-17 PROCEDURE — 99214 OFFICE O/P EST MOD 30 MIN: CPT | Performed by: STUDENT IN AN ORGANIZED HEALTH CARE EDUCATION/TRAINING PROGRAM

## 2025-07-17 PROCEDURE — 99214 OFFICE O/P EST MOD 30 MIN: CPT | Performed by: NURSE PRACTITIONER

## 2025-07-17 PROCEDURE — 73562 X-RAY EXAM OF KNEE 3: CPT | Mod: TC,FY,RT | Performed by: STUDENT IN AN ORGANIZED HEALTH CARE EDUCATION/TRAINING PROGRAM

## 2025-07-17 PROCEDURE — 95976 ALYS SMPL CN NPGT PRGRMG: CPT | Performed by: STUDENT IN AN ORGANIZED HEALTH CARE EDUCATION/TRAINING PROGRAM

## 2025-07-17 PROCEDURE — 3074F SYST BP LT 130 MM HG: CPT | Performed by: STUDENT IN AN ORGANIZED HEALTH CARE EDUCATION/TRAINING PROGRAM

## 2025-07-17 ASSESSMENT — FIBROSIS 4 INDEX
FIB4 SCORE: 0.71
FIB4 SCORE: 0.71

## 2025-07-17 NOTE — PROGRESS NOTES
[facility] Follow-up Visit    CC: Management of obstructive sleep apnea      HPI:  Abel Webster is a 44 y.o.male  with GERD, asthma, daytime sleepiness, and obstructive sleep apnea status post hypoglossal nerve stimulator.  Presents today to follow-up regarding management of obstructive sleep apnea.    He has not been using his inspire device recently.  Main complaint regarding using it is buzzing in the night when it is on.  This is present when he lays on his right side and there is pressure over his implantation site on his neck.  He states overall stimulation is tolerable.  Current delays are appropriate.    He does report one of his difficulties during the day and which impacts his sleep is his anxiety.  He does have significant anxiety.  He feels that the stress in his life impacts his mood.  He has not seen psychology regarding his anxiety.  He has previously seen a psychiatrist regarding his daytime fatigue and tiredness.  He is open to seeing psychology    Programming and interrogation of hypoglossal nerve stimulator  Stimulation was delivered at incoming voltage setting which elicited tongue protrusion.  Patient found stimulation level tolerable.  Turned device on and allowed to run for 1 to 2 minutes.  During this time patient was manually applying pressure over surgical incision site at neck.  No buzzing was elicited.  Voltage levels were increased to see if increased voltage level would elicit his buzzing which it did not.  Changed pulse within rate to 120/30.  Ran similar test with turning device on and applying pressure to see if symptoms return.  No symptoms of buzzing occurred at today's visit.    Incoming settings  1)Start Delay: 75 min  2)Pause Time: 30 min  3)Total run time: 11 hours  4) Minimum voltage 1.0 v   5) Maximum voltage 1.3v  6) Pulse configuration (E: - + -)  7) Pulse Width 90 microseconds  8) Rate 33 Hz  9) Current voltage 1.0v    Outgoing settings changes in  bold  1)Start Delay: 75 min  2)Pause Time: 30 min  3)Total run time: 11 hours  4) Minimum voltage 0.8 v   5) Maximum voltage 1.3v  6) Pulse configuration (E:- + -)  7) Pulse Width 120 microseconds  8) Rate 30 Hz  9) Current voltage 1.0v         Sleep History  8/31/2022 PSG showed moderate obstructive sleep apnea with an overall AHI of 17 events an hour, minimum oxygen saturation 81%, time at or below 88% saturation 48 minutes  4/15/2024 PSG inspire fine-tune study showed an overall AHI of 15.7 events an hour.  Time at or below 88% saturation of 54 minutes.  Towards end of study at 1.2 V 105 minutes of sleep was seen with an overall AHI of 1.7 V.  This was in the lateral position.  Oxygen saturations normalized during this period.    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    Hypertrophy, nasal, turbinate 07/03/2019    Fatty liver 11/07/2018    Migraines 11/07/2018    Asthma 11/07/2018    Jaw pain 07/18/2017    Hemorrhoids 03/15/2017    Gastroesophageal reflux disease 03/14/2017    Major depressive disorder, recurrent episode (HCC) 08/31/2016    Arthropathy of lumbar facet joint 07/12/2016    Chronic low back pain 02/14/2016    Obstructive sleep apnea 09/09/2015    Plantar fasciitis, bilateral 08/19/2015    Anal pain 05/12/2015    Irritable bowel syndrome 11/17/2011    Allergic rhinitis 05/24/1996       Past Medical History[1]     Past Surgical History[2]    Family History   Problem Relation Age of Onset    Colorectal Cancer Paternal Uncle         after age 50       Social History     Socioeconomic History    Marital status:      Spouse name: Not on file    Number of  children: Not on file    Years of education: Not on file    Highest education level: Not on file   Occupational History    Not on file   Tobacco Use    Smoking status: Never    Smokeless tobacco: Never   Vaping Use    Vaping status: Never Used   Substance and Sexual Activity    Alcohol use: Yes     Comment: very rarely    Drug use: Never    Sexual activity: Yes     Partners: Female     Birth control/protection: None   Other Topics Concern    Not on file   Social History Narrative    Not on file     Social Drivers of Health     Financial Resource Strain: Patient Declined (4/1/2025)    Overall Financial Resource Strain (CARDIA)     Difficulty of Paying Living Expenses: Patient declined   Food Insecurity: Patient Declined (4/1/2025)    Hunger Vital Sign     Worried About Running Out of Food in the Last Year: Patient declined     Ran Out of Food in the Last Year: Patient declined   Transportation Needs: Patient Declined (4/1/2025)    PRAPARE - Transportation     Lack of Transportation (Medical): Patient declined     Lack of Transportation (Non-Medical): Patient declined   Physical Activity: Patient Declined (4/1/2025)    Exercise Vital Sign     Days of Exercise per Week: Patient declined     Minutes of Exercise per Session: Patient declined   Stress: Patient Declined (4/1/2025)    [employer] [place] [employer] - Occupational Stress Questionnaire     Feeling of Stress : Patient declined   Social Connections: Patient Declined (4/1/2025)    Social Connection and Isolation Panel [NHANES]     Frequency of Communication with Friends and Family: Patient declined     Frequency of Social Gatherings with Friends and Family: Patient declined     Attends Holiness Services: Patient declined     Active Member of Clubs or Organizations: Patient declined     Attends Club or Organization Meetings: Patient declined     Marital Status: Patient declined   Intimate Partner Violence: Not on file   Housing Stability: Patient  "Declined (4/1/2025)    Housing Stability Vital Sign     Unable to Pay for Housing in the Last Year: Patient declined     Number of Times Moved in the Last Year: Not on file     Homeless in the Last Year: Patient declined       Current Medications[3]     ALLERGIES: Seasonal    ROS  Constitutional: Denies fevers, Denies weight changes  Ears/Nose/Throat/Mouth: Denies nasal congestion or sore throat   Cardiovascular: Denies chest pain  Respiratory: Denies shortness of breath, Denies cough  Gastrointestinal/Hepatic: Denies nausea, vomiting  Sleep: see HPI      PHYSICAL EXAM  /72 (BP Location: Left arm, Patient Position: Sitting, BP Cuff Size: Adult)   Pulse (!) 118   Resp 16   Ht 1.88 m (6' 2\")   Wt 94.4 kg (208 lb 3.2 oz)   SpO2 95%   BMI 26.73 kg/m²   Appearance: Well-nourished, well-developed, no acute distress  Eyes:  No scleral icterus , EOMI  Musculoskeletal:  Grossly normal; gait and station normal; digits and nails normal  Skin:  No rashes, petechiae, cyanosis  Neurologic: without focal signs; oriented to person, time, place, and purpose; judgement intact      Medical Decision Making   Assessment and Plan  Abel Webster is a 44 y.o.male  with GERD, asthma, daytime sleepiness, and obstructive sleep apnea status post hypoglossal nerve stimulator.  Presents today to follow-up regarding management of obstructive sleep apnea.    The medical record was reviewed.    Obstructive sleep apnea  Device data shows irregular usage since December of last year.  Given his buzzing which is impacting his ability to use the device changed pulse within rate to see if this would help with the sensation.  Discussed also adjusting voltage level if needed to see if there is a voltage dependent buzzing that occurs.  Encouraged to restart usage nightly.    PLAN:   - Encouraged to restart hypoglossal nerve stimulator usage nightly  -Simple programming completed at today's visit  -Advised to reach out via [employer] with " questions     Patients with JAYESH are at increased risk of cardiovascular disease including coronary artery disease, systemic arterial hypertension, pulmonary arterial hypertension, cardiac arrythmias, and stroke.     Anxiety  Patient has dealt with anxiety for years.  He does feel that it impacts his ability to sleep at night as well as his functionality throughout the day.  He has not met with psychology regarding his anxiety levels.  He is open to meeting with psychology.    Plan  - Referral placed to psychology    Return in about 1 week (around 7/24/2025).      Please note portions of this record was created using voice recognition software. I have made every reasonable attempt to correct obvious errors, but I expect that there are errors of grammar and possibly content I did not discover before finalizing the note.           [1]   Past Medical History:  Diagnosis Date    Arthropathy     Asthma 02/13/2023    inhalers daily and prn    Chickenpox     Chronic fatigue     Chronic pain 02/13/2023    back, buttocks    Dental disorder     lower tooth    Disorder of thyroid 02/13/2023    medicated    Fatty liver     GERD (gastroesophageal reflux disease)     Heart burn 02/13/2023    medicated    Hypoplasia of mandible     IBS (irritable bowel syndrome) 02/13/2023    medicated    Maxillary hypoplasia     Sleep apnea 02/13/2023    positive JAYESH, but is unable to use CPAP   [2]   Past Surgical History:  Procedure Laterality Date    TURBINOPLASTY Bilateral 4/25/2025    Procedure: BILATERAL TURBINOPLASTY, BILATERAL FRONTAL SINUS EXPLORATION, BILATERAL ETHMOIDECTOMIES, BILATERAL MAXILLARY ANTROSTOMY, BILATERAL SPHENOIDOTOMY;  Surgeon: Esperanza Pierson M.D.;  Location: SURGERY [facility];  Service: Ent    ENDOSCOPY, PARANASAL SINUS, WITH TOTAL ETHMOIDECTOMY, SN Bilateral 4/25/2025    Procedure: ENDOSCOPY, PARANASAL SINUS;  Surgeon: Esperanza Pierson M.D.;  Location: SURGERY [facility];  Service: Ent    MD OPEN IMPLTJ  HPGLSL NRV NSTIM RA PG&RESPIR SENSOR Right 3/6/2023    Procedure: INSERTION OF HYPOGLOSSAL NERVE NEUROSTIMULATOR ELECTRODE AND GENERATOR AND BREATHING SENSOR ELECTRODE RIGHT;  Surgeon: Pedro Wen M.D.;  Location: SURGERY SAME DAY [facility];  Service: Ent    OTHER  02/13/2023 11/2021 septoplasty    OTHER  02/13/2023 2021 Hard palate expander placed    OTHER ABDOMINAL SURGERY     [3]   Current Outpatient Medications   Medication Sig Dispense Refill    Armodafinil 250 MG Tab Take 1 Tablet by mouth 1 time a day as needed (sleepiness) for up to 30 days. Indications: Obstructive Sleep Apnea Syndrome 30 Tablet 2    ondansetron (ZOFRAN ODT) 4 MG TABLET DISPERSIBLE Take 1 Tablet by mouth every 6 hours as needed for Nausea/Vomiting. 10 Tablet 0    ondansetron (ZOFRAN ODT) 8 MG TABLET DISPERSIBLE Take 8 mg by mouth every 8 hours as needed for Nausea.      mupirocin (BACTROBAN) 2 % Ointment 1 application to affected area Externally Three times a day 14 days 22 g 1    erythromycin with ethanol (EMGEL) 2 % gel Apply to armpit area twice daily  x 3 weeks 60 g 0    pantoprazole (PROTONIX) 40 MG Tablet Delayed Response Take 1 Tablet by mouth every day. 90 Tablet 3    fluticasone-umeclidinium-vilanterol (TRELEGY ELLIPTA) 200-62.5-25 mcg/PUFF inhaler Inhale 1 Puff by mouth every day. 180 Each 3    levothyroxine (SYNTHROID) 25 MCG Tab Take 1 Tablet by mouth every morning on an empty stomach. 90 Tablet 3    cholestyramine (QUESTRAN) 4 GM/DOSE powder MIX 1 SCOOP FULL in liquid and drink TWICE A DAY (PT PREFERS CANS NDC 2760-4909-52) 2268 g 2    albuterol 108 (90 Base) MCG/ACT Aero Soln inhalation aerosol Inhale 2 Puffs every four hours as needed for Shortness of Breath. 8.5 g 3     No current facility-administered medications for this visit.

## 2025-07-18 NOTE — PROGRESS NOTES
"Abel Webster is a 44 y.o. male who presents for Knee Injury (X3 days, fell on cement, impact on right knee, twisted left ankle)      HPI  This is a new problem. Abel Webster is a 44 y.o. patient who presents to urgent care with c/o: pain 3-4 when sitting. Going down stairs hurts more. He tripped and feel on uneven concrete 3 days ago. Both of his knees hurt. He also twisted his left ankle.   Tx tried: ice packs   Pt requesting xray.     ROS See HPI    Allergies:     Allergies[1]    PMSFS Hx:  Past Medical History[2]  Past Surgical History[3]  Family History   Problem Relation Age of Onset    Colorectal Cancer Paternal Uncle         after age 50     Social History     Tobacco Use    Smoking status: Never    Smokeless tobacco: Never   Substance Use Topics    Alcohol use: Yes     Comment: very rarely         Problems:   Problem List[4]    Medications:   Medications Ordered Prior to Encounter[5]     Objective:     /78 (BP Location: Left arm, Patient Position: Sitting, BP Cuff Size: Adult)   Pulse (!) 103   Temp 37.2 °C (98.9 °F) (Temporal)   Resp 16   Ht 1.88 m (6' 2\")   Wt 94.8 kg (209 lb)   SpO2 97%   BMI 26.83 kg/m²     Physical Exam  Vitals and nursing note reviewed.   Constitutional:       Appearance: Normal appearance. He is well-developed and well-groomed.   Cardiovascular:      Rate and Rhythm: Normal rate and regular rhythm.      Pulses: Normal pulses.   Pulmonary:      Effort: Pulmonary effort is normal.   Musculoskeletal:      Right knee: Swelling, bony tenderness and crepitus present. Decreased range of motion. Tenderness present over the lateral joint line and patellar tendon.      Left knee: No swelling, erythema or crepitus. Normal range of motion. Tenderness present.      Left ankle: No swelling. Tenderness present. Decreased range of motion.      Left Achilles Tendon: Normal.   Skin:     General: Skin is warm and dry.      Capillary Refill: Capillary refill takes less than 2 " seconds.   Neurological:      Mental Status: He is alert and oriented to person, place, and time.      Cranial Nerves: No cranial nerve deficit.      Coordination: Coordination normal.      Gait: Gait abnormal (slow and mildly antalgic).      Deep Tendon Reflexes: Reflexes are normal and symmetric.   Psychiatric:         Mood and Affect: Mood normal.         Speech: Speech normal.         Behavior: Behavior normal. Behavior is cooperative.         Thought Content: Thought content normal.     Xray was reviewed and interpreted independently by me.     RADIOLOGY RESULTS   DX-KNEE 3 VIEWS Pain/Deformity Following Trauma  Result Date: 7/17/2025 7/17/2025 5:26 PM HISTORY/REASON FOR EXAM:  Pain/Deformity Following Trauma. TECHNIQUE/EXAM DESCRIPTION AND NUMBER OF VIEWS:  4 views of the  Right knee. COMPARISON: None available FINDINGS: MINERALIZATION: Mineralization is unremarkable for age. INJURY: No acute fracture or gross malalignment is seen. MEDIAL JOINT COMPARTMENT: Unremarkable LATERAL JOINT COMPARTMENT: Unremarkable PATELLOFEMORAL JOINT COMPARTMENT: Unremarkable No joint effusion evident.     No radiographic evidence of acute traumatic injury.             Assessment /Associated Orders:      1. Injury of right knee, initial encounter  DX-KNEE 3 VIEWS Pain/Deformity Following Trauma      2. Sprain of left ankle, unspecified ligament, initial encounter        3. Fall on same level from slipping, tripping or stumbling, initial encounter  DX-KNEE 3 VIEWS Pain/Deformity Following Trauma      4. Contusion of left knee, initial encounter        5. Contusion of right knee, initial encounter              Medical Decision Making:    Abel Webster is a very pleasant 44 y.o. male who is clinically stable at today's acute urgent care visit. Presents with acute problem/ concern today.    No acute distress is noted at the time of the visit.  VSS. Appropriate for outpatient care at this time.     Results of tests discussed  today (including any incidental findings if present).      Hinged knee brace prn pain   Ankle brace prn pain   Ice pack prn pain   OTC  analgesic of choice (acetaminophen or NSAID) prn pain. Follow manufactures dosing and safety precautions.     Through shared decision making a discussion of the Dx and DDx, management options (risks,benefits, and alternatives to planned treatment), natural progression, supportive care and indications for immediate follow-up discussed. Expressed understanding and the treatment plan was agreed upon.    Questions were encouraged and answered     Follow Up:   Return to urgent care prn if new or worsening sx or if there is no improvement in condition prn.    Educated in Red flags and indications to immediately call 911 or present to the Emergency Department.       Time I spent evaluating Abel Webster in urgent care today was 30  minutes. This time includes preparing for visit, reviewing any pertinent notes or test results, exam, obtaining HPI, interpretation of lab tests,counseling/education, medication management ( RX and/ or OTC)  and documentation as indicated above.Time does not include separately billable procedures if noted .       Please note that this dictation was created using voice recognition software. I have worked with consultants from the vendor as well as technical experts from [facility] to optimize the interface. I have made every reasonable attempt to correct obvious errors, but I expect that there are errors of grammar and possibly content that I did not discover before finalizing the note.  This note was electronically signed by provider           [1]   Allergies  Allergen Reactions    Seasonal Runny Nose     Pollens, dust   [2]   Past Medical History:  Diagnosis Date    Arthropathy     Asthma 02/13/2023    inhalers daily and prn    Chickenpox     Chronic fatigue     Chronic pain 02/13/2023    back, buttocks    Dental disorder     lower tooth    Disorder of  thyroid 02/13/2023    medicated    Fatty liver     GERD (gastroesophageal reflux disease)     Heart burn 02/13/2023    medicated    Hypoplasia of mandible     IBS (irritable bowel syndrome) 02/13/2023    medicated    Maxillary hypoplasia     Sleep apnea 02/13/2023    positive JAYESH, but is unable to use CPAP   [3]   Past Surgical History:  Procedure Laterality Date    TURBINOPLASTY Bilateral 4/25/2025    Procedure: BILATERAL TURBINOPLASTY, BILATERAL FRONTAL SINUS EXPLORATION, BILATERAL ETHMOIDECTOMIES, BILATERAL MAXILLARY ANTROSTOMY, BILATERAL SPHENOIDOTOMY;  Surgeon: Esperanza Pierson M.D.;  Location: SURGERY [facility];  Service: Ent    ENDOSCOPY, PARANASAL SINUS, WITH TOTAL ETHMOIDECTOMY, SN Bilateral 4/25/2025    Procedure: ENDOSCOPY, PARANASAL SINUS;  Surgeon: Esperanza Pierson M.D.;  Location: SURGERY [facility];  Service: Ent    NE OPEN IMPLTJ HPGLSL NRV NSTIM RA PG&RESPIR SENSOR Right 3/6/2023    Procedure: INSERTION OF HYPOGLOSSAL NERVE NEUROSTIMULATOR ELECTRODE AND GENERATOR AND BREATHING SENSOR ELECTRODE RIGHT;  Surgeon: Pedro Wen M.D.;  Location: SURGERY SAME DAY [facility];  Service: Ent    OTHER  02/13/2023 11/2021 septoplasty    OTHER  02/13/2023 2021 Hard palate expander placed    OTHER ABDOMINAL SURGERY     [4]   Patient Active Problem List  Diagnosis    Anal pain    Arthropathy of lumbar facet joint    Chronic fatigue    Chronic low back pain    Fatty liver    Gastroesophageal reflux disease    Hemorrhoids    Hypertrophy, nasal, turbinate    Irritable bowel syndrome    Jaw pain    Major depressive disorder, recurrent episode (HCC)    Mandibular hypoplasia    Migraines    Plantar fasciitis, bilateral    Asthma    Obstructive sleep apnea    Maxillary hypoplasia    Allergic rhinitis    Skin lesion of face    Low testosterone    Anal fissure    Moderate persistent asthma without complication    Hypothyroid    Loose stools    Hypersomnolence   [5]   Current Outpatient Medications on File  Prior to Visit   Medication Sig Dispense Refill    Armodafinil 250 MG Tab Take 1 Tablet by mouth 1 time a day as needed (sleepiness) for up to 30 days. Indications: Obstructive Sleep Apnea Syndrome 30 Tablet 2    ondansetron (ZOFRAN ODT) 4 MG TABLET DISPERSIBLE Take 1 Tablet by mouth every 6 hours as needed for Nausea/Vomiting. 10 Tablet 0    ondansetron (ZOFRAN ODT) 8 MG TABLET DISPERSIBLE Take 8 mg by mouth every 8 hours as needed for Nausea.      mupirocin (BACTROBAN) 2 % Ointment 1 application to affected area Externally Three times a day 14 days 22 g 1    erythromycin with ethanol (EMGEL) 2 % gel Apply to armpit area twice daily  x 3 weeks 60 g 0    pantoprazole (PROTONIX) 40 MG Tablet Delayed Response Take 1 Tablet by mouth every day. 90 Tablet 3    fluticasone-umeclidinium-vilanterol (TRELEGY ELLIPTA) 200-62.5-25 mcg/PUFF inhaler Inhale 1 Puff by mouth every day. 180 Each 3    levothyroxine (SYNTHROID) 25 MCG Tab Take 1 Tablet by mouth every morning on an empty stomach. 90 Tablet 3    cholestyramine (QUESTRAN) 4 GM/DOSE powder MIX 1 SCOOP FULL in liquid and drink TWICE A DAY (PT PREFERS CANS NDC 0094-6163-52) 2268 g 2    albuterol 108 (90 Base) MCG/ACT Aero Soln inhalation aerosol Inhale 2 Puffs every four hours as needed for Shortness of Breath. 8.5 g 3    HYDROcodone/acetaminophen (NORCO)  MG Tab Take 1 tablet by mouth every 6 hours as needed for 5 days (Patient not taking: Reported on 7/7/2025) 15 Tablet 0    oxyCODONE-acetaminophen (PERCOCET)  MG Tab Take 1 tablet by mouth every 6 hours as needed for 7 days (Patient not taking: Reported on 7/7/2025) 21 Tablet 0    Non Formulary Request B complex (Patient not taking: Reported on 7/7/2025)      VITAMIN D PO Take  by mouth every day. (Patient not taking: Reported on 7/17/2025)       No current facility-administered medications on file prior to visit.

## 2025-07-22 ENCOUNTER — OFFICE VISIT (OUTPATIENT)
Dept: SLEEP MEDICINE | Facility: MEDICAL CENTER | Age: 45
End: 2025-07-22
Attending: STUDENT IN AN ORGANIZED HEALTH CARE EDUCATION/TRAINING PROGRAM
Payer: COMMERCIAL

## 2025-07-22 VITALS
BODY MASS INDEX: 26.44 KG/M2 | HEART RATE: 89 BPM | OXYGEN SATURATION: 97 % | WEIGHT: 206 LBS | HEIGHT: 74 IN | RESPIRATION RATE: 14 BRPM

## 2025-07-22 DIAGNOSIS — Z96.82 S/P INSERTION OF HYPOGLOSSAL NERVE STIMULATOR: ICD-10-CM

## 2025-07-22 DIAGNOSIS — G47.33 OSA (OBSTRUCTIVE SLEEP APNEA): Primary | ICD-10-CM

## 2025-07-22 DIAGNOSIS — F41.9 ANXIETY: ICD-10-CM

## 2025-07-22 PROCEDURE — 99214 OFFICE O/P EST MOD 30 MIN: CPT | Performed by: STUDENT IN AN ORGANIZED HEALTH CARE EDUCATION/TRAINING PROGRAM

## 2025-07-22 PROCEDURE — 99213 OFFICE O/P EST LOW 20 MIN: CPT | Performed by: STUDENT IN AN ORGANIZED HEALTH CARE EDUCATION/TRAINING PROGRAM

## 2025-07-22 PROCEDURE — 95970 ALYS NPGT W/O PRGRMG: CPT | Performed by: STUDENT IN AN ORGANIZED HEALTH CARE EDUCATION/TRAINING PROGRAM

## 2025-07-22 ASSESSMENT — FIBROSIS 4 INDEX: FIB4 SCORE: 0.71

## 2025-07-22 NOTE — PROGRESS NOTES
[facility] Follow-up Visit    CC: Follow-up regarding management of obstructive sleep apnea      HPI:  Abel Webster is a 44 y.o.male  with GERD, asthma, daytime sleepiness, and obstructive sleep apnea status post hypoglossal nerve stimulator.  Presents today to follow-up regarding management obstructive sleep apnea.    Since his last visit the settings that were adjusted have taken away the buzzing.  He still finds that he is sleeping lightly at night which the inspire device can wake him up.  He does report 1 instance that his current level of 0.8 V where he felt as though he was having difficulty breathing at night and woke up gasping.  He does not recall if he was on his back at that time.    At last visit it was discussed concerns regarding anxiety.  Referral was placed    Interrogation of hypoglossal nerve stimulator  He still has difficulty connecting his device to his althea on his phone.  Usage data from his device shows 13 hours of usage since last visit.  Stimulation was delivered to incoming setting which elicited slight tongue protrusion.    Current settings  1)Start Delay: 75 min  2)Pause Time: 30 min  3)Total run time: 11 hours  4) Minimum voltage 0.8 v   5) Maximum voltage 1.3v  6) Pulse configuration (E:- + -)  7) Pulse Width 120 microseconds  8) Rate 30 Hz  9) Current voltage 0.8v    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    Hypertrophy, nasal, turbinate 07/03/2019    Fatty liver 11/07/2018    Migraines 11/07/2018    Asthma 11/07/2018    Jaw pain 07/18/2017    Hemorrhoids 03/15/2017    Gastroesophageal reflux disease 03/14/2017    Major  depressive disorder, recurrent episode (HCC) 08/31/2016    Arthropathy of lumbar facet joint 07/12/2016    Chronic low back pain 02/14/2016    Obstructive sleep apnea 09/09/2015    Plantar fasciitis, bilateral 08/19/2015    Anal pain 05/12/2015    Irritable bowel syndrome 11/17/2011    Allergic rhinitis 05/24/1996       Past Medical History[1]     Past Surgical History[2]    Family History   Problem Relation Age of Onset    Colorectal Cancer Paternal Uncle         after age 50       Social History     Socioeconomic History    Marital status:      Spouse name: Not on file    Number of children: Not on file    Years of education: Not on file    Highest education level: Not on file   Occupational History    Not on file   Tobacco Use    Smoking status: Never    Smokeless tobacco: Never   Vaping Use    Vaping status: Never Used   Substance and Sexual Activity    Alcohol use: Yes     Comment: very rarely    Drug use: Never    Sexual activity: Yes     Partners: Female     Birth control/protection: None   Other Topics Concern    Not on file   Social History Narrative    Not on file     Social Drivers of Health     Financial Resource Strain: Patient Declined (4/1/2025)    Overall Financial Resource Strain (CARDIA)     Difficulty of Paying Living Expenses: Patient declined   Food Insecurity: Patient Declined (4/1/2025)    Hunger Vital Sign     Worried About Running Out of Food in the Last Year: Patient declined     Ran Out of Food in the Last Year: Patient declined   Transportation Needs: Patient Declined (4/1/2025)    PRAPARE - Transportation     Lack of Transportation (Medical): Patient declined     Lack of Transportation (Non-Medical): Patient declined   Physical Activity: Patient Declined (4/1/2025)    Exercise Vital Sign     Days of Exercise per Week: Patient declined     Minutes of Exercise per Session: Patient declined   Stress: Patient Declined (4/1/2025)    [employer] -  "Occupational Stress Questionnaire     Feeling of Stress : Patient declined   Social Connections: Patient Declined (4/1/2025)    Social Connection and Isolation Panel [NHANES]     Frequency of Communication with Friends and Family: Patient declined     Frequency of Social Gatherings with Friends and Family: Patient declined     Attends [employer]ze Services: Patient declined     Active Member of Clubs or Organizations: Patient declined     Attends Club or Organization Meetings: Patient declined     Marital Status: Patient declined   Intimate Partner Violence: Not on file   Housing Stability: Patient Declined (4/1/2025)    Housing Stability Vital Sign     Unable to Pay for Housing in the Last Year: Patient declined     Number of Times Moved in the Last Year: Not on file     Homeless in the Last Year: Patient declined       Current Medications[3]     ALLERGIES: Seasonal    ROS  Constitutional: Denies fevers, Denies weight changes  Ears/Nose/Throat/Mouth: Denies nasal congestion or sore throat   Cardiovascular: Denies chest pain  Respiratory: Denies shortness of breath, Denies cough  Gastrointestinal/Hepatic: Denies nausea, vomiting  Sleep: see HPI      PHYSICAL EXAM  Pulse 89   Resp 14   Ht 1.88 m (6' 2\")   Wt 93.4 kg (206 lb)   SpO2 97%   BMI 26.45 kg/m²   Appearance: Well-nourished, well-developed, no acute distress  Eyes:  No scleral icterus , EOMI  Musculoskeletal:  Grossly normal; gait and station normal; digits and nails normal  Skin:  No rashes, petechiae, cyanosis  Neurologic: without focal signs; oriented to person, time, place, and purpose; judgement intact      Medical Decision Making   Assessment and Plan  Abel Webster is a 44 y.o.male  with GERD, asthma, daytime sleepiness, and obstructive sleep apnea status post hypoglossal nerve stimulator.  Presents today to follow-up regarding management obstructive sleep apnea.    The medical record was reviewed.    Obstructive sleep apnea  His buzzing has " improved with changes made at last visit.  He continues have difficulty using his device for the full night as he feels it kicks him out of sleep at times.  He has not used his device consistently in the past 7 months.    Discussed potential decrease in tolerance.  Advised that he try to use his device nightly.    Reviewed past medications patient is used for sleep.  Patient has tried trazodone, gabapentin, Lunesta, lemborexant, alprazolam, hydroxyzine.  He has been sensitive to medications in the past.  He has not found a medication that has helped keep him asleep.    PLAN:   - Continued use of hypoglossal nerve stimulator  -Advised to reach out via MyChart with questions     Patients with JAYESH are at increased risk of cardiovascular disease including coronary artery disease, systemic arterial hypertension, pulmonary arterial hypertension, cardiac arrythmias, and stroke. The patient was advised to avoid driving a motor vehicle when drowsy.    Anxiety  Referral was placed to behavioral medicine.  Patient would likely benefit from meeting with a psychologist to discuss his anxiety.    Return in about 4 weeks (around 8/19/2025).    Total time care for the patient this date was 40 minutes. Time spent includes chart review, lab review, discussion with myself. This time was spent separate from device analysis /programming on this date.      Please note portions of this record was created using voice recognition software. I have made every reasonable attempt to correct obvious errors, but I expect that there are errors of grammar and possibly content I did not discover before finalizing the note.           [1]   Past Medical History:  Diagnosis Date    Arthropathy     Asthma 02/13/2023    inhalers daily and prn    Chickenpox     Chronic fatigue     Chronic pain 02/13/2023    back, buttocks    Dental disorder     lower tooth    Disorder of thyroid 02/13/2023    medicated    Fatty liver     GERD (gastroesophageal reflux  disease)     Heart burn 02/13/2023    medicated    Hypoplasia of mandible     IBS (irritable bowel syndrome) 02/13/2023    medicated    Maxillary hypoplasia     Sleep apnea 02/13/2023    positive JAYESH, but is unable to use CPAP   [2]   Past Surgical History:  Procedure Laterality Date    TURBINOPLASTY Bilateral 4/25/2025    Procedure: BILATERAL TURBINOPLASTY, BILATERAL FRONTAL SINUS EXPLORATION, BILATERAL ETHMOIDECTOMIES, BILATERAL MAXILLARY ANTROSTOMY, BILATERAL SPHENOIDOTOMY;  Surgeon: Esperanza Pierson M.D.;  Location: SURGERY [facility];  Service: Ent    ENDOSCOPY, PARANASAL SINUS, WITH TOTAL ETHMOIDECTOMY, SN Bilateral 4/25/2025    Procedure: ENDOSCOPY, PARANASAL SINUS;  Surgeon: Esperanza Pierson M.D.;  Location: SURGERY [facility];  Service: Ent    FL OPEN IMPLTJ HPGLSL NRV NSTIM RA PG&RESPIR SENSOR Right 3/6/2023    Procedure: INSERTION OF HYPOGLOSSAL NERVE NEUROSTIMULATOR ELECTRODE AND GENERATOR AND BREATHING SENSOR ELECTRODE RIGHT;  Surgeon: Pedro Wen M.D.;  Location: SURGERY SAME DAY [facility];  Service: Ent    OTHER  02/13/2023 11/2021 septoplasty    OTHER  02/13/2023 2021 Hard palate expander placed    OTHER ABDOMINAL SURGERY     [3]   Current Outpatient Medications   Medication Sig Dispense Refill    Armodafinil 250 MG Tab Take 1 Tablet by mouth 1 time a day as needed (sleepiness) for up to 30 days. Indications: Obstructive Sleep Apnea Syndrome 30 Tablet 2    ondansetron (ZOFRAN ODT) 4 MG TABLET DISPERSIBLE Take 1 Tablet by mouth every 6 hours as needed for Nausea/Vomiting. 10 Tablet 0    ondansetron (ZOFRAN ODT) 8 MG TABLET DISPERSIBLE Take 8 mg by mouth every 8 hours as needed for Nausea.      mupirocin (BACTROBAN) 2 % Ointment 1 application to affected area Externally Three times a day 14 days 22 g 1    erythromycin with ethanol (EMGEL) 2 % gel Apply to armpit area twice daily  x 3 weeks 60 g 0    pantoprazole (PROTONIX) 40 MG Tablet Delayed Response Take 1 Tablet by mouth every  day. 90 Tablet 3    fluticasone-umeclidinium-vilanterol (TRELEGY ELLIPTA) 200-62.5-25 mcg/PUFF inhaler Inhale 1 Puff by mouth every day. 180 Each 3    levothyroxine (SYNTHROID) 25 MCG Tab Take 1 Tablet by mouth every morning on an empty stomach. 90 Tablet 3    cholestyramine (QUESTRAN) 4 GM/DOSE powder MIX 1 SCOOP FULL in liquid and drink TWICE A DAY (PT PREFERS CANS NDC 4125-4753-13) 2268 g 2    albuterol 108 (90 Base) MCG/ACT Aero Soln inhalation aerosol Inhale 2 Puffs every four hours as needed for Shortness of Breath. 8.5 g 3     No current facility-administered medications for this visit.

## 2025-07-29 NOTE — Clinical Note
REFERRAL APPROVAL NOTICE         Sent on July 29, 2025                   Abel GAR Eleanor  [address]  [address]  [place] [address]                   Dear Mr. Webster,    After a careful review of the medical information and benefit coverage, [facility] has processed your referral. See below for additional details.    If applicable, you must be actively enrolled with your insurance for coverage of the authorized service. If you have any questions regarding your coverage, please contact your insurance directly.    REFERRAL INFORMATION   Referral #:  [id number]  Referred-To Provider    Referred-By Provider:  Behavioral Health    Jamie Osborn M.D.   [facility]      [id number]  [place] [address]  [phone] [address] [facility]  [address]  [place] [address]  [phone]    Referral Start Date:  07/22/2025  Referral End Date:   07/22/2026             SCHEDULING  If you do not already have an appointment, please call [phone] to make an appointment.     MORE INFORMATION  If you do not already have a [facility] account, sign up at: [email or web address]  You can access your medical information, make appointments, see lab results, billing information, and more.  If you have questions regarding this referral, please contact  the [facility] Referrals department at:             [phone]. Monday - Friday 8:00AM - 5:00PM.     Sincerely,    [facility]

## 2025-08-28 ENCOUNTER — OFFICE VISIT (OUTPATIENT)
Dept: SLEEP MEDICINE | Facility: MEDICAL CENTER | Age: 45
End: 2025-08-28
Attending: STUDENT IN AN ORGANIZED HEALTH CARE EDUCATION/TRAINING PROGRAM
Payer: COMMERCIAL

## 2025-08-28 VITALS
RESPIRATION RATE: 16 BRPM | BODY MASS INDEX: 26.44 KG/M2 | OXYGEN SATURATION: 100 % | WEIGHT: 206 LBS | DIASTOLIC BLOOD PRESSURE: 80 MMHG | SYSTOLIC BLOOD PRESSURE: 120 MMHG | HEART RATE: 116 BPM | HEIGHT: 74 IN

## 2025-08-28 DIAGNOSIS — G47.33 OSA (OBSTRUCTIVE SLEEP APNEA): ICD-10-CM

## 2025-08-28 DIAGNOSIS — F51.04 CHRONIC INSOMNIA: Primary | ICD-10-CM

## 2025-08-28 DIAGNOSIS — Z96.82 S/P INSERTION OF HYPOGLOSSAL NERVE STIMULATOR: ICD-10-CM

## 2025-08-28 DIAGNOSIS — G47.19 EXCESSIVE DAYTIME SLEEPINESS: ICD-10-CM

## 2025-08-28 PROCEDURE — 3079F DIAST BP 80-89 MM HG: CPT | Performed by: STUDENT IN AN ORGANIZED HEALTH CARE EDUCATION/TRAINING PROGRAM

## 2025-08-28 PROCEDURE — 95970 ALYS NPGT W/O PRGRMG: CPT | Performed by: STUDENT IN AN ORGANIZED HEALTH CARE EDUCATION/TRAINING PROGRAM

## 2025-08-28 PROCEDURE — 3074F SYST BP LT 130 MM HG: CPT | Performed by: STUDENT IN AN ORGANIZED HEALTH CARE EDUCATION/TRAINING PROGRAM

## 2025-08-28 PROCEDURE — 99214 OFFICE O/P EST MOD 30 MIN: CPT | Performed by: STUDENT IN AN ORGANIZED HEALTH CARE EDUCATION/TRAINING PROGRAM

## 2025-08-28 RX ORDER — ZALEPLON 5 MG/1
5 CAPSULE ORAL NIGHTLY
Qty: 30 CAPSULE | Refills: 1 | Status: SHIPPED | OUTPATIENT
Start: 2025-08-28 | End: 2025-10-27

## 2025-08-28 ASSESSMENT — FIBROSIS 4 INDEX: FIB4 SCORE: 0.73

## 2025-11-03 ENCOUNTER — APPOINTMENT (OUTPATIENT)
Dept: SLEEP MEDICINE | Facility: MEDICAL CENTER | Age: 45
End: 2025-11-03
Attending: STUDENT IN AN ORGANIZED HEALTH CARE EDUCATION/TRAINING PROGRAM
Payer: COMMERCIAL

## (undated) DEVICE — LACTATED RINGERS INJ 1000 ML - (14EA/CA 60CA/PF)

## (undated) DEVICE — CANISTER SUCTION 3000ML MECHANICAL FILTER AUTO SHUTOFF MEDI-VAC NONSTERILE LF DISP  (40EA/CA)

## (undated) DEVICE — SUTURE 2-0 SILK SH (36PK/BX)

## (undated) DEVICE — SUCTION INSTRUMENT YANKAUER BULBOUS TIP W/O VENT (50EA/CA)

## (undated) DEVICE — SUTURE GENERAL

## (undated) DEVICE — DRESSING TRANSPARENT FILM TEGADERM 2.375 X 2.75"  (100EA/BX)"

## (undated) DEVICE — SET LEADWIRE 5 LEAD BEDSIDE DISPOSABLE ECG (1SET OF 5/EA)

## (undated) DEVICE — SUTURE 4-0 MONOCRYL PLUS PS-2 - 27 INCH (36/BX)

## (undated) DEVICE — SLEEVE VASO CALF MED - (10PR/CA)

## (undated) DEVICE — SUTURE 3-0 VICRYL PLUS SH - 8X 18 INCH (12/BX)

## (undated) DEVICE — GLOVE BIOGEL PI INDICATOR SZ 6.5 SURGICAL PF LF - (50/BX 4BX/CA)

## (undated) DEVICE — DEPRESSOR TONGUE ADLT STERILE - 6 IN (100EA/BX)

## (undated) DEVICE — CANNULA W/ SUPPLY TUBING O2 - (50/CA)

## (undated) DEVICE — SYRINGE 10 ML CONTROL LL (25EA/BX 4BX/CA)

## (undated) DEVICE — SODIUM CHL IRRIGATION 0.9% 1000ML (12EA/CA)

## (undated) DEVICE — BLADE SURGICAL #15 - (50/BX 3BX/CA)

## (undated) DEVICE — TRAY SRGPRP PVP IOD WT PRP - (20/CA)

## (undated) DEVICE — ANTI-FOG SOLUTION - 60BTL/CA

## (undated) DEVICE — PROBE KARTUSH BIPOL STIMULATI (5EA/PK)

## (undated) DEVICE — GOWN WARMING STANDARD FLEX - (30/CA)

## (undated) DEVICE — SPONGE GAUZESTER. 2X2 4-PL - (2/PK 50PK/BX 30BX/CS)

## (undated) DEVICE — PACK ENT OR - (2EA/CA)

## (undated) DEVICE — BOVIE BLADE COATED &INSULATED - 25/PK

## (undated) DEVICE — PEN SKIN MARKER W/RULER - (50EA/BX)

## (undated) DEVICE — ELECTRODE DUAL RETURN W/ CORD - (50/PK)

## (undated) DEVICE — CANISTER SUCTION RIGID RED 1500CC (40EA/CA)

## (undated) DEVICE — SUTURE 3-0 SILK RB-1 C/R (12/BX)

## (undated) DEVICE — KIT  I.V. START (100EA/CA)

## (undated) DEVICE — Device

## (undated) DEVICE — CORDS BIPOLAR COAGULATION - 12FT STERILE DISP. (10EA/BX)

## (undated) DEVICE — ELECTRODE EMG PAIRED MEDTRONIC XOMED PRASS 18MM (5EA/PK)

## (undated) DEVICE — GLOVE CHEMO/AUTOPSY MEDIUM - POWDER FREE (50/BX)

## (undated) DEVICE — PATTIES SURG X-RAYCOTTONOID - 1/2 X 3 IN (200/CA)

## (undated) DEVICE — TUBE ET NASAL 7.0 UNCUFFED SHARIDAN PREFORMED (10/CA)

## (undated) DEVICE — CATHETER IV SAFETY 14 GA X 2 IN (200/CA)

## (undated) DEVICE — TUBE CONNECTING SUCTION - CLEAR PLASTIC STERILE 72 IN (50EA/CA)

## (undated) DEVICE — SUTURE 3-0 PROLENE SH 30 (36PK/BX)"

## (undated) DEVICE — GLOVE BIOGEL SZ 7.5 SURGICAL PF LTX - (50PR/BX 4BX/CA)

## (undated) DEVICE — WATER IRRIGATION STERILE 1000ML (12EA/CA)

## (undated) DEVICE — SPONGE PEANUT - (5/PK 50PK/CA)

## (undated) DEVICE — COVER LIGHT HANDLE FLEXIBLE - SOFT (2EA/PK 80PK/CA)

## (undated) DEVICE — GLOVE BIOGEL PI ULTRATOUCH SZ 7.5 SURGICAL PF LF -(50/BX 4BX/CA)

## (undated) DEVICE — MASK OXYGEN VNYL ADLT MED CONC WITH 7 FOOT TUBING  - (50EA/CA)

## (undated) DEVICE — TUBING CLEARLINK DUO-VENT - C-FLO (48EA/CA)

## (undated) DEVICE — DRAPE MICROSCOPE X-LONG (10EA/CA)

## (undated) DEVICE — SPLINT NASAL DOYLE AIRWAY (2PC/EA)

## (undated) DEVICE — SUTURE 2-0 SILK SH C/R ETHICON (12PK/BX)

## (undated) DEVICE — DRAPE SURGICAL U 77X120 - (10/CA)

## (undated) DEVICE — COVER CIV-FLEX TRANSDUCER - (24/BX)

## (undated) DEVICE — GLOVE BIOGEL PI INDICATOR SZ 7.5 SURGICAL PF LF -(50/BX 4BX/CA)

## (undated) DEVICE — NEEDLE NON SAFETY 27GA X 1-1/4 IN HYPO (100EA/BX)

## (undated) DEVICE — DRAPE IOBAN II INCISE 23X17 - (10EA/BX 4BX/CA)

## (undated) DEVICE — REMOTE PATIENT SLEEP INSPIRE (1EA)

## (undated) DEVICE — TOWEL STOP TIMEOUT SAFETY FLAG (40EA/CA)

## (undated) DEVICE — SENSOR OXIMETER ADULT SPO2 RD SET (20EA/BX)

## (undated) DEVICE — GLOVE SZ 7 BIOGEL PI MICRO - PF LF (50PR/BX 4BX/CA)

## (undated) DEVICE — GLOVE BIOGEL ECLIPSE PF LATEX SIZE 8.0 (50PR/BX)

## (undated) DEVICE — PACK MINOR BASIN - (4EA/CA)

## (undated) DEVICE — CONTAINER SPECIMEN BAG OR - STERILE 4 OZ W/LID (100EA/CA)

## (undated) DEVICE — GLOVE BIOGEL PI INDICATOR SZ 7.0 SURGICAL PF LF - (50/BX 4BX/CA)

## (undated) DEVICE — PASSER CATHETER 38 CM